# Patient Record
Sex: FEMALE | Race: WHITE | Employment: OTHER | URBAN - METROPOLITAN AREA
[De-identification: names, ages, dates, MRNs, and addresses within clinical notes are randomized per-mention and may not be internally consistent; named-entity substitution may affect disease eponyms.]

---

## 2018-08-06 ENCOUNTER — HOSPITAL ENCOUNTER (OUTPATIENT)
Age: 63
Discharge: HOME | End: 2018-08-08
Payer: COMMERCIAL

## 2018-08-06 ENCOUNTER — HOSPITAL ENCOUNTER (OUTPATIENT)
Dept: INTERVENTIONAL RADIOLOGY/VASCULAR | Age: 63
Discharge: HOME | End: 2018-08-08
Payer: COMMERCIAL

## 2018-08-06 ENCOUNTER — HOSPITAL ENCOUNTER (OUTPATIENT)
Dept: GENERAL RADIOLOGY | Age: 63
Discharge: HOME | End: 2018-08-08
Payer: COMMERCIAL

## 2018-08-06 DIAGNOSIS — R79.89 D-DIMER, ELEVATED: ICD-10-CM

## 2018-08-06 DIAGNOSIS — R06.02 SOB (SHORTNESS OF BREATH): ICD-10-CM

## 2018-08-06 PROCEDURE — 93970 EXTREMITY STUDY: CPT

## 2018-08-06 PROCEDURE — 71046 X-RAY EXAM CHEST 2 VIEWS: CPT

## 2018-09-07 ENCOUNTER — HOSPITAL ENCOUNTER (OUTPATIENT)
Dept: GENERAL RADIOLOGY | Age: 63
Discharge: HOME | End: 2018-09-09
Payer: COMMERCIAL

## 2018-09-07 ENCOUNTER — HOSPITAL ENCOUNTER (OUTPATIENT)
Age: 63
Discharge: HOME | End: 2018-09-09
Payer: COMMERCIAL

## 2018-09-07 DIAGNOSIS — R52 PAIN: ICD-10-CM

## 2018-09-07 PROCEDURE — 73630 X-RAY EXAM OF FOOT: CPT

## 2019-08-01 ENCOUNTER — OFFICE VISIT (OUTPATIENT)
Dept: PHYSICAL MEDICINE AND REHAB | Age: 64
End: 2019-08-01
Payer: COMMERCIAL

## 2019-08-01 VITALS
DIASTOLIC BLOOD PRESSURE: 64 MMHG | BODY MASS INDEX: 25.83 KG/M2 | SYSTOLIC BLOOD PRESSURE: 118 MMHG | HEART RATE: 75 BPM | HEIGHT: 65 IN | WEIGHT: 155 LBS

## 2019-08-01 DIAGNOSIS — R53.1 WEAKNESS: ICD-10-CM

## 2019-08-01 DIAGNOSIS — G56.03 BILATERAL CARPAL TUNNEL SYNDROME: Primary | ICD-10-CM

## 2019-08-01 DIAGNOSIS — R20.2 PARESTHESIA: ICD-10-CM

## 2019-08-01 PROCEDURE — 1036F TOBACCO NON-USER: CPT | Performed by: PHYSICAL MEDICINE & REHABILITATION

## 2019-08-01 PROCEDURE — G8427 DOCREV CUR MEDS BY ELIG CLIN: HCPCS | Performed by: PHYSICAL MEDICINE & REHABILITATION

## 2019-08-01 PROCEDURE — 3017F COLORECTAL CA SCREEN DOC REV: CPT | Performed by: PHYSICAL MEDICINE & REHABILITATION

## 2019-08-01 PROCEDURE — 99203 OFFICE O/P NEW LOW 30 MIN: CPT | Performed by: PHYSICAL MEDICINE & REHABILITATION

## 2019-08-01 PROCEDURE — G8419 CALC BMI OUT NRM PARAM NOF/U: HCPCS | Performed by: PHYSICAL MEDICINE & REHABILITATION

## 2019-08-01 RX ORDER — FLUTICASONE PROPIONATE 50 MCG
SPRAY, SUSPENSION (ML) NASAL PRN
COMMUNITY
Start: 2019-07-10 | End: 2021-11-29 | Stop reason: SDUPTHER

## 2019-08-01 RX ORDER — LOSARTAN POTASSIUM 25 MG/1
25 TABLET ORAL DAILY
COMMUNITY
Start: 2019-06-19 | End: 2021-08-03 | Stop reason: SDUPTHER

## 2019-08-01 NOTE — PROGRESS NOTES
Jovany Borjas D.O., P.T. [facility]  [address] [facility], [address] [place] [address]  Phone: [phone]  Fax: [phone]    PCP: Syl Cardoso MD  Date of visit: 8/1/19    Chief Complaint   Patient presents with    Hand Pain     New Patient, bilateral hand pain and weakness    Numbness       Dear Dr. Mabel Gunn,    As you know,  Amber Pride is a 61 y.o.  right hand dominant woman with sudden onset of right thirddigit numbness and weakness of the left  after using wire cutters 5 weeks ago. Now, the pain is constant and occurs daily. The pain is rated Pain Score:   0 - No pain, is described as numb, and is located in the right third digit tip without radiation. The symptoms have been unchanged since onset. The pain is better with nothing. The pain is worse with gripping, spray deoderant. The prior workup has included: none. The prior treatment has included:      Past Medical History:   Diagnosis Date    Diabetes mellitus ([address]     type 2    Hyperlipidemia        Past Surgical History:   Procedure Laterality Date    APPENDECTOMY  07/07/2016    laparoscopic    BREAST SURGERY      COLONOSCOPY      HYSTERECTOMY       Social History     Tobacco Use    Smoking status: Never Smoker    Smokeless tobacco: Never Used   Substance Use Topics    Alcohol use: No    Drug use: Not on file     Unemployed.      Family History   Problem Relation Age of Onset    Asthma Mother     High Blood Pressure Mother     Other Mother     Depression Mother     Heart Disease Father     High Cholesterol Father     High Blood Pressure Father     High Cholesterol Brother        Allergies   Allergen Reactions    Penicillins Rash       Current Outpatient Medications   Medication Sig Dispense Refill    metFORMIN (GLUCOPHAGE) 1000 MG tablet Take 1,000 mg by mouth 2 times daily      losartan (COZAAR) 25 MG tablet Take 25 mg by mouth daily     Beth Carmona

## 2019-08-15 ENCOUNTER — OFFICE VISIT (OUTPATIENT)
Dept: PHYSICAL MEDICINE AND REHAB | Age: 64
End: 2019-08-15
Payer: COMMERCIAL

## 2019-08-15 VITALS — HEIGHT: 65 IN | WEIGHT: 155 LBS | BODY MASS INDEX: 25.83 KG/M2

## 2019-08-15 DIAGNOSIS — G56.03 BILATERAL CARPAL TUNNEL SYNDROME: Primary | ICD-10-CM

## 2019-08-15 PROCEDURE — 95910 NRV CNDJ TEST 7-8 STUDIES: CPT | Performed by: PHYSICAL MEDICINE & REHABILITATION

## 2019-08-15 PROCEDURE — 95886 MUSC TEST DONE W/N TEST COMP: CPT | Performed by: PHYSICAL MEDICINE & REHABILITATION

## 2019-08-15 NOTE — PATIENT INSTRUCTIONS
Electrodiagnotic Laboratory  Accredited by the [facility] with Exemplary status  MORENITA Russo D.O. [facility]  [address] [facility] [place]  Phone: [phone]  Fax: [phone]        Today you had an electrodiagnostic exam which included nerve conduction studies (NCS) and electromyography (EMG). This test evaluated the electrical activity of your nerves and muscles to help determine if you have a nerve or muscle disease. This test can help determine the location and type of a nerve or muscle problem. This will help your referring doctor diagnose your condition and determine the appropriate next step in your treatment plan. After your test:    1. There are no long lasting side effects of the test.     2. You may resume your normal activities without restrictions. 3.  Resume any medications that were stopped for the test.     4  If you have sore areas or bruising in your muscles where the needle was placed, apply a cold pack to the sore area for 15-20 minutes three to four times a day as needed for pain. The soreness should go away in about 1-2 days. 5. Your results were provided  Briefly at the end of your test and the final detailed report will be provided to your referring physician, and/or primary care physician and any other parties you requested within 1-2 days of the examination. You may wish to contact your referring provider after a few days to determine what they would like you to do next. 6.  Please call [phone] with any questions or concerns and if you develop increased body temperature/fever, swelling, tenderness, increased pain and/or drainage from the sites where the needle was placed. Thank you for choosing us for your health care needs.

## 2019-08-15 NOTE — PROGRESS NOTES
[address] [facility]  Electrodiagnostic Laboratory  *Accredited by the [facility] with exemplary status  [address] [facility]  Phone: [phone]  Fax: [phone]    Referring Provider: Huseyin Lemus DO  Primary Care Physician: Cris Franco MD  Patient Name: Claudia Cifuentes  Patient YOB: 1955  Gender: female  BMI: Body mass index is 25.79 kg/m². Height 5' 5\" (1.651 m), weight 155 lb (70.3 kg). 8/15/2019    Description of clinical problem:   Chief Complaint   Patient presents with    Hand Numbness     Right hand    Extremity Weakness     Left hand      Pain No  Pain Score:   0 - No pain; Numbness/tingling  Yes; Weakness  Yes       Sensory NCS      Nerve / Sites Rec. Site Peak Lat PP Amp Segments Distance Velocity Temp. ms µV  cm m/s °C   R Median - Digit II (Antidromic)      Palm Dig II 1.88 29.3 Palm - Dig II 7 56 33.4      Wrist Dig II 4.48* 23.4 Wrist - Dig II 14 37 33.4   L Median - Digit II (Antidromic)      Palm Dig II 1.98 24.9 Palm - Dig II 7 54 33.5      Wrist Dig II 4.53* 23.8 Wrist - Dig II 14 36 33.5   R Ulnar - Digit V (Antidromic)      Wrist Dig V 3.33 31.3 Wrist - Dig V 14 54 33.5   R Radial - Anatomical snuff box (Forearm)      Forearm Wrist 2.40 27.8 Forearm - Wrist 10 62 33.2       Motor NCS      Nerve / Sites Muscle Onset Amplitude Segments Distance Velocity Temp.     ms mV  cm m/s °C   R Median - APB      Palm APB 2.03 7.6 Palm - APB   33.5      Wrist APB 5.21* 6.9 Wrist - Palm 8 25* 33.5      Elbow APB 9.58 6.3 Elbow - Wrist 22 50 33.5   L Median - APB      Palm APB 1.67 6.0 Palm - APB   33.6      Wrist APB 5.00* 4.2 Wrist - Palm 8 24* 33.6      Elbow APB 8.96 4.0 Elbow - Wrist 20 51 33.6   R Ulnar - ADM      Wrist ADM 2.92 8.7 Wrist - ADM 8  34.5      B. Elbow ADM 6.41 8.0 B. Elbow - Wrist 19 54 34.5      A. Elbow ADM 8.18 7.6 A. Elbow - B. Elbow 10 56 34.5       F Wave      Nerve Fmin % F    ms %   R Median - APB 31.51 20 R Ulnar - ADM 26.04 80   L Median - APB 30.00 20       EMG      EMG Summary Table     Spontaneous MUAP Recruitment   Muscle Nerve Roots IA Fib PSW Fasc Amp Dur. PPP Pattern   L. Biceps brachii Musculocutaneous C5-C6 N None None None N N N N   L. Triceps brachii Radial C6-C8 N None None None N N N N   L. Pronator teres Median C6-C7 N None None None N N N N   L. First dorsal interosseous Ulnar C8-T1 N None None None N N N N   L. Abductor pollicis brevis Median Q6-L3 N 3+ 3+ None N N N Reduced   R. Biceps brachii Musculocutaneous C5-C6 N None None None N N N N   R. Triceps brachii Radial C6-C8 N None None None N N N N   R. Pronator teres Median C6-C7 N None None None N N N N   R. First dorsal interosseous Ulnar C8-T1 N None None None N N N N   R. Abductor pollicis brevis Median C8-F6 N None None None N N N N       Study Limitations:  nonbe    Summary of Findings:   Nerve conduction studies:   · The following nerve conduction studies were abnormal:   · The bilateral median sensory latency at the wrist is prolonged. · The bilateral median motor latency at the wrist is prolonged and there is focal slowing across the wrist in conduction velocity. · All other nerve conduction studies listed in the table above were normal in latency, amplitude and conduction velocity. Needle EMG:   · Needle EMG was performed using a concentric needle. · The following abnormalities were seen on needle EMG: positive sharp waves and fibrillation potentials with decreased motor unit recruitment in the left abductor pollicis brevis. All other muscles tested, as listed in the table above demonstrated normal amplitude, duration, phases and recruitment and no active denervation signs were seen. Diagnostic Interpretation: This study was abnormal.     Electrodiagnosis: There is electrodiagnostic evidence of a median mononeuropathy.    · Location: bilateral at the wrist.   · Nature: [  ] Axonal   [ X ] Demyelinating on the right  [ X

## 2021-02-15 PROBLEM — E11.9 TYPE 2 DIABETES MELLITUS WITHOUT COMPLICATION (HCC): Status: ACTIVE | Noted: 2021-02-15

## 2021-02-15 RX ORDER — FLUOXETINE 10 MG/1
CAPSULE ORAL
COMMUNITY
End: 2021-03-02 | Stop reason: ALTCHOICE

## 2021-02-15 RX ORDER — GLIMEPIRIDE 4 MG/1
TABLET ORAL
COMMUNITY
End: 2021-03-02

## 2021-02-15 RX ORDER — ALBUTEROL SULFATE 90 UG/1
AEROSOL, METERED RESPIRATORY (INHALATION)
COMMUNITY
End: 2021-09-01

## 2021-02-15 RX ORDER — ROSUVASTATIN CALCIUM 10 MG/1
TABLET, COATED ORAL
COMMUNITY
End: 2021-03-02 | Stop reason: SDUPTHER

## 2021-02-15 RX ORDER — CYCLOBENZAPRINE HCL 10 MG
TABLET ORAL
COMMUNITY
End: 2021-03-02 | Stop reason: ALTCHOICE

## 2021-02-15 RX ORDER — PIOGLITAZONEHYDROCHLORIDE 30 MG/1
TABLET ORAL
COMMUNITY
End: 2021-03-02 | Stop reason: ALTCHOICE

## 2021-02-15 RX ORDER — GABAPENTIN 600 MG/1
TABLET ORAL
COMMUNITY
End: 2021-06-02 | Stop reason: SDUPTHER

## 2021-02-15 RX ORDER — INFLUENZA A VIRUS A/SINGAPORE/GP1908/2015 IVR-180 (H1N1) ANTIGEN (MDCK CELL DERIVED, PROPIOLACTONE INACTIVATED), INFLUENZA A VIRUS A/NORTH CAROLINA/04/2016 (H3N2) HEMAGGLUTININ ANTIGEN (MDCK CELL DERIVED, PROPIOLACTONE INACTIVATED), INFLUENZA B VIRUS B/IOWA/06/2017 HEMAGGLUTININ ANTIGEN (MDCK CELL DERIVED, PROPIOLACTONE INACTIVATED), INFLUENZA B VIRUS B/SINGAPORE/INFTT-16-0610/2016 HEMAGGLUTININ ANTIGEN (MDCK CELL DERIVED, PROPIOLACTONE INACTIVATED) 15; 15; 15; 15 UG/.5ML; UG/.5ML; UG/.5ML; UG/.5ML
INJECTION, SUSPENSION INTRAMUSCULAR
COMMUNITY
End: 2021-03-02 | Stop reason: ALTCHOICE

## 2021-02-15 RX ORDER — PIOGLITAZONEHYDROCHLORIDE 45 MG/1
TABLET ORAL
COMMUNITY
End: 2021-03-02 | Stop reason: ALTCHOICE

## 2021-03-02 ENCOUNTER — OFFICE VISIT (OUTPATIENT)
Dept: FAMILY MEDICINE CLINIC | Age: 66
End: 2021-03-02
Payer: MEDICARE

## 2021-03-02 VITALS
HEART RATE: 91 BPM | WEIGHT: 163 LBS | HEIGHT: 65 IN | DIASTOLIC BLOOD PRESSURE: 78 MMHG | BODY MASS INDEX: 27.16 KG/M2 | SYSTOLIC BLOOD PRESSURE: 126 MMHG | OXYGEN SATURATION: 98 % | TEMPERATURE: 97 F

## 2021-03-02 DIAGNOSIS — I10 HYPERTENSION, UNSPECIFIED TYPE: ICD-10-CM

## 2021-03-02 DIAGNOSIS — Z13.6 SCREENING FOR CARDIOVASCULAR CONDITION: ICD-10-CM

## 2021-03-02 DIAGNOSIS — K21.9 GASTROESOPHAGEAL REFLUX DISEASE WITHOUT ESOPHAGITIS: ICD-10-CM

## 2021-03-02 DIAGNOSIS — E55.9 VITAMIN D DEFICIENCY: ICD-10-CM

## 2021-03-02 DIAGNOSIS — Z79.899 ON STATIN THERAPY: ICD-10-CM

## 2021-03-02 DIAGNOSIS — Z12.31 ENCOUNTER FOR SCREENING MAMMOGRAM FOR MALIGNANT NEOPLASM OF BREAST: ICD-10-CM

## 2021-03-02 DIAGNOSIS — Z09 ENCOUNTER FOR FOLLOW-UP EXAMINATION AFTER COMPLETED TREATMENT FOR CONDITIONS OTHER THAN MALIGNANT NEOPLASM: ICD-10-CM

## 2021-03-02 DIAGNOSIS — Z13.820 SCREENING FOR OSTEOPOROSIS: ICD-10-CM

## 2021-03-02 DIAGNOSIS — Z13.220 SCREENING FOR LIPID DISORDERS: ICD-10-CM

## 2021-03-02 DIAGNOSIS — E78.00 HYPERCHOLESTEROLEMIA: ICD-10-CM

## 2021-03-02 DIAGNOSIS — E11.9 TYPE 2 DIABETES MELLITUS WITHOUT COMPLICATION, WITHOUT LONG-TERM CURRENT USE OF INSULIN (HCC): ICD-10-CM

## 2021-03-02 DIAGNOSIS — Z76.89 ENCOUNTER TO ESTABLISH CARE WITH NEW DOCTOR: Primary | ICD-10-CM

## 2021-03-02 PROCEDURE — G8399 PT W/DXA RESULTS DOCUMENT: HCPCS | Performed by: INTERNAL MEDICINE

## 2021-03-02 PROCEDURE — 1036F TOBACCO NON-USER: CPT | Performed by: INTERNAL MEDICINE

## 2021-03-02 PROCEDURE — G8427 DOCREV CUR MEDS BY ELIG CLIN: HCPCS | Performed by: INTERNAL MEDICINE

## 2021-03-02 PROCEDURE — 3017F COLORECTAL CA SCREEN DOC REV: CPT | Performed by: INTERNAL MEDICINE

## 2021-03-02 PROCEDURE — 1123F ACP DISCUSS/DSCN MKR DOCD: CPT | Performed by: INTERNAL MEDICINE

## 2021-03-02 PROCEDURE — 4040F PNEUMOC VAC/ADMIN/RCVD: CPT | Performed by: INTERNAL MEDICINE

## 2021-03-02 PROCEDURE — 2022F DILAT RTA XM EVC RTNOPTHY: CPT | Performed by: INTERNAL MEDICINE

## 2021-03-02 PROCEDURE — 3046F HEMOGLOBIN A1C LEVEL >9.0%: CPT | Performed by: INTERNAL MEDICINE

## 2021-03-02 PROCEDURE — 1090F PRES/ABSN URINE INCON ASSESS: CPT | Performed by: INTERNAL MEDICINE

## 2021-03-02 PROCEDURE — G8417 CALC BMI ABV UP PARAM F/U: HCPCS | Performed by: INTERNAL MEDICINE

## 2021-03-02 PROCEDURE — 99204 OFFICE O/P NEW MOD 45 MIN: CPT | Performed by: INTERNAL MEDICINE

## 2021-03-02 PROCEDURE — G8484 FLU IMMUNIZE NO ADMIN: HCPCS | Performed by: INTERNAL MEDICINE

## 2021-03-02 RX ORDER — ACETAMINOPHEN 500 MG
500 TABLET ORAL EVERY 6 HOURS PRN
Status: ON HOLD | COMMUNITY
End: 2022-02-22 | Stop reason: HOSPADM

## 2021-03-02 RX ORDER — DULAGLUTIDE 0.75 MG/.5ML
INJECTION, SOLUTION SUBCUTANEOUS
COMMUNITY
Start: 2021-02-16 | End: 2021-03-22 | Stop reason: SDUPTHER

## 2021-03-02 RX ORDER — ACYCLOVIR 400 MG/1
400 TABLET ORAL
COMMUNITY
End: 2021-09-01

## 2021-03-02 RX ORDER — ROSUVASTATIN CALCIUM 10 MG/1
TABLET, COATED ORAL
Qty: 90 TABLET | Refills: 1 | Status: SHIPPED
Start: 2021-03-02 | End: 2021-05-04 | Stop reason: SDUPTHER

## 2021-03-02 RX ORDER — ASPIRIN 81 MG/1
81 TABLET, CHEWABLE ORAL DAILY
COMMUNITY

## 2021-03-02 ASSESSMENT — PATIENT HEALTH QUESTIONNAIRE - PHQ9
SUM OF ALL RESPONSES TO PHQ9 QUESTIONS 1 & 2: 0
SUM OF ALL RESPONSES TO PHQ QUESTIONS 1-9: 0
1. LITTLE INTEREST OR PLEASURE IN DOING THINGS: 0
2. FEELING DOWN, DEPRESSED OR HOPELESS: 0

## 2021-03-02 NOTE — PROGRESS NOTES
[address] [facility] [id number]     Date of Visit:  3/2/2021  Patient Name: Amber Rodriguez   Patient :  1955     CHIEF COMPLAINT:     Amber Rodriguez is a 72 y.o. female who presents today for an general visit to be evaluated for the following condition(s):  Chief Complaint   Patient presents with   Martinez Establish Care       REVIEW OF SYSTEM      Review of Systems   Constitutional: Negative. HENT: Negative. Eyes: Negative. Respiratory: Negative. Cardiovascular: Negative. Gastrointestinal: Negative. Endocrine: Negative. Genitourinary: Negative. Musculoskeletal: Negative. Skin: Negative. Allergic/Immunologic: Negative. Neurological: Negative. Hematological: Negative. Psychiatric/Behavioral: Negative. All other systems reviewed and are negative. HISTORY OF PRESENT ILLNESS     HPI   Patient is here to establish care with Dr. De Leon Son. Patient is here to discuss blood sugar and medication. Last A1C was 7.6 a few months ago. Fasting blood sugar this AM was 207. Overall she has been doing well. She and her family recently moved to hospitals from [employer] [place], [place]. No other complaints at this time.       Health care Maintenance:  Last diabetic labs 2021  Last eye exam - patient to check records  Last physical unsure  Mamm: unknown last one  Pap smear: total hysterectomy  Last Colonoscopy:  Dr Christian Moe  Flu Vaccine: 2020    REVIEWED INFORMATION      Allergies   Allergen Reactions    Seasonal     Penicillins Rash       Patient Active Problem List   Diagnosis    Generalized abdominal pain    Type 2 diabetes mellitus without complication ([address]       Past Medical History:   Diagnosis Date    Diabetes mellitus ([address]     type 2    Hyperlipidemia        Past Surgical History:   Procedure Laterality Date    APPENDECTOMY  2016    laparoscopic    BREAST SURGERY      COLONOSCOPY      HYSTERECTOMY          Social History     Socioeconomic History    Marital status:      Spouse name: None    Number of children: None    Years of education: None    Highest education level: None   Occupational History    None   Social Needs    Financial resource strain: None    Food insecurity     Worry: None     Inability: None    Transportation needs     Medical: None     Non-medical: None   Tobacco Use    Smoking status: Never Smoker    Smokeless tobacco: Never Used   Substance and Sexual Activity    Alcohol use: No    Drug use: None    Sexual activity: None   Lifestyle    Physical activity     Days per week: None     Minutes per session: None    Stress: None   Relationships    Social connections     Talks on phone: None     Gets together: None     Attends Scientologist service: None     Active member of club or organization: None     Attends meetings of clubs or organizations: None     Relationship status: None    Intimate partner violence     Fear of current or ex partner: None     Emotionally abused: None     Physically abused: None     Forced sexual activity: None   Other Topics Concern    None   Social History Narrative    None      Family History   Problem Relation Age of Onset    Asthma Mother     High Blood Pressure Mother     Other Mother     Depression Mother     Heart Disease Father     High Cholesterol Father     High Blood Pressure Father     High Cholesterol Brother        PHYSICAL EXAM     /78   Pulse 91   Temp 97 °F (36.1 °C)   Ht 5' 5\" (1.651 m)   Wt 163 lb (73.9 kg)   SpO2 98%   BMI 27.12 kg/m²    Physical Exam  Vitals signs and nursing note reviewed. Constitutional:       Appearance: Normal appearance. She is normal weight. HENT:      Head: Normocephalic and atraumatic.       Right Ear: Tympanic membrane, ear canal and external ear normal.      Left Ear: Tympanic membrane, ear canal and external ear normal.      Nose: Nose normal.      Mouth/Throat: after completed treatment for conditions other than malignant neoplasm   - DEXA BONE DENSITY AXIAL SKELETON; Future    11. Encounter to establish care with new doctor    Records reviewed -patient completed form to get additional records sent    Patient here to establish care as a new patient    Health care maintenance -  Immunizations are up to date  Last diabetic labs February 2021  Last eye exam - patient to check records  Last physical unsure  Mamm: unknown last one  Pap smear: total hysterectomy  Last Colonoscopy: 2/21 Dr Moraes Day  Flu Vaccine: 2/2020    Diabetes - patient to check records for last eye exam, cont current regimen, cont to monitor home blood sugars, last A1C 7.6. Will recheck labs in 6 to 8 weeks. Cont working to optimize diet and healthy activity    Cholesterol - cont med, cont working to optimize diet and healthy activity, recheck in 6 to 8 weeks    Vit d Def  - cont diet rich in Vit D, recheck with next set of labs    Reflux - cont med, avoid triggers    Anxiety/depression - cont med, sx well controlled currently    Allergies/asthma - has been stable, used meds for flare ups    Over 60 minutes spent with patient on direct patient care    Labs and f/u in 6 to 8 weeks    Return in about 2 months (around 5/2/2021) for F/u in May for follow up appt.     COMMUNICATION:       Electronically signed by Tian Mcdonald on 3/2/2021 at 9:33 AM

## 2021-03-25 ENCOUNTER — PATIENT MESSAGE (OUTPATIENT)
Dept: FAMILY MEDICINE CLINIC | Age: 66
End: 2021-03-25

## 2021-03-25 RX ORDER — FLUCONAZOLE 150 MG/1
150 TABLET ORAL DAILY
Qty: 2 TABLET | Refills: 0 | Status: SHIPPED | OUTPATIENT
Start: 2021-03-25 | End: 2021-05-04 | Stop reason: ALTCHOICE

## 2021-03-25 NOTE — TELEPHONE ENCOUNTER
This is a follow up to her earlier message on vaginal symptoms. This could be a yeast infection. I will send in diflucan 150 mg tablet. She can take one tablet once and if not resolved after a few days she can take a second diflucan tablet.     Diflucan sent to local [facility] listed in her pharmacies

## 2021-03-25 NOTE — TELEPHONE ENCOUNTER
Writer spoke to the patient and she is aware of her plan of care, and that medication has been sent to [facility].

## 2021-04-11 PROBLEM — Z12.31 ENCOUNTER FOR SCREENING MAMMOGRAM FOR MALIGNANT NEOPLASM OF BREAST: Status: ACTIVE | Noted: 2021-04-11

## 2021-04-11 PROBLEM — I10 HYPERTENSION: Status: ACTIVE | Noted: 2021-04-11

## 2021-04-11 PROBLEM — Z13.220 SCREENING FOR LIPID DISORDERS: Status: ACTIVE | Noted: 2021-04-11

## 2021-04-11 PROBLEM — E55.9 VITAMIN D DEFICIENCY: Status: ACTIVE | Noted: 2021-04-11

## 2021-04-11 PROBLEM — Z13.6 SCREENING FOR CARDIOVASCULAR CONDITION: Status: ACTIVE | Noted: 2021-04-11

## 2021-04-11 PROBLEM — Z13.820 SCREENING FOR OSTEOPOROSIS: Status: ACTIVE | Noted: 2021-04-11

## 2021-04-11 PROBLEM — K21.9 GASTROESOPHAGEAL REFLUX DISEASE WITHOUT ESOPHAGITIS: Status: ACTIVE | Noted: 2021-04-11

## 2021-04-11 ASSESSMENT — ENCOUNTER SYMPTOMS
GASTROINTESTINAL NEGATIVE: 1
EYES NEGATIVE: 1
RESPIRATORY NEGATIVE: 1
ALLERGIC/IMMUNOLOGIC NEGATIVE: 1

## 2021-04-19 ENCOUNTER — HOSPITAL ENCOUNTER (OUTPATIENT)
Dept: MAMMOGRAPHY | Age: 66
Discharge: HOME | End: 2021-04-21
Payer: MEDICARE

## 2021-04-19 DIAGNOSIS — Z12.31 ENCOUNTER FOR SCREENING MAMMOGRAM FOR MALIGNANT NEOPLASM OF BREAST: ICD-10-CM

## 2021-04-19 DIAGNOSIS — Z13.820 SCREENING FOR OSTEOPOROSIS: ICD-10-CM

## 2021-04-19 DIAGNOSIS — E55.9 VITAMIN D DEFICIENCY: ICD-10-CM

## 2021-04-19 DIAGNOSIS — Z09 ENCOUNTER FOR FOLLOW-UP EXAMINATION AFTER COMPLETED TREATMENT FOR CONDITIONS OTHER THAN MALIGNANT NEOPLASM: ICD-10-CM

## 2021-04-19 PROCEDURE — 77080 DXA BONE DENSITY AXIAL: CPT

## 2021-04-19 PROCEDURE — 77063 BREAST TOMOSYNTHESIS BI: CPT

## 2021-05-04 ENCOUNTER — HOSPITAL ENCOUNTER (OUTPATIENT)
Age: 66
Setting detail: SPECIMEN
Discharge: HOME | End: 2021-05-04
Payer: MEDICARE

## 2021-05-04 ENCOUNTER — OFFICE VISIT (OUTPATIENT)
Dept: FAMILY MEDICINE CLINIC | Age: 66
End: 2021-05-04
Payer: MEDICARE

## 2021-05-04 VITALS
HEART RATE: 88 BPM | DIASTOLIC BLOOD PRESSURE: 84 MMHG | WEIGHT: 149 LBS | SYSTOLIC BLOOD PRESSURE: 124 MMHG | BODY MASS INDEX: 24.83 KG/M2 | HEIGHT: 65 IN

## 2021-05-04 DIAGNOSIS — E11.9 TYPE 2 DIABETES MELLITUS WITHOUT COMPLICATION, WITHOUT LONG-TERM CURRENT USE OF INSULIN (HCC): ICD-10-CM

## 2021-05-04 DIAGNOSIS — M75.81 ROTATOR CUFF TENDONITIS, RIGHT: ICD-10-CM

## 2021-05-04 DIAGNOSIS — K21.9 GASTROESOPHAGEAL REFLUX DISEASE WITHOUT ESOPHAGITIS: ICD-10-CM

## 2021-05-04 DIAGNOSIS — Z13.6 SCREENING FOR CARDIOVASCULAR CONDITION: ICD-10-CM

## 2021-05-04 DIAGNOSIS — E55.9 VITAMIN D DEFICIENCY: ICD-10-CM

## 2021-05-04 DIAGNOSIS — R11.2 NAUSEA AND VOMITING, INTRACTABILITY OF VOMITING NOT SPECIFIED, UNSPECIFIED VOMITING TYPE: ICD-10-CM

## 2021-05-04 DIAGNOSIS — Z79.899 ON STATIN THERAPY: ICD-10-CM

## 2021-05-04 DIAGNOSIS — E11.9 TYPE 2 DIABETES MELLITUS WITHOUT COMPLICATION, WITHOUT LONG-TERM CURRENT USE OF INSULIN (HCC): Primary | ICD-10-CM

## 2021-05-04 DIAGNOSIS — E78.00 HYPERCHOLESTEROLEMIA: ICD-10-CM

## 2021-05-04 DIAGNOSIS — I10 HYPERTENSION, UNSPECIFIED TYPE: ICD-10-CM

## 2021-05-04 LAB
ABSOLUTE EOS #: 0.05 K/UL (ref 0–0.44)
ABSOLUTE IMMATURE GRANULOCYTE: <0.03 K/UL (ref 0–0.3)
ABSOLUTE LYMPH #: 1.5 K/UL (ref 1.1–3.7)
ABSOLUTE MONO #: 0.39 K/UL (ref 0.1–1.2)
ALBUMIN SERPL-MCNC: 4.7 G/DL (ref 3.5–5.2)
ALBUMIN/GLOBULIN RATIO: 1.7 (ref 1–2.5)
ALP BLD-CCNC: 85 U/L (ref 35–104)
ALT SERPL-CCNC: 38 U/L (ref 5–33)
ANION GAP SERPL CALCULATED.3IONS-SCNC: 16 MMOL/L (ref 9–17)
AST SERPL-CCNC: 35 U/L
BASOPHILS # BLD: 1 % (ref 0–2)
BASOPHILS ABSOLUTE: 0.05 K/UL (ref 0–0.2)
BILIRUB SERPL-MCNC: 0.52 MG/DL (ref 0.3–1.2)
BUN BLDV-MCNC: 12 MG/DL (ref 8–23)
BUN/CREAT BLD: ABNORMAL (ref 9–20)
CALCIUM SERPL-MCNC: 9.9 MG/DL (ref 8.6–10.4)
CHLORIDE BLD-SCNC: 102 MMOL/L (ref 98–107)
CHOLESTEROL, FASTING: 148 MG/DL
CHOLESTEROL/HDL RATIO: 3.2
CO2: 21 MMOL/L (ref 20–31)
CREAT SERPL-MCNC: 0.66 MG/DL (ref 0.5–0.9)
CREATININE URINE: 190.4 MG/DL (ref 28–217)
DIFFERENTIAL TYPE: NORMAL
EOSINOPHILS RELATIVE PERCENT: 1 % (ref 1–4)
ESTIMATED AVERAGE GLUCOSE: 235 MG/DL
GFR AFRICAN AMERICAN: >60 ML/MIN
GFR NON-AFRICAN AMERICAN: >60 ML/MIN
GFR SERPL CREATININE-BSD FRML MDRD: ABNORMAL ML/MIN/{1.73_M2}
GFR SERPL CREATININE-BSD FRML MDRD: ABNORMAL ML/MIN/{1.73_M2}
GLUCOSE FASTING: 193 MG/DL (ref 70–99)
HBA1C MFR BLD: 9.8 % (ref 4–6)
HCT VFR BLD CALC: 40.2 % (ref 36.3–47.1)
HDLC SERPL-MCNC: 46 MG/DL
HEMOGLOBIN: 13 G/DL (ref 11.9–15.1)
IMMATURE GRANULOCYTES: 0 %
LDL CHOLESTEROL: 47 MG/DL (ref 0–130)
LYMPHOCYTES # BLD: 26 % (ref 24–43)
MCH RBC QN AUTO: 29.5 PG (ref 25.2–33.5)
MCHC RBC AUTO-ENTMCNC: 32.3 G/DL (ref 28.4–34.8)
MCV RBC AUTO: 91.4 FL (ref 82.6–102.9)
MICROALBUMIN/CREAT 24H UR: 413 MG/L
MICROALBUMIN/CREAT UR-RTO: 217 MCG/MG CREAT
MONOCYTES # BLD: 7 % (ref 3–12)
NRBC AUTOMATED: 0 PER 100 WBC
PDW BLD-RTO: 12.8 % (ref 11.8–14.4)
PLATELET # BLD: 278 K/UL (ref 138–453)
PLATELET ESTIMATE: NORMAL
PMV BLD AUTO: 11.2 FL (ref 8.1–13.5)
POTASSIUM SERPL-SCNC: 4.6 MMOL/L (ref 3.7–5.3)
RBC # BLD: 4.4 M/UL (ref 3.95–5.11)
RBC # BLD: NORMAL 10*6/UL
SEG NEUTROPHILS: 65 % (ref 36–65)
SEGMENTED NEUTROPHILS ABSOLUTE COUNT: 3.85 K/UL (ref 1.5–8.1)
SODIUM BLD-SCNC: 139 MMOL/L (ref 135–144)
TOTAL PROTEIN: 7.5 G/DL (ref 6.4–8.3)
TRIGLYCERIDE, FASTING: 276 MG/DL
TSH SERPL DL<=0.05 MIU/L-ACNC: 0.67 MIU/L (ref 0.3–5)
VITAMIN D 25-HYDROXY: 43.6 NG/ML (ref 30–100)
VLDLC SERPL CALC-MCNC: ABNORMAL MG/DL (ref 1–30)
WBC # BLD: 5.9 K/UL (ref 3.5–11.3)
WBC # BLD: NORMAL 10*3/UL

## 2021-05-04 PROCEDURE — 1090F PRES/ABSN URINE INCON ASSESS: CPT | Performed by: INTERNAL MEDICINE

## 2021-05-04 PROCEDURE — 1036F TOBACCO NON-USER: CPT | Performed by: INTERNAL MEDICINE

## 2021-05-04 PROCEDURE — 4040F PNEUMOC VAC/ADMIN/RCVD: CPT | Performed by: INTERNAL MEDICINE

## 2021-05-04 PROCEDURE — 3017F COLORECTAL CA SCREEN DOC REV: CPT | Performed by: INTERNAL MEDICINE

## 2021-05-04 PROCEDURE — G8427 DOCREV CUR MEDS BY ELIG CLIN: HCPCS | Performed by: INTERNAL MEDICINE

## 2021-05-04 PROCEDURE — 1123F ACP DISCUSS/DSCN MKR DOCD: CPT | Performed by: INTERNAL MEDICINE

## 2021-05-04 PROCEDURE — 2022F DILAT RTA XM EVC RTNOPTHY: CPT | Performed by: INTERNAL MEDICINE

## 2021-05-04 PROCEDURE — 3046F HEMOGLOBIN A1C LEVEL >9.0%: CPT | Performed by: INTERNAL MEDICINE

## 2021-05-04 PROCEDURE — G8399 PT W/DXA RESULTS DOCUMENT: HCPCS | Performed by: INTERNAL MEDICINE

## 2021-05-04 PROCEDURE — G8420 CALC BMI NORM PARAMETERS: HCPCS | Performed by: INTERNAL MEDICINE

## 2021-05-04 PROCEDURE — 99214 OFFICE O/P EST MOD 30 MIN: CPT | Performed by: INTERNAL MEDICINE

## 2021-05-04 RX ORDER — ONDANSETRON 4 MG/1
4 TABLET, ORALLY DISINTEGRATING ORAL 2 TIMES DAILY PRN
Qty: 20 TABLET | Refills: 1 | Status: SHIPPED | OUTPATIENT
Start: 2021-05-04 | End: 2021-05-19 | Stop reason: SDUPTHER

## 2021-05-04 RX ORDER — MIRTAZAPINE 30 MG/1
30 TABLET, FILM COATED ORAL NIGHTLY
Qty: 90 TABLET | Refills: 1 | Status: SHIPPED
Start: 2021-05-04 | End: 2021-08-03 | Stop reason: SDUPTHER

## 2021-05-04 RX ORDER — ROSUVASTATIN CALCIUM 10 MG/1
TABLET, COATED ORAL
Qty: 90 TABLET | Refills: 1 | Status: SHIPPED | OUTPATIENT
Start: 2021-05-04 | End: 2021-05-19 | Stop reason: SDUPTHER

## 2021-05-04 RX ORDER — OMEPRAZOLE 40 MG/1
40 CAPSULE, DELAYED RELEASE ORAL
Qty: 90 CAPSULE | Refills: 1 | Status: SHIPPED
Start: 2021-05-04 | End: 2021-08-03 | Stop reason: SDUPTHER

## 2021-05-04 SDOH — ECONOMIC STABILITY: TRANSPORTATION INSECURITY
IN THE PAST 12 MONTHS, HAS THE LACK OF TRANSPORTATION KEPT YOU FROM MEDICAL APPOINTMENTS OR FROM GETTING MEDICATIONS?: NO

## 2021-05-04 ASSESSMENT — ENCOUNTER SYMPTOMS
GASTROINTESTINAL NEGATIVE: 1
RESPIRATORY NEGATIVE: 1
EYES NEGATIVE: 1
ALLERGIC/IMMUNOLOGIC NEGATIVE: 1

## 2021-05-04 NOTE — PROGRESS NOTES
[address] [place] [id number]     Date of Visit:  2021  Patient Name: Darryl Valencia   Patient :  1955     CHIEF COMPLAINT:     Darryl Valencia is a 72 y.o. female who presents today for an general visit to be evaluated for the following condition(s):  Chief Complaint   Patient presents with    Nausea     Patient states being extremely sick with her Trulicity. Nausea- a few boughts of vomiting. Also states having loose bowels a few days following the injection.  Diabetes     Patient states she is around 140-150 in the morning.  Joint Pain     Patient states her R shoulder pain is back. Notices pain when she puts pressure/weight on it. Also is having some pain in her right knee,    Medication Refill     Patient requesting refills on omeprazole, mirtazipine and rosuvastatin- 90 day scripts. REVIEW OF SYSTEM      Review of Systems   Constitutional: Negative. HENT: Negative. Eyes: Negative. Respiratory: Negative. Cardiovascular: Negative. Gastrointestinal: Negative. Endocrine: Negative. Genitourinary: Negative. Musculoskeletal: Positive for arthralgias. Right shoulder pain   Skin: Negative. Allergic/Immunologic: Negative. Neurological: Negative. Hematological: Negative. Psychiatric/Behavioral: Negative. All other systems reviewed and are negative. HISTORY OF PRESENT ILLNESS     HPI  Patient here for follow up appt and medication review. Patient has not been able to get labs drawn yet. Mammogram and dexascan complete. Patient also with continued nausea/vomiting - started many months ago, has been worse with the trulicity. Patient also with chronic right shoulder pain/arthrtiis - has had injections in the past that helped - her last right shoulder injection was on 2021. Patient has also had knee injections for OA in the past that has helped as well.     Health meetings of clubs or organizations: None     Relationship status: None    Intimate partner violence     Fear of current or ex partner: None     Emotionally abused: None     Physically abused: None     Forced sexual activity: None   Other Topics Concern    None   Social History Narrative    None      Family History   Problem Relation Age of Onset    Asthma Mother     High Blood Pressure Mother     Other Mother     Depression Mother     Heart Disease Father     High Cholesterol Father     High Blood Pressure Father     High Cholesterol Brother        PHYSICAL EXAM     /84   Pulse 88   Ht 5' 5\" (1.651 m)   Wt 149 lb (67.6 kg)   BMI 24.79 kg/m²    Physical Exam  Vitals signs and nursing note reviewed. Constitutional:       Appearance: Normal appearance. She is normal weight. HENT:      Head: Normocephalic and atraumatic. Right Ear: Tympanic membrane, ear canal and external ear normal.      Left Ear: Tympanic membrane, ear canal and external ear normal.      Nose: Nose normal.      Mouth/Throat:      Mouth: Mucous membranes are moist.   Eyes:      Extraocular Movements: Extraocular movements intact. Conjunctiva/sclera: Conjunctivae normal.      Pupils: Pupils are equal, round, and reactive to light. Neck:      Musculoskeletal: Normal range of motion and neck supple. Cardiovascular:      Rate and Rhythm: Normal rate and regular rhythm. Pulses: Normal pulses. Heart sounds: Normal heart sounds. Pulmonary:      Effort: Pulmonary effort is normal.      Breath sounds: Normal breath sounds. Abdominal:      General: Abdomen is flat. Bowel sounds are normal.      Palpations: Abdomen is soft. Musculoskeletal:      Comments: Decreased rom right shoulder  Positive impingement   Skin:     General: Skin is warm. Capillary Refill: Capillary refill takes less than 2 seconds. Neurological:      General: No focal deficit present.       Mental Status: She is alert and oriented to person, place, and time. Psychiatric:         Mood and Affect: Mood normal.         Behavior: Behavior normal.         Thought Content: Thought content normal.         Judgment: Judgment normal.         ASSESSMENT/PLAN     1. Type 2 diabetes mellitus without complication, without long-term current use of insulin (HCC)  - Microalbumin / Creatinine Urine Ratio; Future  - TSH With Reflex Ft4; Future  - Joe Martinez MD, Endocrinology, [place]    2. Nausea and vomiting, intractability of vomiting not specified, unspecified vomiting type    3. Gastroesophageal reflux disease without esophagitis    4. Vitamin D deficiency    5. Hypertension, unspecified type    6. On statin therapy    7. Hypercholesterolemia    Records reviewed     Patient here for follow up appt     Health care maintenance -  Immunizations are up to date  Last diabetic labs February 2021  Last eye exam - patient to check records  Last physical unsure  Mamm: unknown last one  Pap smear: total hysterectomy  Last Colonoscopy: 2/21 Dr Fredy Holden  Flu Vaccine: 2/2020  COVID Vaccine completed     Diabetes - patient to check records for last eye exam, cont current regimen, cont to monitor home blood sugars, last A1C 7.6. Recheck now and in 5 months.    Cont working to optimize diet and healthy activity    Nausea and vomiting - discussed this could be gastroparesis, also discussed seeing GI locally - patient would like to monitor and check back in with me in a few weeks    Right shoulder RTC tendonitis and knee OA - patient has benefited from injections in the past, patient to schedule an appt for injections with me in 2 to 3 weeks     Cholesterol - cont med, cont working to optimize diet and healthy activity, recheck now and in 5 months     Vit d Def  - cont diet rich in Vit D, recheck now and in 5 months     Reflux - cont med, avoid triggers     Anxiety/depression - cont med, sx well controlled currently     Allergies/asthma - has been stable, used meds for flare ups     Over 60 minutes spent with patient on direct patient care    Labs now    F/u in 2 to 3 weeks for shoulder and knee injections     Labs and annual physical in 4 to 5 months      Return for annual physical in 5 months - please give 30 minute annual physical slot.     COMMUNICATION:       Electronically signed by Joseph Diamond MD on 5/4/2021 at 8:39 AM

## 2021-05-06 ENCOUNTER — TELEPHONE (OUTPATIENT)
Dept: FAMILY MEDICINE CLINIC | Age: 66
End: 2021-05-06

## 2021-05-07 ENCOUNTER — TELEPHONE (OUTPATIENT)
Dept: FAMILY MEDICINE CLINIC | Age: 66
End: 2021-05-07

## 2021-05-07 NOTE — TELEPHONE ENCOUNTER
I returned her call for a second time yesterday but it went straight to voicemail. I will try to call her again today the first break I get.

## 2021-05-11 PROBLEM — Z12.31 ENCOUNTER FOR SCREENING MAMMOGRAM FOR MALIGNANT NEOPLASM OF BREAST: Status: RESOLVED | Noted: 2021-04-11 | Resolved: 2021-05-11

## 2021-05-11 PROBLEM — Z13.6 SCREENING FOR CARDIOVASCULAR CONDITION: Status: RESOLVED | Noted: 2021-04-11 | Resolved: 2021-05-11

## 2021-05-11 PROBLEM — Z13.820 SCREENING FOR OSTEOPOROSIS: Status: RESOLVED | Noted: 2021-04-11 | Resolved: 2021-05-11

## 2021-05-11 PROBLEM — Z13.220 SCREENING FOR LIPID DISORDERS: Status: RESOLVED | Noted: 2021-04-11 | Resolved: 2021-05-11

## 2021-05-19 ENCOUNTER — OFFICE VISIT (OUTPATIENT)
Dept: FAMILY MEDICINE CLINIC | Age: 66
End: 2021-05-19
Payer: MEDICARE

## 2021-05-19 VITALS
HEART RATE: 85 BPM | OXYGEN SATURATION: 98 % | SYSTOLIC BLOOD PRESSURE: 120 MMHG | HEIGHT: 65 IN | BODY MASS INDEX: 24.72 KG/M2 | TEMPERATURE: 97.2 F | WEIGHT: 148.4 LBS | DIASTOLIC BLOOD PRESSURE: 78 MMHG

## 2021-05-19 DIAGNOSIS — Z79.899 ON STATIN THERAPY: ICD-10-CM

## 2021-05-19 DIAGNOSIS — I10 HYPERTENSION, UNSPECIFIED TYPE: ICD-10-CM

## 2021-05-19 DIAGNOSIS — E78.00 HYPERCHOLESTEROLEMIA: ICD-10-CM

## 2021-05-19 DIAGNOSIS — M17.11 OSTEOARTHRITIS OF RIGHT KNEE, UNSPECIFIED OSTEOARTHRITIS TYPE: ICD-10-CM

## 2021-05-19 DIAGNOSIS — R11.2 NAUSEA AND VOMITING, INTRACTABILITY OF VOMITING NOT SPECIFIED, UNSPECIFIED VOMITING TYPE: ICD-10-CM

## 2021-05-19 DIAGNOSIS — M75.81 ROTATOR CUFF TENDONITIS, RIGHT: Primary | ICD-10-CM

## 2021-05-19 DIAGNOSIS — E11.9 TYPE 2 DIABETES MELLITUS WITHOUT COMPLICATION, WITHOUT LONG-TERM CURRENT USE OF INSULIN (HCC): ICD-10-CM

## 2021-05-19 DIAGNOSIS — E55.9 VITAMIN D DEFICIENCY: ICD-10-CM

## 2021-05-19 PROCEDURE — 3046F HEMOGLOBIN A1C LEVEL >9.0%: CPT | Performed by: INTERNAL MEDICINE

## 2021-05-19 PROCEDURE — 4040F PNEUMOC VAC/ADMIN/RCVD: CPT | Performed by: INTERNAL MEDICINE

## 2021-05-19 PROCEDURE — 1036F TOBACCO NON-USER: CPT | Performed by: INTERNAL MEDICINE

## 2021-05-19 PROCEDURE — G8399 PT W/DXA RESULTS DOCUMENT: HCPCS | Performed by: INTERNAL MEDICINE

## 2021-05-19 PROCEDURE — G8427 DOCREV CUR MEDS BY ELIG CLIN: HCPCS | Performed by: INTERNAL MEDICINE

## 2021-05-19 PROCEDURE — 99214 OFFICE O/P EST MOD 30 MIN: CPT | Performed by: INTERNAL MEDICINE

## 2021-05-19 PROCEDURE — 3017F COLORECTAL CA SCREEN DOC REV: CPT | Performed by: INTERNAL MEDICINE

## 2021-05-19 PROCEDURE — 20610 DRAIN/INJ JOINT/BURSA W/O US: CPT | Performed by: INTERNAL MEDICINE

## 2021-05-19 PROCEDURE — 2022F DILAT RTA XM EVC RTNOPTHY: CPT | Performed by: INTERNAL MEDICINE

## 2021-05-19 PROCEDURE — G8420 CALC BMI NORM PARAMETERS: HCPCS | Performed by: INTERNAL MEDICINE

## 2021-05-19 PROCEDURE — 1090F PRES/ABSN URINE INCON ASSESS: CPT | Performed by: INTERNAL MEDICINE

## 2021-05-19 PROCEDURE — 1123F ACP DISCUSS/DSCN MKR DOCD: CPT | Performed by: INTERNAL MEDICINE

## 2021-05-19 RX ORDER — ROSUVASTATIN CALCIUM 10 MG/1
TABLET, COATED ORAL
Qty: 90 TABLET | Refills: 1 | Status: SHIPPED
Start: 2021-05-19 | End: 2021-09-01 | Stop reason: SDUPTHER

## 2021-05-19 RX ORDER — ONDANSETRON 4 MG/1
4 TABLET, ORALLY DISINTEGRATING ORAL 2 TIMES DAILY PRN
Qty: 20 TABLET | Refills: 1 | Status: SHIPPED | OUTPATIENT
Start: 2021-05-19 | End: 2021-09-01

## 2021-05-19 RX ORDER — METHYLPREDNISOLONE ACETATE 80 MG/ML
80 INJECTION, SUSPENSION INTRA-ARTICULAR; INTRALESIONAL; INTRAMUSCULAR; SOFT TISSUE ONCE
Status: COMPLETED | OUTPATIENT
Start: 2021-05-19 | End: 2021-05-19

## 2021-05-19 RX ORDER — DULAGLUTIDE 0.75 MG/.5ML
INJECTION, SOLUTION SUBCUTANEOUS
COMMUNITY
Start: 2021-04-21 | End: 2021-09-01

## 2021-05-19 RX ADMIN — METHYLPREDNISOLONE ACETATE 80 MG: 80 INJECTION, SUSPENSION INTRA-ARTICULAR; INTRALESIONAL; INTRAMUSCULAR; SOFT TISSUE at 20:17

## 2021-05-19 RX ADMIN — METHYLPREDNISOLONE ACETATE 80 MG: 80 INJECTION, SUSPENSION INTRA-ARTICULAR; INTRALESIONAL; INTRAMUSCULAR; SOFT TISSUE at 20:15

## 2021-05-19 ASSESSMENT — ENCOUNTER SYMPTOMS
RESPIRATORY NEGATIVE: 1
GASTROINTESTINAL NEGATIVE: 1
ALLERGIC/IMMUNOLOGIC NEGATIVE: 1
EYES NEGATIVE: 1

## 2021-05-19 NOTE — PROGRESS NOTES
[address] [place] [id number]     Date of Visit:  2021  Patient Name: Kathie Davis   Patient :  1955     CHIEF COMPLAINT:     Kathie Davis is a 72 y.o. female who presents today for an general visit to be evaluated for the following condition(s):  Chief Complaint   Patient presents with    Injections     Pt requesting an injection in the right shoulder and right knee    Discuss Medications     Trulicity       REVIEW OF SYSTEM      Review of Systems   Constitutional: Negative. HENT: Negative. Eyes: Negative. Respiratory: Negative. Cardiovascular: Negative. Gastrointestinal: Negative. Endocrine: Negative. Genitourinary: Negative. Musculoskeletal:        Shoulder pain    Knee pain   Skin: Negative. Allergic/Immunologic: Negative. Neurological: Negative. Hematological: Negative. Psychiatric/Behavioral: Negative. All other systems reviewed and are negative. HISTORY OF PRESENT ILLNESS     HPI   Patient here for follow up appt and to review labs. She also is here for right shoulder injection due to rotator cuff tendonitis and right knee injection due to knee OA. She has had injections in the past with good results. Her blood sugars on average have been elevated - her last physician changed her medications for her diabetes and started her on trulicity. She has been dealing with the side effect of nausea - Zofran has helped. She is waiting to see when she can get in with endocrine.     REVIEWED INFORMATION      Allergies   Allergen Reactions    Seasonal     Penicillins Rash       Patient Active Problem List   Diagnosis    Type 2 diabetes mellitus without complication ([address]    On statin therapy    Hypercholesterolemia    Vitamin D deficiency    Gastroesophageal reflux disease without esophagitis    Hypertension    Nausea and vomiting    Rotator cuff tendonitis, right Past Medical History:   Diagnosis Date    Diabetes mellitus ([facility] [address]     type 2    Hyperlipidemia        Past Surgical History:   Procedure Laterality Date    APPENDECTOMY  07/07/2016    laparoscopic    BREAST SURGERY      COLONOSCOPY      HYSTERECTOMY          Social History     Socioeconomic History    Marital status:      Spouse name: None    Number of children: None    Years of education: None    Highest education level: None   Occupational History    None   Tobacco Use    Smoking status: Never Smoker    Smokeless tobacco: Never Used   Vaping Use    Vaping Use: Never used   Substance and Sexual Activity    Alcohol use: No    Drug use: None    Sexual activity: None   Other Topics Concern    None   Social History Narrative    None     Social Determinants of Health     Financial Resource Strain: Low Risk     Difficulty of Paying Living Expenses: Not hard at all   Food Insecurity: No Food Insecurity    Worried About Running Out of Food in the Last Year: Never true    Len of Food in the Last Year: Never true   Transportation Needs: No Transportation Needs    Lack of Transportation (Medical): No    Lack of Transportation (Non-Medical):  No   Physical Activity:     Days of Exercise per Week:     Minutes of Exercise per Session:    Stress:     Feeling of Stress :    Social Connections:     Frequency of Communication with Friends and Family:     Frequency of Social Gatherings with Friends and Family:     Attends Mandaeism Services:     Active Member of Clubs or Organizations:     Attends Club or Organization Meetings:     Marital Status:    Intimate Partner Violence:     Fear of Current or Ex-Partner:     Emotionally Abused:     Physically Abused:     Sexually Abused:       Family History   Problem Relation Age of Onset    Asthma Mother     High Blood Pressure Mother     Other Mother     Depression Mother     Heart Disease Father     High Cholesterol Father     Osteoarthritis of right knee, unspecified osteoarthritis type  - methylPREDNISolone acetate (DEPO-MEDROL) injection 80 mg    6. Rotator cuff tendonitis, right  - methylPREDNISolone acetate (DEPO-MEDROL) injection 80 mg      Labs reviewed     Patient here for follow up appt     Diabetes - patient to check records for last eye exam, cont current regimen, cont to monitor home blood sugars, last A1C 9.8 just checked, this has increased since her previous physician made med changes and started trulicity. . She is having a lot of nausea with the trulicity - we discussed medication changes but will wait to see if she can see endo soon. Referral, notes and lab results refaxed and confirmed with endo   Cont working to optimize diet and healthy activity     Nausea - discussed this could be gastroparesis vs trulicity side effects. , also discussed seeing GI locally - patient would like to monitor and check back in with me in a few weeks     Right shoulder RTC tendonitis and knee OA - patient has benefited from injections in the past.  .Right shoulder prepped an injected with 2 cc 1% lidocaine and 80 mg depomedrol, patient tolerated procedure well. Right knee prepped and injected with 1 cc 1% lidocaine and 80 mg depomedrol. Patient tolerated both injections well     Cholesterol - cont med, cont working to optimize diet and healthy activity, recheck in 5 months     Vit d Def  - cont diet rich in Vit D, recheck in 5 months     Reflux - cont med, avoid triggers     Anxiety/depression - cont med, sx well controlled currently     Allergies/asthma - has been stable, used meds for flare ups     Patient to call and let me know when her endo appt is. I will likely see her back in a month or two for follow up.   To call if symptoms worsen or if blood sugars increase more.     Labs and annual physical in 4  months        COMMUNICATION:       Electronically signed by Lashon Verdugo MD on 5/19/2021 at 10:15 AM

## 2021-05-24 ENCOUNTER — TELEPHONE (OUTPATIENT)
Dept: FAMILY MEDICINE CLINIC | Age: 66
End: 2021-05-24

## 2021-06-02 RX ORDER — GABAPENTIN 600 MG/1
1200 TABLET ORAL NIGHTLY
Qty: 180 TABLET | Refills: 0 | Status: SHIPPED
Start: 2021-06-02 | End: 2021-09-01 | Stop reason: SDUPTHER

## 2021-08-02 ENCOUNTER — HOSPITAL ENCOUNTER (OUTPATIENT)
Age: 66
Setting detail: SPECIMEN
Discharge: HOME | End: 2021-08-02
Payer: MEDICARE

## 2021-08-02 LAB
ESTIMATED AVERAGE GLUCOSE: 180 MG/DL
HBA1C MFR BLD: 7.9 % (ref 4–6)

## 2021-08-03 RX ORDER — MIRTAZAPINE 30 MG/1
30 TABLET, FILM COATED ORAL NIGHTLY
Qty: 90 TABLET | Refills: 1 | Status: SHIPPED | OUTPATIENT
Start: 2021-08-03 | End: 2021-11-29 | Stop reason: SDUPTHER

## 2021-08-03 RX ORDER — LOSARTAN POTASSIUM 25 MG/1
25 TABLET ORAL DAILY
Qty: 90 TABLET | Refills: 1 | Status: SHIPPED
Start: 2021-08-03 | End: 2021-09-01 | Stop reason: SDUPTHER

## 2021-08-03 RX ORDER — OMEPRAZOLE 40 MG/1
40 CAPSULE, DELAYED RELEASE ORAL
Qty: 90 CAPSULE | Refills: 1 | Status: SHIPPED | OUTPATIENT
Start: 2021-08-03 | End: 2021-11-29 | Stop reason: SDUPTHER

## 2021-08-31 RX ORDER — INSULIN ASPART 100 [IU]/ML
INJECTION, SOLUTION INTRAVENOUS; SUBCUTANEOUS
COMMUNITY
Start: 2021-07-12

## 2021-08-31 RX ORDER — PEN NEEDLE, DIABETIC 32GX 5/32"
NEEDLE, DISPOSABLE MISCELLANEOUS
COMMUNITY
Start: 2021-06-03

## 2021-08-31 RX ORDER — INSULIN GLARGINE 100 [IU]/ML
22 INJECTION, SOLUTION SUBCUTANEOUS NIGHTLY
COMMUNITY
Start: 2021-07-12

## 2021-09-01 ENCOUNTER — OFFICE VISIT (OUTPATIENT)
Dept: FAMILY MEDICINE CLINIC | Age: 66
End: 2021-09-01
Payer: MEDICARE

## 2021-09-01 VITALS
WEIGHT: 154 LBS | OXYGEN SATURATION: 96 % | BODY MASS INDEX: 25.66 KG/M2 | HEIGHT: 65 IN | DIASTOLIC BLOOD PRESSURE: 72 MMHG | SYSTOLIC BLOOD PRESSURE: 122 MMHG | HEART RATE: 82 BPM

## 2021-09-01 DIAGNOSIS — M25.561 RIGHT KNEE PAIN, UNSPECIFIED CHRONICITY: ICD-10-CM

## 2021-09-01 DIAGNOSIS — E78.00 HYPERCHOLESTEROLEMIA: ICD-10-CM

## 2021-09-01 DIAGNOSIS — M25.511 RIGHT SHOULDER PAIN, UNSPECIFIED CHRONICITY: ICD-10-CM

## 2021-09-01 DIAGNOSIS — E11.9 TYPE 2 DIABETES MELLITUS WITHOUT COMPLICATION, WITHOUT LONG-TERM CURRENT USE OF INSULIN (HCC): Primary | ICD-10-CM

## 2021-09-01 DIAGNOSIS — E55.9 VITAMIN D DEFICIENCY: ICD-10-CM

## 2021-09-01 DIAGNOSIS — M75.81 ROTATOR CUFF TENDONITIS, RIGHT: ICD-10-CM

## 2021-09-01 DIAGNOSIS — I10 HYPERTENSION, UNSPECIFIED TYPE: ICD-10-CM

## 2021-09-01 DIAGNOSIS — M17.11 OSTEOARTHRITIS OF RIGHT KNEE, UNSPECIFIED OSTEOARTHRITIS TYPE: ICD-10-CM

## 2021-09-01 DIAGNOSIS — K21.9 GASTROESOPHAGEAL REFLUX DISEASE WITHOUT ESOPHAGITIS: ICD-10-CM

## 2021-09-01 DIAGNOSIS — Z79.899 ON STATIN THERAPY: ICD-10-CM

## 2021-09-01 PROBLEM — R11.2 NAUSEA AND VOMITING: Status: RESOLVED | Noted: 2021-05-04 | Resolved: 2021-09-01

## 2021-09-01 PROCEDURE — 1036F TOBACCO NON-USER: CPT | Performed by: INTERNAL MEDICINE

## 2021-09-01 PROCEDURE — 4040F PNEUMOC VAC/ADMIN/RCVD: CPT | Performed by: INTERNAL MEDICINE

## 2021-09-01 PROCEDURE — G8399 PT W/DXA RESULTS DOCUMENT: HCPCS | Performed by: INTERNAL MEDICINE

## 2021-09-01 PROCEDURE — 1123F ACP DISCUSS/DSCN MKR DOCD: CPT | Performed by: INTERNAL MEDICINE

## 2021-09-01 PROCEDURE — 3051F HG A1C>EQUAL 7.0%<8.0%: CPT | Performed by: INTERNAL MEDICINE

## 2021-09-01 PROCEDURE — 99214 OFFICE O/P EST MOD 30 MIN: CPT | Performed by: INTERNAL MEDICINE

## 2021-09-01 PROCEDURE — 1090F PRES/ABSN URINE INCON ASSESS: CPT | Performed by: INTERNAL MEDICINE

## 2021-09-01 PROCEDURE — 2022F DILAT RTA XM EVC RTNOPTHY: CPT | Performed by: INTERNAL MEDICINE

## 2021-09-01 PROCEDURE — G8417 CALC BMI ABV UP PARAM F/U: HCPCS | Performed by: INTERNAL MEDICINE

## 2021-09-01 PROCEDURE — 20610 DRAIN/INJ JOINT/BURSA W/O US: CPT | Performed by: INTERNAL MEDICINE

## 2021-09-01 PROCEDURE — G8427 DOCREV CUR MEDS BY ELIG CLIN: HCPCS | Performed by: INTERNAL MEDICINE

## 2021-09-01 PROCEDURE — 3017F COLORECTAL CA SCREEN DOC REV: CPT | Performed by: INTERNAL MEDICINE

## 2021-09-01 RX ORDER — METHYLPREDNISOLONE ACETATE 80 MG/ML
80 INJECTION, SUSPENSION INTRA-ARTICULAR; INTRALESIONAL; INTRAMUSCULAR; SOFT TISSUE ONCE
Status: COMPLETED | OUTPATIENT
Start: 2021-09-01 | End: 2021-09-01

## 2021-09-01 RX ORDER — GABAPENTIN 600 MG/1
1200 TABLET ORAL NIGHTLY
Qty: 180 TABLET | Refills: 1 | Status: SHIPPED | OUTPATIENT
Start: 2021-09-01 | End: 2021-11-29 | Stop reason: SDUPTHER

## 2021-09-01 RX ORDER — FLASH GLUCOSE SENSOR
KIT MISCELLANEOUS
COMMUNITY
Start: 2021-06-14 | End: 2022-02-09

## 2021-09-01 RX ORDER — METHYLPREDNISOLONE ACETATE 80 MG/ML
80 INJECTION, SUSPENSION INTRA-ARTICULAR; INTRALESIONAL; INTRAMUSCULAR; SOFT TISSUE ONCE
Status: DISCONTINUED | OUTPATIENT
Start: 2021-09-01 | End: 2021-09-01

## 2021-09-01 RX ORDER — MONTELUKAST SODIUM 10 MG/1
10 TABLET ORAL NIGHTLY
Qty: 90 TABLET | Refills: 1 | Status: SHIPPED | OUTPATIENT
Start: 2021-09-01 | End: 2021-11-29 | Stop reason: SDUPTHER

## 2021-09-01 RX ORDER — ROSUVASTATIN CALCIUM 10 MG/1
TABLET, COATED ORAL
Qty: 90 TABLET | Refills: 1 | Status: SHIPPED | OUTPATIENT
Start: 2021-09-01 | End: 2021-11-29 | Stop reason: SDUPTHER

## 2021-09-01 RX ORDER — ACYCLOVIR 400 MG/1
400 TABLET ORAL 2 TIMES DAILY
Qty: 180 TABLET | Refills: 0 | Status: SHIPPED | OUTPATIENT
Start: 2021-09-01 | End: 2022-08-05 | Stop reason: ALTCHOICE

## 2021-09-01 RX ORDER — LOSARTAN POTASSIUM 25 MG/1
25 TABLET ORAL DAILY
Qty: 90 TABLET | Refills: 1 | Status: SHIPPED | OUTPATIENT
Start: 2021-09-01 | End: 2021-10-29 | Stop reason: SDUPTHER

## 2021-09-01 RX ADMIN — METHYLPREDNISOLONE ACETATE 80 MG: 80 INJECTION, SUSPENSION INTRA-ARTICULAR; INTRALESIONAL; INTRAMUSCULAR; SOFT TISSUE at 13:27

## 2021-09-01 RX ADMIN — METHYLPREDNISOLONE ACETATE 80 MG: 80 INJECTION, SUSPENSION INTRA-ARTICULAR; INTRALESIONAL; INTRAMUSCULAR; SOFT TISSUE at 16:13

## 2021-09-01 RX ADMIN — METHYLPREDNISOLONE ACETATE 80 MG: 80 INJECTION, SUSPENSION INTRA-ARTICULAR; INTRALESIONAL; INTRAMUSCULAR; SOFT TISSUE at 16:14

## 2021-09-01 ASSESSMENT — ENCOUNTER SYMPTOMS
GASTROINTESTINAL NEGATIVE: 1
EYES NEGATIVE: 1
ALLERGIC/IMMUNOLOGIC NEGATIVE: 1
RESPIRATORY NEGATIVE: 1

## 2021-09-01 NOTE — PROGRESS NOTES
[address]   [facility]  [address]  [place], [facility] [place] [facility]      Date of Visit:  2021  Patient Name: Jazmín Ceron   Patient :  1955     CHIEF COMPLAINT:     Jazmín Ceron is a 72 y.o. female who presents today for an general visit to be evaluated for the following condition(s):  Chief Complaint   Patient presents with    Diabetes    Knee Pain     Right knee    Shoulder Pain     Right shoulder        REVIEW OF SYSTEM      Review of Systems   Constitutional: Negative. HENT: Negative. Eyes: Negative. Respiratory: Negative. Cardiovascular: Negative. Gastrointestinal: Negative. Endocrine: Negative. Genitourinary: Negative. Musculoskeletal:        Right Knee pain, right shoulder pain   Skin: Negative. Allergic/Immunologic: Negative. Neurological: Negative. Hematological: Negative. Psychiatric/Behavioral: Negative. All other systems reviewed and are negative. HISTORY OF PRESENT ILLNESS     HPI    Patient is here to follow up and medication review. Patient has Right knee pain due to OA and shoulder pain for years and requesting steroid injection. Last injection per patient was May 19, 2021. Patient feels the injections were very helpful. Overall her sugars are much better - she is working with [employer] and her last A1C was much improved.       REVIEWED INFORMATION      Allergies   Allergen Reactions    Seasonal     Penicillins Rash       Patient Active Problem List   Diagnosis    Type 2 diabetes mellitus without complication ([address]    On statin therapy    Hypercholesterolemia    Vitamin D deficiency    Gastroesophageal reflux disease without esophagitis    Hypertension    Nausea and vomiting    Rotator cuff tendonitis, right    Osteoarthritis of right knee       Past Medical History:   Diagnosis Date    Diabetes mellitus ([address]     type 2    Hyperlipidemia        Past Surgical History:   Procedure Laterality Date    APPENDECTOMY  07/07/2016    laparoscopic    BREAST SURGERY      COLONOSCOPY      HYSTERECTOMY          Social History     Socioeconomic History    Marital status:      Spouse name: Not on file    Number of children: Not on file    Years of education: Not on file    Highest education level: Not on file   Occupational History    Not on file   Tobacco Use    Smoking status: Never Smoker    Smokeless tobacco: Never Used   Vaping Use    Vaping Use: Never used   Substance and Sexual Activity    Alcohol use: No    Drug use: Not on file    Sexual activity: Not on file   Other Topics Concern    Not on file   Social History Narrative    Not on file     Social Determinants of Health     Financial Resource Strain: Low Risk     Difficulty of Paying Living Expenses: Not hard at all   Food Insecurity: No Food Insecurity    Worried About [address] in the Last Year: Never true    [address] [employer] in the Last Year: Never true   Transportation Needs: No Transportation Needs    Lack of Transportation (Medical): No    Lack of Transportation (Non-Medical):  No   Physical Activity:     Days of Exercise per Week:     Minutes of Exercise per Session:    Stress:     Feeling of Stress :    Social Connections:     Frequency of Communication with Friends and Family:     Frequency of Social Gatherings with Friends and Family:     Attends Jew Services:     Active Member of Clubs or Organizations:     Attends Club or Organization Meetings:     Marital Status:    Intimate Partner Violence:     Fear of Current or Ex-Partner:     Emotionally Abused:     Physically Abused:     Sexually Abused:       Family History   Problem Relation Age of Onset    Asthma Mother     High Blood Pressure Mother     Other Mother     Depression Mother     Heart Disease Father     High Cholesterol Father     High Blood Pressure Father     High Cholesterol Brother        PHYSICAL EXAM     /72   Pulse 82   Ht 5' 5\" (1.651 m)   Wt 154 lb (69.9 kg)   SpO2 96%   BMI 25.63 kg/m²    Physical Exam  Vitals and nursing note reviewed. Constitutional:       Appearance: Normal appearance. She is normal weight. HENT:      Head: Normocephalic and atraumatic. Right Ear: Tympanic membrane, ear canal and external ear normal.      Left Ear: Tympanic membrane, ear canal and external ear normal.      Nose: Nose normal.      Mouth/Throat:      Mouth: Mucous membranes are moist.   Eyes:      Extraocular Movements: Extraocular movements intact. Conjunctiva/sclera: Conjunctivae normal.      Pupils: Pupils are equal, round, and reactive to light. Cardiovascular:      Rate and Rhythm: Normal rate and regular rhythm. Pulses: Normal pulses. Heart sounds: Normal heart sounds. Pulmonary:      Effort: Pulmonary effort is normal.      Breath sounds: Normal breath sounds. Abdominal:      General: Abdomen is flat. Bowel sounds are normal.      Palpations: Abdomen is soft. Musculoskeletal:      Cervical back: Normal range of motion and neck supple. Comments: Right shoulder with impingement and pain with supraspinatus testing    Right knee with crepitus and grind    Skin:     General: Skin is warm. Capillary Refill: Capillary refill takes less than 2 seconds. Neurological:      General: No focal deficit present. Mental Status: She is alert and oriented to person, place, and time. Psychiatric:         Mood and Affect: Mood normal.         Behavior: Behavior normal.         Thought Content: Thought content normal.         Judgment: Judgment normal.         ASSESSMENT/PLAN     1. Type 2 diabetes mellitus without complication, without long-term current use of insulin ([facility] [address]    2. Vitamin D deficiency    3. On statin therapy    4. Hypercholesterolemia    5. Hypertension, unspecified type    6.  Gastroesophageal reflux disease without esophagitis    7. Right knee pain, unspecified chronicity  - methylPREDNISolone acetate (DEPO-MEDROL) injection 80 mg    8. Right shoulder pain, unspecified chronicity  - methylPREDNISolone acetate (DEPO-MEDROL) injection 80 mg    9. Osteoarthritis of right knee, unspecified osteoarthritis type  - methylPREDNISolone acetate (DEPO-MEDROL) injection 80 mg    10. Rotator cuff tendonitis, right  - methylPREDNISolone acetate (DEPO-MEDROL) injection 80 mg      Records reviewed    DM - cont current regimen, working with endo, a1C much improved, recheck scheduled    Right shoulder RTC tendonitis and knee OA - patient has benefited from injections in the past.  .Right shoulder prepped an injected with 2 cc 1% lidocaine and 80 mg depomedrol, patient tolerated procedure well. Right knee prepped and injected with 1 cc 1% lidocaine and 80 mg depomedrol. Patient tolerated both injections well     Cholesterol - cont med, cont working to optimize diet and healthy activity, recheck scheduled     Vit d Def  - cont diet rich in Vit D, recheck scheduled     Reflux - cont med, avoid triggers     Anxiety/depression - cont med, sx well controlled currently     Allergies/asthma - has been stable, used meds for flare ups    Return for Medicare annual wellness in November 2021.     COMMUNICATION:       Electronically signed by Bubba Bosch MD on 9/1/2021 at 9:37 AM

## 2021-10-20 RX ORDER — IBUPROFEN 800 MG/1
800 TABLET ORAL 2 TIMES DAILY PRN
Qty: 30 TABLET | Refills: 0 | Status: SHIPPED | OUTPATIENT
Start: 2021-10-20 | End: 2021-11-29 | Stop reason: SDUPTHER

## 2021-10-29 RX ORDER — LOSARTAN POTASSIUM 25 MG/1
25 TABLET ORAL DAILY
Qty: 90 TABLET | Refills: 1 | Status: SHIPPED | OUTPATIENT
Start: 2021-10-29 | End: 2021-11-29 | Stop reason: SDUPTHER

## 2021-11-26 ENCOUNTER — HOSPITAL ENCOUNTER (OUTPATIENT)
Age: 66
Discharge: HOME | End: 2021-11-26
Payer: MEDICARE

## 2021-11-26 DIAGNOSIS — K21.9 GASTROESOPHAGEAL REFLUX DISEASE WITHOUT ESOPHAGITIS: ICD-10-CM

## 2021-11-26 DIAGNOSIS — E11.9 TYPE 2 DIABETES MELLITUS WITHOUT COMPLICATION, WITHOUT LONG-TERM CURRENT USE OF INSULIN (HCC): ICD-10-CM

## 2021-11-26 DIAGNOSIS — E55.9 VITAMIN D DEFICIENCY: ICD-10-CM

## 2021-11-26 DIAGNOSIS — R11.2 NAUSEA AND VOMITING, INTRACTABILITY OF VOMITING NOT SPECIFIED, UNSPECIFIED VOMITING TYPE: ICD-10-CM

## 2021-11-26 DIAGNOSIS — I10 HYPERTENSION, UNSPECIFIED TYPE: ICD-10-CM

## 2021-11-26 DIAGNOSIS — Z79.899 ON STATIN THERAPY: ICD-10-CM

## 2021-11-26 DIAGNOSIS — Z13.6 SCREENING FOR CARDIOVASCULAR CONDITION: ICD-10-CM

## 2021-11-26 LAB
ABSOLUTE EOS #: 0.37 K/UL (ref 0–0.44)
ABSOLUTE IMMATURE GRANULOCYTE: <0.03 K/UL (ref 0–0.3)
ABSOLUTE LYMPH #: 1.33 K/UL (ref 1.1–3.7)
ABSOLUTE MONO #: 0.47 K/UL (ref 0.1–1.2)
ALBUMIN SERPL-MCNC: 4.3 G/DL (ref 3.5–5.2)
ALBUMIN/GLOBULIN RATIO: 1.8 (ref 1–2.5)
ALP BLD-CCNC: 95 U/L (ref 35–104)
ALT SERPL-CCNC: 19 U/L (ref 5–33)
ANION GAP SERPL CALCULATED.3IONS-SCNC: 13 MMOL/L (ref 9–17)
AST SERPL-CCNC: 23 U/L
BASOPHILS # BLD: 1 % (ref 0–2)
BASOPHILS ABSOLUTE: 0.06 K/UL (ref 0–0.2)
BILIRUB SERPL-MCNC: 0.4 MG/DL (ref 0.3–1.2)
BUN BLDV-MCNC: 15 MG/DL (ref 8–23)
BUN/CREAT BLD: ABNORMAL (ref 9–20)
C-REACTIVE PROTEIN: 7.9 MG/L (ref 0–5)
CALCIUM SERPL-MCNC: 9.8 MG/DL (ref 8.6–10.4)
CHLORIDE BLD-SCNC: 104 MMOL/L (ref 98–107)
CHOLESTEROL, FASTING: 149 MG/DL
CHOLESTEROL/HDL RATIO: 2.6
CO2: 23 MMOL/L (ref 20–31)
CREAT SERPL-MCNC: 0.71 MG/DL (ref 0.5–0.9)
CREATININE URINE: 73.4 MG/DL (ref 28–217)
DIFFERENTIAL TYPE: ABNORMAL
EOSINOPHILS RELATIVE PERCENT: 8 % (ref 1–4)
GFR AFRICAN AMERICAN: >60 ML/MIN
GFR NON-AFRICAN AMERICAN: >60 ML/MIN
GFR SERPL CREATININE-BSD FRML MDRD: ABNORMAL ML/MIN/{1.73_M2}
GFR SERPL CREATININE-BSD FRML MDRD: ABNORMAL ML/MIN/{1.73_M2}
GLUCOSE FASTING: 104 MG/DL (ref 70–99)
HCT VFR BLD CALC: 41.6 % (ref 36.3–47.1)
HDLC SERPL-MCNC: 58 MG/DL
HEMOGLOBIN: 13.2 G/DL (ref 11.9–15.1)
IMMATURE GRANULOCYTES: 0 %
LDL CHOLESTEROL: 72 MG/DL (ref 0–130)
LYMPHOCYTES # BLD: 28 % (ref 24–43)
MCH RBC QN AUTO: 28.6 PG (ref 25.2–33.5)
MCHC RBC AUTO-ENTMCNC: 31.7 G/DL (ref 28.4–34.8)
MCV RBC AUTO: 90 FL (ref 82.6–102.9)
MICROALBUMIN/CREAT 24H UR: 27 MG/L
MICROALBUMIN/CREAT UR-RTO: 37 MCG/MG CREAT
MONOCYTES # BLD: 10 % (ref 3–12)
NRBC AUTOMATED: 0 PER 100 WBC
PDW BLD-RTO: 13.4 % (ref 11.8–14.4)
PLATELET # BLD: 288 K/UL (ref 138–453)
PLATELET ESTIMATE: ABNORMAL
PMV BLD AUTO: 10.2 FL (ref 8.1–13.5)
POTASSIUM SERPL-SCNC: 4.3 MMOL/L (ref 3.7–5.3)
RBC # BLD: 4.62 M/UL (ref 3.95–5.11)
RBC # BLD: ABNORMAL 10*6/UL
SEG NEUTROPHILS: 53 % (ref 36–65)
SEGMENTED NEUTROPHILS ABSOLUTE COUNT: 2.58 K/UL (ref 1.5–8.1)
SODIUM BLD-SCNC: 140 MMOL/L (ref 135–144)
TOTAL PROTEIN: 6.7 G/DL (ref 6.4–8.3)
TRIGLYCERIDE, FASTING: 95 MG/DL
TSH SERPL DL<=0.05 MIU/L-ACNC: 0.58 MIU/L (ref 0.3–5)
VITAMIN D 25-HYDROXY: 35.9 NG/ML (ref 30–100)
VLDLC SERPL CALC-MCNC: NORMAL MG/DL (ref 1–30)
WBC # BLD: 4.8 K/UL (ref 3.5–11.3)
WBC # BLD: ABNORMAL 10*3/UL

## 2021-11-26 PROCEDURE — 36415 COLL VENOUS BLD VENIPUNCTURE: CPT

## 2021-11-26 PROCEDURE — 84443 ASSAY THYROID STIM HORMONE: CPT

## 2021-11-26 PROCEDURE — 80061 LIPID PANEL: CPT

## 2021-11-26 PROCEDURE — 82306 VITAMIN D 25 HYDROXY: CPT

## 2021-11-26 PROCEDURE — 82043 UR ALBUMIN QUANTITATIVE: CPT

## 2021-11-26 PROCEDURE — 80053 COMPREHEN METABOLIC PANEL: CPT

## 2021-11-26 PROCEDURE — 82570 ASSAY OF URINE CREATININE: CPT

## 2021-11-26 PROCEDURE — 83036 HEMOGLOBIN GLYCOSYLATED A1C: CPT

## 2021-11-26 PROCEDURE — 85025 COMPLETE CBC W/AUTO DIFF WBC: CPT

## 2021-11-26 PROCEDURE — 86140 C-REACTIVE PROTEIN: CPT

## 2021-11-29 ENCOUNTER — OFFICE VISIT (OUTPATIENT)
Dept: FAMILY MEDICINE CLINIC | Age: 66
End: 2021-11-29
Payer: MEDICARE

## 2021-11-29 VITALS
BODY MASS INDEX: 27.49 KG/M2 | OXYGEN SATURATION: 97 % | HEIGHT: 65 IN | HEART RATE: 76 BPM | SYSTOLIC BLOOD PRESSURE: 132 MMHG | DIASTOLIC BLOOD PRESSURE: 78 MMHG | WEIGHT: 165 LBS

## 2021-11-29 DIAGNOSIS — K21.9 GASTROESOPHAGEAL REFLUX DISEASE WITHOUT ESOPHAGITIS: ICD-10-CM

## 2021-11-29 DIAGNOSIS — M25.561 RIGHT KNEE PAIN, UNSPECIFIED CHRONICITY: ICD-10-CM

## 2021-11-29 DIAGNOSIS — E11.9 TYPE 2 DIABETES MELLITUS WITHOUT COMPLICATION, WITHOUT LONG-TERM CURRENT USE OF INSULIN (HCC): ICD-10-CM

## 2021-11-29 DIAGNOSIS — M75.81 ROTATOR CUFF TENDONITIS, RIGHT: ICD-10-CM

## 2021-11-29 DIAGNOSIS — L20.9 ATOPIC DERMATITIS, UNSPECIFIED TYPE: ICD-10-CM

## 2021-11-29 DIAGNOSIS — Z79.899 ON STATIN THERAPY: ICD-10-CM

## 2021-11-29 DIAGNOSIS — Z13.6 SCREENING FOR HEART DISEASE: ICD-10-CM

## 2021-11-29 DIAGNOSIS — Z00.00 MEDICARE ANNUAL WELLNESS VISIT, SUBSEQUENT: Primary | ICD-10-CM

## 2021-11-29 DIAGNOSIS — M17.11 OSTEOARTHRITIS OF RIGHT KNEE, UNSPECIFIED OSTEOARTHRITIS TYPE: ICD-10-CM

## 2021-11-29 DIAGNOSIS — I10 HYPERTENSION, UNSPECIFIED TYPE: ICD-10-CM

## 2021-11-29 DIAGNOSIS — M25.511 RIGHT SHOULDER PAIN, UNSPECIFIED CHRONICITY: ICD-10-CM

## 2021-11-29 DIAGNOSIS — E78.00 HYPERCHOLESTEROLEMIA: ICD-10-CM

## 2021-11-29 DIAGNOSIS — E55.9 VITAMIN D DEFICIENCY: ICD-10-CM

## 2021-11-29 DIAGNOSIS — Z12.31 ENCOUNTER FOR SCREENING MAMMOGRAM FOR MALIGNANT NEOPLASM OF BREAST: ICD-10-CM

## 2021-11-29 LAB
ESTIMATED AVERAGE GLUCOSE: 151 MG/DL
HBA1C MFR BLD: 6.9 %
HBA1C MFR BLD: 6.9 % (ref 4–6)

## 2021-11-29 PROCEDURE — 3044F HG A1C LEVEL LT 7.0%: CPT | Performed by: INTERNAL MEDICINE

## 2021-11-29 PROCEDURE — G8427 DOCREV CUR MEDS BY ELIG CLIN: HCPCS | Performed by: INTERNAL MEDICINE

## 2021-11-29 PROCEDURE — 93000 ELECTROCARDIOGRAM COMPLETE: CPT | Performed by: INTERNAL MEDICINE

## 2021-11-29 PROCEDURE — G8484 FLU IMMUNIZE NO ADMIN: HCPCS | Performed by: INTERNAL MEDICINE

## 2021-11-29 PROCEDURE — 1123F ACP DISCUSS/DSCN MKR DOCD: CPT | Performed by: INTERNAL MEDICINE

## 2021-11-29 PROCEDURE — 4040F PNEUMOC VAC/ADMIN/RCVD: CPT | Performed by: INTERNAL MEDICINE

## 2021-11-29 PROCEDURE — G0402 INITIAL PREVENTIVE EXAM: HCPCS | Performed by: INTERNAL MEDICINE

## 2021-11-29 PROCEDURE — 1090F PRES/ABSN URINE INCON ASSESS: CPT | Performed by: INTERNAL MEDICINE

## 2021-11-29 PROCEDURE — 3017F COLORECTAL CA SCREEN DOC REV: CPT | Performed by: INTERNAL MEDICINE

## 2021-11-29 PROCEDURE — G8399 PT W/DXA RESULTS DOCUMENT: HCPCS | Performed by: INTERNAL MEDICINE

## 2021-11-29 PROCEDURE — 99214 OFFICE O/P EST MOD 30 MIN: CPT | Performed by: INTERNAL MEDICINE

## 2021-11-29 PROCEDURE — 1036F TOBACCO NON-USER: CPT | Performed by: INTERNAL MEDICINE

## 2021-11-29 PROCEDURE — G8417 CALC BMI ABV UP PARAM F/U: HCPCS | Performed by: INTERNAL MEDICINE

## 2021-11-29 PROCEDURE — 2022F DILAT RTA XM EVC RTNOPTHY: CPT | Performed by: INTERNAL MEDICINE

## 2021-11-29 PROCEDURE — 83036 HEMOGLOBIN GLYCOSYLATED A1C: CPT | Performed by: INTERNAL MEDICINE

## 2021-11-29 RX ORDER — GABAPENTIN 600 MG/1
1200 TABLET ORAL NIGHTLY
Qty: 180 TABLET | Refills: 1 | Status: SHIPPED | OUTPATIENT
Start: 2021-11-29 | End: 2022-03-08 | Stop reason: SDUPTHER

## 2021-11-29 RX ORDER — FLUTICASONE PROPIONATE 50 MCG
2 SPRAY, SUSPENSION (ML) NASAL DAILY
Qty: 3 EACH | Refills: 1 | Status: SHIPPED | OUTPATIENT
Start: 2021-11-29

## 2021-11-29 RX ORDER — OMEPRAZOLE 40 MG/1
40 CAPSULE, DELAYED RELEASE ORAL
Qty: 90 CAPSULE | Refills: 1 | Status: SHIPPED | OUTPATIENT
Start: 2021-11-29 | End: 2022-06-06 | Stop reason: SDUPTHER

## 2021-11-29 RX ORDER — LOSARTAN POTASSIUM 25 MG/1
25 TABLET ORAL DAILY
Qty: 90 TABLET | Refills: 1 | Status: SHIPPED | OUTPATIENT
Start: 2021-11-29 | End: 2022-01-24 | Stop reason: SDUPTHER

## 2021-11-29 RX ORDER — MIRTAZAPINE 30 MG/1
30 TABLET, FILM COATED ORAL NIGHTLY
Qty: 90 TABLET | Refills: 1 | Status: SHIPPED | OUTPATIENT
Start: 2021-11-29 | End: 2022-01-24 | Stop reason: SDUPTHER

## 2021-11-29 RX ORDER — MONTELUKAST SODIUM 10 MG/1
10 TABLET ORAL NIGHTLY
Qty: 90 TABLET | Refills: 1 | Status: SHIPPED | OUTPATIENT
Start: 2021-11-29 | End: 2022-06-06 | Stop reason: SDUPTHER

## 2021-11-29 RX ORDER — ESCITALOPRAM OXALATE 5 MG/1
5 TABLET ORAL NIGHTLY
Qty: 90 TABLET | Refills: 1 | Status: SHIPPED | OUTPATIENT
Start: 2021-11-29 | End: 2022-06-06 | Stop reason: SDUPTHER

## 2021-11-29 RX ORDER — IBUPROFEN 800 MG/1
800 TABLET ORAL 2 TIMES DAILY PRN
Qty: 60 TABLET | Refills: 1 | Status: SHIPPED | OUTPATIENT
Start: 2021-11-29

## 2021-11-29 RX ORDER — ROSUVASTATIN CALCIUM 10 MG/1
TABLET, COATED ORAL
Qty: 90 TABLET | Refills: 1 | Status: SHIPPED | OUTPATIENT
Start: 2021-11-29 | End: 2022-06-06 | Stop reason: SDUPTHER

## 2021-11-29 ASSESSMENT — PATIENT HEALTH QUESTIONNAIRE - PHQ9
2. FEELING DOWN, DEPRESSED OR HOPELESS: 0
SUM OF ALL RESPONSES TO PHQ9 QUESTIONS 1 & 2: 0
SUM OF ALL RESPONSES TO PHQ QUESTIONS 1-9: 0
1. LITTLE INTEREST OR PLEASURE IN DOING THINGS: 0
SUM OF ALL RESPONSES TO PHQ QUESTIONS 1-9: 0
SUM OF ALL RESPONSES TO PHQ QUESTIONS 1-9: 0

## 2021-11-29 ASSESSMENT — LIFESTYLE VARIABLES
HOW MANY STANDARD DRINKS CONTAINING ALCOHOL DO YOU HAVE ON A TYPICAL DAY: 0
HOW OFTEN DURING THE LAST YEAR HAVE YOU NEEDED AN ALCOHOLIC DRINK FIRST THING IN THE MORNING TO GET YOURSELF GOING AFTER A NIGHT OF HEAVY DRINKING: 0
HOW OFTEN DURING THE LAST YEAR HAVE YOU HAD A FEELING OF GUILT OR REMORSE AFTER DRINKING: 0
HOW OFTEN DURING THE LAST YEAR HAVE YOU FAILED TO DO WHAT WAS NORMALLY EXPECTED FROM YOU BECAUSE OF DRINKING: 0
HOW OFTEN DO YOU HAVE SIX OR MORE DRINKS ON ONE OCCASION: 0
HOW OFTEN DURING THE LAST YEAR HAVE YOU FOUND THAT YOU WERE NOT ABLE TO STOP DRINKING ONCE YOU HAD STARTED: 0
AUDIT-C TOTAL SCORE: 1
HAS A RELATIVE, FRIEND, DOCTOR, OR ANOTHER HEALTH PROFESSIONAL EXPRESSED CONCERN ABOUT YOUR DRINKING OR SUGGESTED YOU CUT DOWN: 0
HOW OFTEN DURING THE LAST YEAR HAVE YOU BEEN UNABLE TO REMEMBER WHAT HAPPENED THE NIGHT BEFORE BECAUSE YOU HAD BEEN DRINKING: 0
HAVE YOU OR SOMEONE ELSE BEEN INJURED AS A RESULT OF YOUR DRINKING: 0
HOW OFTEN DO YOU HAVE A DRINK CONTAINING ALCOHOL: 1
AUDIT TOTAL SCORE: 1

## 2021-11-29 ASSESSMENT — ENCOUNTER SYMPTOMS
EYES NEGATIVE: 1
RESPIRATORY NEGATIVE: 1
GASTROINTESTINAL NEGATIVE: 1
ALLERGIC/IMMUNOLOGIC NEGATIVE: 1

## 2021-11-29 NOTE — PROGRESS NOTES
[address]   [facility]  [address], [address]  [facility], [address]      Date of Visit:  2021  Patient Name: Ponce Salmeron   Patient :  1955     CHIEF COMPLAINT:     Ponce Salmeron is a 72 y.o. female who presents today for an general visit to be evaluated for the following condition(s):  Chief Complaint   Patient presents with    Medicare AWV    Knee Pain    Shoulder Pain    Referral - General       REVIEW OF SYSTEM      Review of Systems   Constitutional: Negative. HENT: Negative. Eyes: Negative. Respiratory: Negative. Cardiovascular: Negative. Gastrointestinal: Negative. Endocrine: Negative. Genitourinary: Negative. Musculoskeletal:        Shoulder pain right - chronic OA    Right knee pain - chronic OA   Skin: Negative. Intermittent issues with atopic dermatitis    Allergic/Immunologic: Negative. Neurological: Negative. Hematological: Negative. Psychiatric/Behavioral: Negative. All other systems reviewed and are negative. HISTORY OF PRESENT ILLNESS     HPI    Patient is here for Medicare Annual Wellness, medication review and lab review. Please see attached Medicare Annual Wellness questionnaire for additional information for today's visit. Patient has R knee pain for year, with last injection not helping. Patient is having R shoulder pain for some years, injection didn't help as much last time. Patient is asking for referrals to Derm, ortho, and eye doctor.       Healthcare Maintenance  Last mammogram 2021  colonoscopy up to date  immunizations are up to date - patient to send in date for covid booster and influenza vaccine  screening labs are up to date  Medicare Annual wellness completed today 2021    REVIEWED INFORMATION      Allergies   Allergen Reactions    Seasonal     Penicillins Rash       Patient Active Problem List   Diagnosis    Type 2 diabetes mellitus without complication ([facility]    On statin therapy    Hypercholesterolemia    Vitamin D deficiency    Gastroesophageal reflux disease without esophagitis    Hypertension    Rotator cuff tendonitis, right    Osteoarthritis of right knee       Past Medical History:   Diagnosis Date    Diabetes mellitus ([facility]     type 2    Hyperlipidemia        Past Surgical History:   Procedure Laterality Date    APPENDECTOMY  07/07/2016    laparoscopic    BREAST SURGERY      COLONOSCOPY      HYSTERECTOMY          Social History     Socioeconomic History    Marital status:      Spouse name: Not on file    Number of children: Not on file    Years of education: Not on file    Highest education level: Not on file   Occupational History    Not on file   Tobacco Use    Smoking status: Never Smoker    Smokeless tobacco: Never Used   Vaping Use    Vaping Use: Never used   Substance and Sexual Activity    Alcohol use: No    Drug use: Not on file    Sexual activity: Not on file   Other Topics Concern    Not on file   Social History Narrative    Not on file     Social Determinants of Health     Financial Resource Strain: Low Risk     Difficulty of Paying Living Expenses: Not hard at all   Food Insecurity: No Food Insecurity    Worried About Running Out of Food in the Last Year: Never true    Len of Food in the Last Year: Never true   Transportation Needs: No Transportation Needs    Lack of Transportation (Medical): No    Lack of Transportation (Non-Medical):  No   Physical Activity:     Days of Exercise per Week: Not on file    Minutes of Exercise per Session: Not on file   Stress:     Feeling of Stress : Not on file   Social Connections:     Frequency of Communication with Friends and Family: Not on file    Frequency of Social Gatherings with Friends and Family: Not on file    Attends Adventist Services: Not on file    Active Member of Clubs or Organizations: Not on file   [place]olia [place] Attends Club or Organization Meetings: Not on file    Marital Status: Not on file   Intimate Partner Violence:     Fear of Current or Ex-Partner: Not on file    Emotionally Abused: Not on file    Physically Abused: Not on file    Sexually Abused: Not on file   Housing Stability:     Unable to Pay for Housing in the Last Year: Not on file    Number of [place] in the Last Year: Not on file    Unstable Housing in the Last Year: Not on file        Family History   Problem Relation Age of Onset    Asthma Mother     High Blood Pressure Mother     Other Mother     Depression Mother     Heart Disease Father     High Cholesterol Father     High Blood Pressure Father     High Cholesterol Brother        PHYSICAL EXAM     /78   Pulse 76   Ht 5' 5\" (1.651 m)   Wt 165 lb (74.8 kg)   SpO2 97%   BMI 27.46 kg/m²    Physical Exam  Vitals and nursing note reviewed. Constitutional:       Appearance: Normal appearance. She is normal weight. HENT:      Head: Normocephalic and atraumatic. Right Ear: Tympanic membrane, ear canal and external ear normal.      Left Ear: Tympanic membrane, ear canal and external ear normal.      Nose: Nose normal.      Mouth/Throat:      Mouth: Mucous membranes are moist.   Eyes:      Extraocular Movements: Extraocular movements intact. Conjunctiva/sclera: Conjunctivae normal.      Pupils: Pupils are equal, round, and reactive to light. Cardiovascular:      Rate and Rhythm: Normal rate and regular rhythm. Pulses: Normal pulses. Heart sounds: Normal heart sounds. Pulmonary:      Effort: Pulmonary effort is normal.      Breath sounds: Normal breath sounds. Abdominal:      General: Abdomen is flat. Bowel sounds are normal.      Palpations: Abdomen is soft. Musculoskeletal:      Cervical back: Normal range of motion and neck supple.       Comments: Right shoulder with reduced ROM, decreased abduction above 90 degrees, reduced internal rotation, reduced supraspinatus strength    Right knee with hypertrophy, medial TTP, no specific tenderness along the joint line, negative McMurrays   Skin:     General: Skin is warm. Capillary Refill: Capillary refill takes less than 2 seconds. Neurological:      General: No focal deficit present. Mental Status: She is alert and oriented to person, place, and time. Comments: Sensation intact BLE, no deficits   Psychiatric:         Mood and Affect: Mood normal.         Behavior: Behavior normal.         Thought Content: Thought content normal.         Judgment: Judgment normal.         ASSESSMENT/PLAN     1. Screening for heart disease  - EKG 12 lead; Future    2. A1C check    3. Immunizations are up to date      4. Hypertension, unspecified type    5. Type 2 diabetes mellitus without complication, without long-term current use of insulin (HCC)  - Martin Bustamante MD, Ophthalmology, [place]  - Comprehensive Metabolic Panel, Fasting; Future  - Lipid, Fasting; Future  - Hemoglobin A1C; Future  - Microalbumin / Creatinine Urine Ratio; Future  - TSH With Reflex Ft4; Future  - metFORMIN (GLUCOPHAGE) 1000 MG tablet; Take 1 tablet by mouth 2 times daily  Dispense: 180 tablet; Refill: 1  - POCT glycosylated hemoglobin (Hb A1C)    6. Gastroesophageal reflux disease without esophagitis  - CBC With Auto Differential; Future    7. Hypercholesterolemia    8. On statin therapy    9. Vitamin D deficiency  - Vitamin D 25 Hydroxy; Future    10. Osteoarthritis of right knee, unspecified osteoarthritis type  - Salvatore Cotter MD, Orthopedic Surgery, [place]  - XR KNEE RIGHT (3 VIEWS); Future    11. Rotator cuff tendonitis, right  - [facility] Tanisha Beltran MD, Orthopedic Surgery, [place]  - XR SHOULDER RIGHT (MIN 2 VIEWS); Future    12. Right knee pain, unspecified chronicity  - XR KNEE RIGHT (3 VIEWS); Future    13. Right shoulder pain, unspecified chronicity  - XR SHOULDER RIGHT (MIN 2 VIEWS); Future    14.  Atopic

## 2021-12-03 ENCOUNTER — HOSPITAL ENCOUNTER (OUTPATIENT)
Age: 66
Discharge: HOME | End: 2021-12-05
Payer: MEDICARE

## 2021-12-03 ENCOUNTER — HOSPITAL ENCOUNTER (OUTPATIENT)
Dept: GENERAL RADIOLOGY | Age: 66
Discharge: HOME | End: 2021-12-05
Payer: MEDICARE

## 2021-12-03 DIAGNOSIS — M25.511 RIGHT SHOULDER PAIN, UNSPECIFIED CHRONICITY: ICD-10-CM

## 2021-12-03 DIAGNOSIS — M75.81 ROTATOR CUFF TENDONITIS, RIGHT: ICD-10-CM

## 2021-12-03 DIAGNOSIS — M25.561 RIGHT KNEE PAIN, UNSPECIFIED CHRONICITY: ICD-10-CM

## 2021-12-03 DIAGNOSIS — M17.11 OSTEOARTHRITIS OF RIGHT KNEE, UNSPECIFIED OSTEOARTHRITIS TYPE: ICD-10-CM

## 2021-12-03 PROCEDURE — 73562 X-RAY EXAM OF KNEE 3: CPT

## 2021-12-03 PROCEDURE — 73030 X-RAY EXAM OF SHOULDER: CPT

## 2021-12-06 RX ORDER — TRIAMCINOLONE ACETONIDE 1 MG/G
CREAM TOPICAL
Qty: 453 G | Refills: 0 | Status: SHIPPED | OUTPATIENT
Start: 2021-12-06

## 2021-12-09 ENCOUNTER — OFFICE VISIT (OUTPATIENT)
Dept: ORTHOPEDIC SURGERY | Age: 66
End: 2021-12-09
Payer: MEDICARE

## 2021-12-09 DIAGNOSIS — M17.11 OSTEOARTHRITIS OF RIGHT KNEE, UNSPECIFIED OSTEOARTHRITIS TYPE: Primary | ICD-10-CM

## 2021-12-09 PROCEDURE — 1036F TOBACCO NON-USER: CPT | Performed by: ORTHOPAEDIC SURGERY

## 2021-12-09 PROCEDURE — 4040F PNEUMOC VAC/ADMIN/RCVD: CPT | Performed by: ORTHOPAEDIC SURGERY

## 2021-12-09 PROCEDURE — 1123F ACP DISCUSS/DSCN MKR DOCD: CPT | Performed by: ORTHOPAEDIC SURGERY

## 2021-12-09 PROCEDURE — 99203 OFFICE O/P NEW LOW 30 MIN: CPT | Performed by: ORTHOPAEDIC SURGERY

## 2021-12-09 PROCEDURE — 1090F PRES/ABSN URINE INCON ASSESS: CPT | Performed by: ORTHOPAEDIC SURGERY

## 2021-12-09 PROCEDURE — G8417 CALC BMI ABV UP PARAM F/U: HCPCS | Performed by: ORTHOPAEDIC SURGERY

## 2021-12-09 PROCEDURE — 20610 DRAIN/INJ JOINT/BURSA W/O US: CPT | Performed by: ORTHOPAEDIC SURGERY

## 2021-12-09 PROCEDURE — G8428 CUR MEDS NOT DOCUMENT: HCPCS | Performed by: ORTHOPAEDIC SURGERY

## 2021-12-09 PROCEDURE — 3017F COLORECTAL CA SCREEN DOC REV: CPT | Performed by: ORTHOPAEDIC SURGERY

## 2021-12-09 PROCEDURE — G8484 FLU IMMUNIZE NO ADMIN: HCPCS | Performed by: ORTHOPAEDIC SURGERY

## 2021-12-09 PROCEDURE — G8399 PT W/DXA RESULTS DOCUMENT: HCPCS | Performed by: ORTHOPAEDIC SURGERY

## 2021-12-09 RX ORDER — BETAMETHASONE SODIUM PHOSPHATE AND BETAMETHASONE ACETATE 3; 3 MG/ML; MG/ML
12 INJECTION, SUSPENSION INTRA-ARTICULAR; INTRALESIONAL; INTRAMUSCULAR; SOFT TISSUE ONCE
Status: COMPLETED | OUTPATIENT
Start: 2021-12-09 | End: 2021-12-09

## 2021-12-09 RX ORDER — BUPIVACAINE HYDROCHLORIDE 2.5 MG/ML
2 INJECTION, SOLUTION INFILTRATION; PERINEURAL ONCE
Status: COMPLETED | OUTPATIENT
Start: 2021-12-09 | End: 2021-12-09

## 2021-12-09 RX ORDER — LIDOCAINE HYDROCHLORIDE 10 MG/ML
2 INJECTION, SOLUTION INFILTRATION; PERINEURAL ONCE
Status: COMPLETED | OUTPATIENT
Start: 2021-12-09 | End: 2021-12-09

## 2021-12-09 RX ADMIN — LIDOCAINE HYDROCHLORIDE 2 ML: 10 INJECTION, SOLUTION INFILTRATION; PERINEURAL at 16:06

## 2021-12-09 RX ADMIN — BETAMETHASONE SODIUM PHOSPHATE AND BETAMETHASONE ACETATE 12 MG: 3; 3 INJECTION, SUSPENSION INTRA-ARTICULAR; INTRALESIONAL; INTRAMUSCULAR; SOFT TISSUE at 16:08

## 2021-12-09 RX ADMIN — BUPIVACAINE HYDROCHLORIDE 5 MG: 2.5 INJECTION, SOLUTION INFILTRATION; PERINEURAL at 16:08

## 2021-12-09 NOTE — PROGRESS NOTES
Eliza Ellis M.D.            [address], [address] [facility]           Dept Phone: [phone]           Dept Fax:  [address]           Jorge [place]          Dept Phone: [phone]           Dept Fax:  [phone]      Chief Compliant:  Chief Complaint   Patient presents with    Pain     Rt knee        History of Present Illness: This is a 72 y.o. female who presents to the clinic today for evaluation / follow up of right knee pain. Sergey Luciano is a 66-year-old female who is been having knee pain on and off for several years. She has had injections before in the past and she thinks she had it drained once many years ago she is presently not taking any anti-inflammatories other than occasional Motrin. She denies any specific injury or trauma. She says her knee sometimes feels swollen and achy for her. She does have a harder time walking and taking care of her grandkids. .       Review of Systems   Constitutional: Negative for fever, chills, sweats. Eyes: Negative for changes in vision, or pain. HENT: Negative for ear ache, epistaxis, or sore throat. Respiratory/Cardio: Negative for Chest pain, palpitations, SOB, or cough. Gastrointestinal: Negative for abdominal pain, N/V/D. Genitourinary: Negative for dysuria, frequency, urgency, or hematuria. Neurological: Negative for headache, numbness, or weakness. Integumentary: Negative for rash, itching, laceration, or abrasion. Musculoskeletal: Positive for Pain (Rt knee)       Physical Exam:  Constitutional: Patient is oriented to person, place, and time. Patient appears well-developed and well nourished. HENT: Negative otherwise noted  Head: Normocephalic and Atraumatic  Nose: Normal  Eyes: Conjunctivae and EOM are normal  Neck: Normal range of motion Neck supple. Respiratory/Cardio: Effort normal. No respiratory distress. Musculoskeletal: Examination patient's right knee notes that she has a modest effusion. Motion is 0 to 135 degrees Courtney's negative Lachman's negative collaterals are good at 0 69 degrees some mild/moderate patellofemoral pain with compression. No apprehension. No pain on hip rotation. No trochanteric findings. Neurological: Patient is alert and oriented to person, place, and time. Normal strenght. No sensory deficit. Skin: Skin is warm and dry  Psychiatric: Behavior is normal. Thought content normal.  Nursing note and vitals reviewed. Labs and Imaging:     XR taken earlier this month per Dr. Lee Ann Leroy show AP lateral views the patient's right knee as well as her left knee. She has moderate medial joint space narrowing of the medial compartment with some early spur formation. She has a mild varus disposition. A small effusion can be seen. Lateral view show some patellofemoral narrowing as well. Left knee is not nearly as involved. Orders Placed This Encounter   Procedures    CT ARTHROCENTESIS ASPIR&/INJ MAJOR JT/BURSA W/O US       Assessment and Plan:  1. Osteoarthritis of right knee, unspecified osteoarthritis type            This is a 72 y.o. female who presents to the clinic today for evaluation / follow up of moderate osteoarthritis right knee. Past History:    Current Outpatient Medications:     triamcinolone (KENALOG) 0.1 % cream, Apply topically 2 times daily. , Disp: 453 g, Rfl: 0    gabapentin (NEURONTIN) 600 MG tablet, Take 2 tablets by mouth nightly for 90 days. , Disp: 180 tablet, Rfl: 1    ibuprofen (ADVIL;MOTRIN) 800 MG tablet, Take 1 tablet by mouth 2 times daily as needed for Pain, Disp: 60 tablet, Rfl: 1    losartan (COZAAR) 25 MG tablet, Take 1 tablet by mouth daily, Disp: 90 tablet, Rfl: 1    metFORMIN (GLUCOPHAGE) 1000 MG tablet, Take 1 tablet by mouth 2 times daily, Disp: 180 tablet, Rfl: 1   rosuvastatin (CRESTOR) 10 MG tablet, rosuvastatin 10 mg tablet, Disp: 90 tablet, Rfl: 1    mirtazapine (REMERON) 30 MG tablet, Take 1 tablet by mouth nightly, Disp: 90 tablet, Rfl: 1    montelukast (SINGULAIR) 10 MG tablet, Take 1 tablet by mouth nightly, Disp: 90 tablet, Rfl: 1    omeprazole (PRILOSEC) 40 MG delayed release capsule, Take 1 capsule by mouth every morning (before breakfast) 1hr. Before eat breakfast, Disp: 90 capsule, Rfl: 1    fluticasone-salmeterol (ADVAIR DISKUS) 100-50 MCG/DOSE diskus inhaler, Inhale 1 puff into the lungs 2 times daily, Disp: 180 each, Rfl: 1    escitalopram (LEXAPRO) 5 MG tablet, Take 1 tablet by mouth nightly, Disp: 90 tablet, Rfl: 1    fluticasone (FLONASE) 50 MCG/ACT nasal spray, 2 sprays by Nasal route daily, Disp: 3 each, Rfl: 1    Continuous Blood Gluc Sensor (FREESTYLE JUANI 2 SENSOR) [facility], As directed, change every 14 days, Disp: , Rfl:     acyclovir (ZOVIRAX) 400 MG tablet, Take 1 tablet by mouth 2 times daily, Disp: 180 tablet, Rfl: 0    BD PEN NEEDLE WIN 2ND GEN 32G X 4 MM MISC, , Disp: , Rfl:     BASAGLAR KWIKPEN 100 UNIT/ML injection pen, , Disp: , Rfl:     NOVOLOG FLEXPEN 100 UNIT/ML injection pen, , Disp: , Rfl:     aspirin 81 MG chewable tablet, Take 81 mg by mouth daily, Disp: , Rfl:     vitamin D 25 MCG (1000 UT) CAPS, Take by mouth, Disp: , Rfl:     acetaminophen (TYLENOL) 500 MG tablet, Take 500 mg by mouth every 6 hours as needed for Pain, Disp: , Rfl:     GLUCOSAMINE HCL PO, Take by mouth, Disp: , Rfl:   Allergies   Allergen Reactions    Seasonal     Penicillins Rash     Social History     Socioeconomic History    Marital status:      Spouse name: Not on file    Number of children: Not on file    Years of education: Not on file    Highest education level: Not on file   Occupational History    Not on file   Tobacco Use    Smoking status: Never Smoker    Smokeless tobacco: Never Used   Vaping Use    Vaping Use: Never used Substance and Sexual Activity    Alcohol use: No    Drug use: Not on file    Sexual activity: Not on file   Other Topics Concern    Not on file   Social History Narrative    Not on file     Social Determinants of Health     Financial Resource Strain: Low Risk     Difficulty of Paying Living Expenses: Not hard at all   Food Insecurity: No Food Insecurity    Worried About Running Out of Food in the Last Year: Never true    Len of Food in the Last Year: Never true   Transportation Needs: No Transportation Needs    Lack of Transportation (Medical): No    Lack of Transportation (Non-Medical):  No   Physical Activity:     Days of Exercise per Week: Not on file    Minutes of Exercise per Session: Not on file   Stress:     Feeling of Stress : Not on file   Social Connections:     Frequency of Communication with Friends and Family: Not on file    Frequency of Social Gatherings with Friends and Family: Not on file    Attends Gnosticism Services: Not on file    Active Member of [address] [place] [address] or Organizations: Not on file    Attends Club or Organization Meetings: Not on file    Marital Status: Not on file   Intimate Partner Violence:     Fear of Current or Ex-Partner: Not on file    Emotionally Abused: Not on file    Physically Abused: Not on file    Sexually Abused: Not on file   Housing Stability:     Unable to Pay for Housing in the Last Year: Not on file    Number of [place] in the Last Year: Not on file    Unstable Housing in the Last Year: Not on file     Past Medical History:   Diagnosis Date    Diabetes mellitus ([facility] [address]     type 2    Hyperlipidemia      Past Surgical History:   Procedure Laterality Date    APPENDECTOMY  07/07/2016    laparoscopic    BREAST SURGERY      COLONOSCOPY      HYSTERECTOMY       Family History   Problem Relation Age of Onset    Asthma Mother     High Blood Pressure Mother     Other Mother     Depression Mother     Heart Disease Father     High Cholesterol

## 2021-12-22 ENCOUNTER — OFFICE VISIT (OUTPATIENT)
Dept: ORTHOPEDIC SURGERY | Age: 66
End: 2021-12-22
Payer: MEDICARE

## 2021-12-22 VITALS — BODY MASS INDEX: 27.49 KG/M2 | HEIGHT: 65 IN | WEIGHT: 165 LBS

## 2021-12-22 DIAGNOSIS — M25.511 RIGHT SHOULDER PAIN, UNSPECIFIED CHRONICITY: Primary | ICD-10-CM

## 2021-12-22 PROCEDURE — 99213 OFFICE O/P EST LOW 20 MIN: CPT | Performed by: ORTHOPAEDIC SURGERY

## 2021-12-22 PROCEDURE — 1090F PRES/ABSN URINE INCON ASSESS: CPT | Performed by: ORTHOPAEDIC SURGERY

## 2021-12-22 PROCEDURE — 1036F TOBACCO NON-USER: CPT | Performed by: ORTHOPAEDIC SURGERY

## 2021-12-22 PROCEDURE — G8427 DOCREV CUR MEDS BY ELIG CLIN: HCPCS | Performed by: ORTHOPAEDIC SURGERY

## 2021-12-22 PROCEDURE — 3017F COLORECTAL CA SCREEN DOC REV: CPT | Performed by: ORTHOPAEDIC SURGERY

## 2021-12-22 PROCEDURE — 1123F ACP DISCUSS/DSCN MKR DOCD: CPT | Performed by: ORTHOPAEDIC SURGERY

## 2021-12-22 PROCEDURE — 4040F PNEUMOC VAC/ADMIN/RCVD: CPT | Performed by: ORTHOPAEDIC SURGERY

## 2021-12-22 PROCEDURE — G8417 CALC BMI ABV UP PARAM F/U: HCPCS | Performed by: ORTHOPAEDIC SURGERY

## 2021-12-22 PROCEDURE — G8484 FLU IMMUNIZE NO ADMIN: HCPCS | Performed by: ORTHOPAEDIC SURGERY

## 2021-12-22 PROCEDURE — G8399 PT W/DXA RESULTS DOCUMENT: HCPCS | Performed by: ORTHOPAEDIC SURGERY

## 2021-12-22 NOTE — PROGRESS NOTES
Orthopedic Shoulder Encounter Note     Chief complaint: right shoulder pain    HPI: Lelo Aburto is a 72 y.o.  right-hand dominant female who presents for evaluation of her right shoulder. She indicates that she has been having pain for 5 to 10 years. She believes she hurt the shoulder years ago on a tractor doing leaves. She states that she reached backwards to drag along garbage bag and felt a sharp pain in her shoulder and is been hurting ever since. Her pain is primarily localized to the lateral aspect of her shoulder and she cannot sleep. Its progressively getting worse. It hurts to carry anything and at times feels a catching sensation in the shoulder with elevation. She denies any weakness or stiffness at this time. She has been treated with use of prescription strength anti-inflammatories as well as 2 cortisone injections the last of which was ineffective. Previous treatment:    NSAIDs: Ibuprofen, Aleve    Physical Therapy: No    Injections: Cortisone injection in May and September 2021 administered by Dr. Winkler Plants    Surgeries: None    Review of Systems:   Constitutional: Negative for fever, chills, sweats. Pain Score:   8  Neurological: Negative for headache, numbness, or weakness. Musculoskeletal: As noted in HPI     Past Medical History  Radha  has a past medical history of Diabetes mellitus ([facility] [address] and Hyperlipidemia. Past Surgical History  Radha  has a past surgical history that includes Hysterectomy; Breast surgery; Colonoscopy; and Appendectomy (07/07/2016). Current Medications  Current Outpatient Medications   Medication Sig Dispense Refill    triamcinolone (KENALOG) 0.1 % cream Apply topically 2 times daily. 453 g 0    gabapentin (NEURONTIN) 600 MG tablet Take 2 tablets by mouth nightly for 90 days.  180 tablet 1    ibuprofen (ADVIL;MOTRIN) 800 MG tablet Take 1 tablet by mouth 2 times daily as needed for Pain 60 tablet 1    losartan (COZAAR) 25 MG tablet Take 1 tablet by mouth daily 90 tablet 1    metFORMIN (GLUCOPHAGE) 1000 MG tablet Take 1 tablet by mouth 2 times daily 180 tablet 1    rosuvastatin (CRESTOR) 10 MG tablet rosuvastatin 10 mg tablet 90 tablet 1    mirtazapine (REMERON) 30 MG tablet Take 1 tablet by mouth nightly 90 tablet 1    montelukast (SINGULAIR) 10 MG tablet Take 1 tablet by mouth nightly 90 tablet 1    omeprazole (PRILOSEC) 40 MG delayed release capsule Take 1 capsule by mouth every morning (before breakfast) 1hr. Before eat breakfast 90 capsule 1    fluticasone-salmeterol (ADVAIR DISKUS) 100-50 MCG/DOSE diskus inhaler Inhale 1 puff into the lungs 2 times daily 180 each 1    escitalopram (LEXAPRO) 5 MG tablet Take 1 tablet by mouth nightly 90 tablet 1    fluticasone (FLONASE) 50 MCG/ACT nasal spray 2 sprays by Nasal route daily 3 each 1    Continuous Blood Gluc Sensor (FREESTYLE JUANI 2 SENSOR) [facility] As directed, change every 14 days      acyclovir (ZOVIRAX) 400 MG tablet Take 1 tablet by mouth 2 times daily 180 tablet 0    BD PEN NEEDLE WIN 2ND GEN 32G X 4 MM MISC       BASAGLAR KWIKPEN 100 UNIT/ML injection pen       NOVOLOG FLEXPEN 100 UNIT/ML injection pen       aspirin 81 MG chewable tablet Take 81 mg by mouth daily      vitamin D 25 MCG (1000 UT) CAPS Take by mouth      acetaminophen (TYLENOL) 500 MG tablet Take 500 mg by mouth every 6 hours as needed for Pain      GLUCOSAMINE HCL PO Take by mouth       No current facility-administered medications for this visit. Allergies  Allergies have been reviewed. [facility] is allergic to seasonal and penicillins. Social History  [facility]  reports that she has never smoked. She has never used smokeless tobacco. She reports that she does not drink alcohol. Family History  Marianna's family history includes Asthma in her mother; Depression in her mother; Heart Disease in her father; High Blood Pressure in her father and mother; High Cholesterol in her brother and father; Other in her mother. Physical Exam:     Ht 5' 5\" (1.651 m)   Wt 165 lb (74.8 kg)   BMI 27.46 kg/m²    Constitutional: Patient is oriented to person, place, and time. Patient appears well-developed and well nourished. Mental Status/psychiatric: Behavior is normal. Thought content normal.  HENT: Negative otherwise noted  Head: Normocephalic and Atraumatic  Nose: Normal  Respiratory/Cardio: Effort normal. No respiratory distress. Gait: normal    Shoulder:    Skin: Skin is warm and dry; no swelling or obvious muscular atrophy.    Vasculature: 2+ radial pulses bilaterally  Neuro: Sensation grossly intact to light touch diffusely  Tenderness: None    ROM: (Degrees)    Right   A P   Left   A P    Elevation  155    Elevation  155   Abduction  160    Abduction  160    ER   55    ER   70   IR   T10    IR   T10   90 abd/ER      90 abd/ER     90 abd/IR      90 abd/IR     Crepitation  No    Crepitation No  Dyskenesia  No    Dyskenesia No      Muscle strength:    Right       Left    Deltoid   5    Deltoid   5  Supraspinatus  4    Supraspinatus  5  ER   3    ER   5  IR   5    IR   5    Special tests    Right   Rotator Cuff    Left    y   Painful arc    n   n   Pain with ER    n    n   Neer's     n    n   Hawkin's    n    n   Drop Arm    n  n   Lift off/Belly Press   n  n   ER Lag    n          AC Joint  n   AC tenderness   n  n   Cross-chest adduction  n       Labrum/biceps    y   [place]'s    n   n   Biceps sheer    n      n   Speed's/Yergason's   n    n   Tenderness Biceps Groove  n    n   Davion's    n         Instability  n   Ant Apprehension   n    n   Post Apprehension   n    n   Ant Load shift    n    n   Post Load shift   n   n   Sulcus     n  n   Generalized Laxity   n  n   Relocation test   n  n   Crank test     n  n   Ronaldo-superior escape  n       Imaging:  Xrays: 3 views of the right shoulder obtained on 12/22/2021 were independently reviewed  Indications: Right shoulder pain  Findings: Normal glenohumeral and acromioclavicular joint spaces. No obvious fracture, dislocation, or subluxation. Impression: Normal-appearing right shoulder radiographs      Impression/Plan:     Cammy Pang is a 72 y.o. old female with right shoulder pain and clinical weakness that is concerning for the presence of a full-thickness rotator cuff tear. She is undergone treatment thus far with prescription NSAIDs as well as cortisone injections without relief. Consequently I did recommend further evaluation at this time with an MRI study of her shoulder. We will facilitate her getting the study completed and follow-up afterwards to review and discuss results and treatment recommendations at that time as indicated. This note is created with the assistance of a speech recognition program.  While intending to generate adocument that actually reflects the content of the visit, the document can still have some errors including those of syntax and sound a like substitutions which may escape proof reading. It such instances, actual meaningcan be extrapolated by contextual diversion.     NA = Not assessed  RTC = Rotator cuff  RCT = Rotator cuff tear  ER = External rotation  IR = Internal rotation  AC = Acromioclavicular  GH = Glenohumeral  n = No  y = Yes

## 2021-12-29 PROBLEM — Z00.00 MEDICARE ANNUAL WELLNESS VISIT, SUBSEQUENT: Status: RESOLVED | Noted: 2021-11-29 | Resolved: 2021-12-29

## 2021-12-29 PROBLEM — Z12.31 ENCOUNTER FOR SCREENING MAMMOGRAM FOR MALIGNANT NEOPLASM OF BREAST: Status: RESOLVED | Noted: 2021-04-11 | Resolved: 2021-12-29

## 2021-12-31 ENCOUNTER — HOSPITAL ENCOUNTER (OUTPATIENT)
Dept: MRI IMAGING | Age: 66
Discharge: HOME | End: 2022-01-02
Payer: MEDICARE

## 2021-12-31 DIAGNOSIS — M25.511 RIGHT SHOULDER PAIN, UNSPECIFIED CHRONICITY: ICD-10-CM

## 2021-12-31 PROCEDURE — 73221 MRI JOINT UPR EXTREM W/O DYE: CPT

## 2022-01-07 ENCOUNTER — OFFICE VISIT (OUTPATIENT)
Dept: ORTHOPEDIC SURGERY | Age: 67
End: 2022-01-07
Payer: MEDICARE

## 2022-01-07 VITALS — WEIGHT: 165 LBS | HEIGHT: 65 IN | BODY MASS INDEX: 27.49 KG/M2

## 2022-01-07 DIAGNOSIS — M25.511 RIGHT SHOULDER PAIN, UNSPECIFIED CHRONICITY: ICD-10-CM

## 2022-01-07 DIAGNOSIS — Z01.818 PREOPERATIVE TESTING: ICD-10-CM

## 2022-01-07 DIAGNOSIS — M75.121 COMPLETE TEAR OF RIGHT ROTATOR CUFF, UNSPECIFIED WHETHER TRAUMATIC: Primary | ICD-10-CM

## 2022-01-07 DIAGNOSIS — S43.003A SUBLUXATION OF TENDON OF LONG HEAD OF BICEPS: ICD-10-CM

## 2022-01-07 PROCEDURE — 1036F TOBACCO NON-USER: CPT | Performed by: ORTHOPAEDIC SURGERY

## 2022-01-07 PROCEDURE — 1090F PRES/ABSN URINE INCON ASSESS: CPT | Performed by: ORTHOPAEDIC SURGERY

## 2022-01-07 PROCEDURE — 3017F COLORECTAL CA SCREEN DOC REV: CPT | Performed by: ORTHOPAEDIC SURGERY

## 2022-01-07 PROCEDURE — G8399 PT W/DXA RESULTS DOCUMENT: HCPCS | Performed by: ORTHOPAEDIC SURGERY

## 2022-01-07 PROCEDURE — G8427 DOCREV CUR MEDS BY ELIG CLIN: HCPCS | Performed by: ORTHOPAEDIC SURGERY

## 2022-01-07 PROCEDURE — G8417 CALC BMI ABV UP PARAM F/U: HCPCS | Performed by: ORTHOPAEDIC SURGERY

## 2022-01-07 PROCEDURE — 99213 OFFICE O/P EST LOW 20 MIN: CPT | Performed by: ORTHOPAEDIC SURGERY

## 2022-01-07 PROCEDURE — G8484 FLU IMMUNIZE NO ADMIN: HCPCS | Performed by: ORTHOPAEDIC SURGERY

## 2022-01-07 PROCEDURE — 4040F PNEUMOC VAC/ADMIN/RCVD: CPT | Performed by: ORTHOPAEDIC SURGERY

## 2022-01-07 PROCEDURE — 1123F ACP DISCUSS/DSCN MKR DOCD: CPT | Performed by: ORTHOPAEDIC SURGERY

## 2022-01-07 NOTE — PROGRESS NOTES
HPI: Ms. Carline Lesch is here today to review the results of her right shoulder MRI study which was completed on 12/31/2021. I did review the images with the patient and her  today and it demonstrates what appears to be a full-thickness tear involving the infraspinatus and portions of the supraspinatus with retraction to the middle of the humeral head. The biceps tendon also appears to be medially subluxed but the subscapularis does appear to be intact. I had a discussion with the patient and her  today educating her about the condition of her shoulder and treatment options available to her including continued conservative management as well as surgical intervention. She is undergone treatment thus far with use of anti-inflammatories and at least 2 cortisone injections to her shoulder. We discussed the possibility of physical therapy being an option. At this time she is electing to forego continued conservative treatment and proceed with surgical intervention by way of an arthroscopic rotator cuff repair and possible biceps tenodesis. We discussed in detail what surgery would entail in terms of the actual procedure, risks and benefits of surgery, expected outcome, and postoperative recovery course. Risks as discussed included but were not limited to risk of infection, wound healing problems, stiffness, progressive arthritis, persistent pain, re-tear of the rotator cuff, failure of the rotator cuff repair to heal, suture and/or anchor related problems, vascular injury, excessive bleeding, temporary and/or permanent nerve injury, medical risks including DVT, PE, and stroke, and risk of anesthesia. She demonstrated a good understanding of our discussion and at this time would like to proceed with surgical intervention as outlined above. We will facilitate her getting cleared for surgery preoperatively and we'll schedule her for surgery at her convenience.   All questions were appropriately answered however she was encouraged to return or call prior to surgery or any other upcoming appointments with additional questions and/or concerns. I have spent 25 minutes face-to-face with the patient more than 50% of this time was spent counseling and coordinating care as outlined above.

## 2022-01-24 DIAGNOSIS — E11.9 TYPE 2 DIABETES MELLITUS WITHOUT COMPLICATION, WITHOUT LONG-TERM CURRENT USE OF INSULIN (HCC): ICD-10-CM

## 2022-01-24 RX ORDER — LOSARTAN POTASSIUM 25 MG/1
25 TABLET ORAL DAILY
Qty: 90 TABLET | Refills: 1 | Status: SHIPPED | OUTPATIENT
Start: 2022-01-24 | End: 2022-06-06 | Stop reason: SDUPTHER

## 2022-01-24 RX ORDER — MIRTAZAPINE 30 MG/1
30 TABLET, FILM COATED ORAL NIGHTLY
Qty: 90 TABLET | Refills: 1 | Status: SHIPPED | OUTPATIENT
Start: 2022-01-24 | End: 2022-06-06 | Stop reason: SDUPTHER

## 2022-02-08 ENCOUNTER — HOSPITAL ENCOUNTER (OUTPATIENT)
Age: 67
Setting detail: SPECIMEN
Discharge: HOME | End: 2022-02-08

## 2022-02-08 ENCOUNTER — HOSPITAL ENCOUNTER (OUTPATIENT)
Dept: PREADMISSION TESTING | Age: 67
Discharge: HOME | End: 2022-02-12

## 2022-02-08 VITALS
HEART RATE: 76 BPM | TEMPERATURE: 97 F | DIASTOLIC BLOOD PRESSURE: 83 MMHG | RESPIRATION RATE: 16 BRPM | SYSTOLIC BLOOD PRESSURE: 138 MMHG | WEIGHT: 169 LBS | OXYGEN SATURATION: 99 % | BODY MASS INDEX: 28.12 KG/M2

## 2022-02-08 LAB
ANION GAP SERPL CALCULATED.3IONS-SCNC: 12 MMOL/L (ref 9–17)
BUN BLDV-MCNC: 19 MG/DL (ref 8–23)
BUN/CREAT BLD: ABNORMAL (ref 9–20)
CALCIUM SERPL-MCNC: 9.8 MG/DL (ref 8.6–10.4)
CHLORIDE BLD-SCNC: 104 MMOL/L (ref 98–107)
CO2: 23 MMOL/L (ref 20–31)
CREAT SERPL-MCNC: 0.73 MG/DL (ref 0.5–0.9)
GFR AFRICAN AMERICAN: >60 ML/MIN
GFR NON-AFRICAN AMERICAN: >60 ML/MIN
GFR SERPL CREATININE-BSD FRML MDRD: ABNORMAL ML/MIN/{1.73_M2}
GFR SERPL CREATININE-BSD FRML MDRD: ABNORMAL ML/MIN/{1.73_M2}
GLUCOSE BLD-MCNC: 206 MG/DL (ref 70–99)
HCT VFR BLD CALC: 41.7 % (ref 36.3–47.1)
HEMOGLOBIN: 13.3 G/DL (ref 11.9–15.1)
MCH RBC QN AUTO: 28.7 PG (ref 25.2–33.5)
MCHC RBC AUTO-ENTMCNC: 31.9 G/DL (ref 28.4–34.8)
MCV RBC AUTO: 89.9 FL (ref 82.6–102.9)
NRBC AUTOMATED: 0 PER 100 WBC
PDW BLD-RTO: 13.7 % (ref 11.8–14.4)
PLATELET # BLD: 239 K/UL (ref 138–453)
PMV BLD AUTO: 10.8 FL (ref 8.1–13.5)
POTASSIUM SERPL-SCNC: 4.5 MMOL/L (ref 3.7–5.3)
RBC # BLD: 4.64 M/UL (ref 3.95–5.11)
SODIUM BLD-SCNC: 139 MMOL/L (ref 135–144)
WBC # BLD: 5.7 K/UL (ref 3.5–11.3)

## 2022-02-08 ASSESSMENT — PAIN DESCRIPTION - ONSET: ONSET: AWAKENED FROM SLEEP

## 2022-02-08 ASSESSMENT — PAIN DESCRIPTION - FREQUENCY: FREQUENCY: CONTINUOUS

## 2022-02-08 ASSESSMENT — PAIN DESCRIPTION - PAIN TYPE: TYPE: CHRONIC PAIN

## 2022-02-08 ASSESSMENT — PAIN DESCRIPTION - LOCATION: LOCATION: SHOULDER

## 2022-02-08 ASSESSMENT — PAIN DESCRIPTION - ORIENTATION: ORIENTATION: RIGHT

## 2022-02-08 ASSESSMENT — PAIN DESCRIPTION - DESCRIPTORS: DESCRIPTORS: ACHING

## 2022-02-08 ASSESSMENT — PAIN DESCRIPTION - PROGRESSION: CLINICAL_PROGRESSION: GRADUALLY WORSENING

## 2022-02-08 ASSESSMENT — PAIN - FUNCTIONAL ASSESSMENT: PAIN_FUNCTIONAL_ASSESSMENT: PREVENTS OR INTERFERES WITH MANY ACTIVE NOT PASSIVE ACTIVITIES

## 2022-02-08 ASSESSMENT — PAIN SCALES - GENERAL: PAINLEVEL_OUTOF10: 8

## 2022-02-09 ENCOUNTER — OFFICE VISIT (OUTPATIENT)
Dept: DERMATOLOGY | Age: 67
End: 2022-02-09
Payer: MEDICARE

## 2022-02-09 VITALS
HEIGHT: 65 IN | SYSTOLIC BLOOD PRESSURE: 132 MMHG | BODY MASS INDEX: 28.19 KG/M2 | DIASTOLIC BLOOD PRESSURE: 81 MMHG | TEMPERATURE: 97.5 F | WEIGHT: 169.2 LBS | HEART RATE: 87 BPM | OXYGEN SATURATION: 98 %

## 2022-02-09 DIAGNOSIS — D22.9 MULTIPLE NEVI: ICD-10-CM

## 2022-02-09 DIAGNOSIS — L57.0 ACTINIC KERATOSES: ICD-10-CM

## 2022-02-09 DIAGNOSIS — Z80.8 FAMILY HISTORY OF MELANOMA: ICD-10-CM

## 2022-02-09 DIAGNOSIS — L82.1 SEBORRHEIC KERATOSES: Primary | ICD-10-CM

## 2022-02-09 DIAGNOSIS — L20.9 ATOPIC DERMATITIS, UNSPECIFIED TYPE: ICD-10-CM

## 2022-02-09 PROCEDURE — 4040F PNEUMOC VAC/ADMIN/RCVD: CPT | Performed by: DERMATOLOGY

## 2022-02-09 PROCEDURE — G8399 PT W/DXA RESULTS DOCUMENT: HCPCS | Performed by: DERMATOLOGY

## 2022-02-09 PROCEDURE — 1036F TOBACCO NON-USER: CPT | Performed by: DERMATOLOGY

## 2022-02-09 PROCEDURE — 1123F ACP DISCUSS/DSCN MKR DOCD: CPT | Performed by: DERMATOLOGY

## 2022-02-09 PROCEDURE — G8484 FLU IMMUNIZE NO ADMIN: HCPCS | Performed by: DERMATOLOGY

## 2022-02-09 PROCEDURE — G8427 DOCREV CUR MEDS BY ELIG CLIN: HCPCS | Performed by: DERMATOLOGY

## 2022-02-09 PROCEDURE — 1090F PRES/ABSN URINE INCON ASSESS: CPT | Performed by: DERMATOLOGY

## 2022-02-09 PROCEDURE — 99203 OFFICE O/P NEW LOW 30 MIN: CPT | Performed by: DERMATOLOGY

## 2022-02-09 PROCEDURE — 17000 DESTRUCT PREMALG LESION: CPT | Performed by: DERMATOLOGY

## 2022-02-09 PROCEDURE — G8417 CALC BMI ABV UP PARAM F/U: HCPCS | Performed by: DERMATOLOGY

## 2022-02-09 PROCEDURE — 3017F COLORECTAL CA SCREEN DOC REV: CPT | Performed by: DERMATOLOGY

## 2022-02-09 RX ORDER — FLASH GLUCOSE SENSOR
KIT MISCELLANEOUS
COMMUNITY
Start: 2021-06-14

## 2022-02-09 RX ORDER — INSULIN GLARGINE 100 [IU]/ML
INJECTION, SOLUTION SUBCUTANEOUS
COMMUNITY
End: 2022-02-09

## 2022-02-09 NOTE — PATIENT INSTRUCTIONS
For Atopic Dermatitis  - continue triamcinolone    Cryotherapy    Liquid Nitrogen - \"freeze\" (Cryotherapy)  Your doctor has treated your skin lesions with a very cold substance. The liquid nitrogen is so cold that it may feel like the skin is burning during application. A clear blister or blood blister may form after treatment and may later form a scab. Leave the area alone. Usually this scab will fall of within 1-2 weeks. The area should be kept clean and can be covered with Vaseline and a Band-Aid if needed. If a large blister develops it is ok to use a clean needle to gently pop the blister. Please call our office with any concerns at [phone]. Seborrheic Keratosis  Seborrheic keratoses are common benign growths of unknown cause seen in adults due to a thickening of an area of the top skin layer. Who's At Risk  Although they can occur anytime after puberty, almost everyone over 48 has one or more of these and they increase in number with age. Some families have an inherited tendency to grow multiple lesions. Men and women are equally as likely to develop seborrheic keratoses. Dark-skinned people are less affected than those with light skin; a variant seen in blacks is called dermatosis papulosa nigra. Signs & Symptoms  One or more spots can occur anywhere on the body, except for palms, soles, and mucous membranes (eg, in the mouth or rectum). They do not go away. They do not turn into cancers, but some cancers resemble seborrheic keratosis. They start as light brown to skin-colored, flat areas, which are round to oval and of varying size (usually less than a half inch, but sometimes much larger). As they grow thicker and rise above the skin surface, seborrheic keratoses may become dark brown to almost black with a \"stuck on\" appearance. The surface may feel smooth or rough. Self-Care Guidelines  No treatment is needed unless there is irritation from clothing with itching or bleeding.   There is no way to prevent new spots from forming. Some lotions with alpha hydroxyl acids may make the areas feel smoother with regular use but will not eliminate them. OTC freezing techniques are available but usually not effective. When to [facility]  If a spot on the skin is growing, bleeding, painful, or itchy, or any other concerning changes, then see your doctor. Actinic Keratosis (AKs)   Actinic Keratosis are skin lesions caused by long-term exposure to the sun. They are scaly, rough, to the touch, irregularly shaped, and skin-colored, reddish brown, or yellowish in color. Recent Studies suggest that some AK lesions actually may be a very early form of a type of skin cancer, squamous cell carcinoma (SCC), that has not spread beyond a small, confined area. It is not yet possible to tell which AK lesions will go on to become skin cancer. Experts from the [facility], the [address] [place] [address], and the [employer] of Dermatology have recommended that all patients with AK lesions be evaluated and undergo some form of treatment. Your dermatologist can determine which type of treatment-either alone or in combination-is right for you. SUN PROTECTION AND OBSERVATION  Your dermatologist may recommend that you use a sun block, wear a hat and clothing to prevent sun exposure, and have regular skin examinations. Some AKs go away without further treatment, provided that the skin is not subjected to more sun damage. However, regular examinations will help catch the lesions that need to be treated. LESION-TARGETED THERAPIES   Liquid nitrogen (cryotherapy) destroys AKs by freezing them. This results in blistering and shedding of the AKs. Cryotherapy is the most common treatment when a patient has a few, small AK lesions. Topical chemotherapy is a cream that targets sun-damaged and pre-cancerous cells and destroys them. Moles    Moles, or nevi, are very common.  Moles are areas of the skin where there are more cells called melanocytes. Melanocytes are the cells in the body that produce pigment, or color. Moles can be many colors including skin-tone, pink, tan, brown, and very dark brown to black. Moles can be raised or flat. Moles can have hair. Moles can grow on any skin surface, including the scalp, hands and feet. When someone is born with a mole, or develops one in the first months of life, the mole is called a congenital, or birthmark mole. About 1 in 100 people are born with one or more moles. Most people develop their moles later in childhood or adulthood. These are called acquired moles. They are most common on sun exposed areas of skin such as the face, neck, upper body, arms and legs. CHECKING MOLES  Most moles are harmless, but in rare cases moles may become cancerous. Checking moles and looking for changes is an important step in helping to catch worrisome changes early. Some changes to look for are asymmetry (moles that do not look the same on each half), irregular shapes or borders, uneven color or large size. Also look for any moles that bleed, itch, or become painful. Looking at your skin regularly can help you recognize moles that are more at risk for becoming cancerous. WHEN TO CALL THE DOCTOR  Call your doctor if you see any of the following changes in a mole:       Irregular borders (uneven shape or edges)       Changes in color to black, blue or red.      Changes in the surface texture       Scabs, scaling, irritation or bleeding in the mole    TREATMENT FOR MOLES  Often we can simply look at your moles and tell you if they look worrisome. If we are not concerned about the look of your moles at your appointment, we may measure some moles and take some photos that will allow us to watch for future changes in the moles.      TREATMENT FOR MOLES  If a mole is getting irritated frequently, bleeding, difficult to watch due to location or dark color, atypical in appearance, or worrisome, we may perform a skin biopsy. A skin biopsy is a procedure that involves removing the mole so that it can be looked at under a microscope. There are many methods used to remove moles. The method we choose depends on the location of the mole, the size of the mole, and the amount of concern for skin cancer. Generally, removing moles in the dermatologists office is a simple and safe procedure that can be done with local anesthesia. PREVENTION  You can do some things to prevent moles from becoming cancerous:       Try to avoid long periods of time in the sun and severe sunburns. The sun is        especially dangerous between 10:00 am and 4:00 pm.       Use a broad spectrum, water-resistant sun block lotion with an SPF of 30 or        greater. A broad spectrum lotion blocks both UVA and UVB rays from the sun. Re-apply sunscreen at least every 2 hours and after swimming or sweating.      Take advantage of shade whenever possible. Wear a broad-brimmed hat,        sunglasses, and protective clothing when outdoors.      Do not use tanning beds. Sun Protection     There are two types of sun rays that are harmful to the skin. UVA rays cause skin aging and skin cancer, such as melanoma. UVB rays cause sunburns, cataracts, and also contribute to skin cancer. The American[employer]y of Dermatology recommends that children and adults wear a broad spectrum, waterproof sunscreen with a Sun Protection Factor (SPF) of 30 or higher. It is important to check the ingredient label to be sure the sunscreen will protect the skin from both UVA and UVB sunrays. Your sunscreen should contain at least one of the following ingredients: titanium dioxide, zinc oxide, or avobenzone. Sunscreen will not be effective unless it is applied to all exposed skin. Sunscreens work best if they are applied 30 minutes before sun exposure.   They should be reapplied every 2 hours and after any water exposure. Sunscreen is not perfect. It is important to use other methods to protect the skin from sun exposure also. Wear hats, sunglasses and other sun protective clothing when outdoors.   Stay in the shade during the peak hours of sun exposure between 10 AM and 4 PM.

## 2022-02-09 NOTE — PROGRESS NOTES
Dermatology Patient Note  [address]  [address]  Dept: [phone]  Dept Fax: [phone]      VISITDATE: 2/9/2022   REFERRING PROVIDER: Anyi Peña MD      Ralf Torres is a 77 y.o. female  who presents today in the office for:    New Patient (Pt would like to have a full body skin exam, she states that she has dermatitis on her leg and is using triamcinolone. )      HISTORY OF PRESENT ILLNESS:  As above. 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, takes antiviral as needed for flares    MEDICAL PROBLEMS:  Patient Active Problem List    Diagnosis Date Noted    Subluxation of tendon of long head of biceps 01/07/2022    Complete tear of right rotator cuff 01/07/2022    Right shoulder pain 11/29/2021    Right knee pain 11/29/2021    Atopic dermatitis 11/29/2021    Osteoarthritis of right knee 05/19/2021    Rotator cuff tendonitis, right 05/04/2021    Vitamin D deficiency 04/11/2021    Gastroesophageal reflux disease without esophagitis 04/11/2021    Hypertension 04/11/2021    On statin therapy 03/02/2021    Hypercholesterolemia 03/02/2021    [facility] 72/25/5491       CURRENT MEDICATIONS:   Current Outpatient Medications   Medication Sig Dispense Refill    Naproxen Sodium (ALEVE PO) Take by mouth      metFORMIN (GLUCOPHAGE) 1000 MG tablet Take 1,000 mg by mouth 2 times daily (with meals)      losartan (COZAAR) 25 MG tablet Take 1 tablet by mouth daily 90 tablet 1    mirtazapine (REMERON) 30 MG tablet Take 1 tablet by mouth nightly 90 tablet 1    triamcinolone (KENALOG) 0.1 % cream Apply topically 2 times daily. 453 g 0    gabapentin (NEURONTIN) 600 MG tablet Take 2 tablets by mouth nightly for 90 days.  180 tablet 1    ibuprofen (ADVIL;MOTRIN) 800 MG tablet Take 1 tablet by mouth 2 times daily as needed for Pain 60 tablet 1    rosuvastatin (CRESTOR) 10 MG tablet rosuvastatin 10 mg tablet 90 tablet 1    montelukast (SINGULAIR) 10 MG tablet Take 1 tablet by mouth nightly 90 tablet 1    omeprazole (PRILOSEC) 40 MG delayed release capsule Take 1 capsule by mouth every morning (before breakfast) 1hr. Before eat breakfast 90 capsule 1    fluticasone-salmeterol (ADVAIR DISKUS) 100-50 MCG/DOSE diskus inhaler Inhale 1 puff into the lungs 2 times daily 180 each 1    escitalopram (LEXAPRO) 5 MG tablet Take 1 tablet by mouth nightly 90 tablet 1    fluticasone (FLONASE) 50 MCG/ACT nasal spray 2 sprays by Nasal route daily 3 each 1    acyclovir (ZOVIRAX) 400 MG tablet Take 1 tablet by mouth 2 times daily 180 tablet 0    BD PEN NEEDLE WIN 2ND GEN 32G X 4 MM MISC       BASAGLAR KWIKPEN 100 UNIT/ML injection pen 22 Units nightly       NOVOLOG FLEXPEN 100 UNIT/ML injection pen Inject into the skin 3 times daily (before meals) Sliding sccale      aspirin 81 MG chewable tablet Take 81 mg by mouth daily      vitamin D 25 MCG (1000 UT) CAPS Take by mouth      acetaminophen (TYLENOL) 500 MG tablet Take 500 mg by mouth every 6 hours as needed for Pain      GLUCOSAMINE HCL PO Take by mouth      Continuous Blood Gluc Sensor (FREESTYLE JUANI 2 SENSOR) [facility] As directed, change every 14 days       No current facility-administered medications for this visit. ALLERGIES:   Allergies   Allergen Reactions    Latex     Seasonal     Penicillins Rash       SOCIAL HISTORY:  Social History     Tobacco Use    Smoking status: Never Smoker    Smokeless tobacco: Never Used   Substance Use Topics    Alcohol use: No       Pertinent ROS:  Review of Systems  Skin: Denies any new changing, growing or bleeding lesions or rashes except as described in the HPI   Constitutional: Denies fevers, chills, and malaise.     PHYSICAL EXAM:   /81 (Site: Left Upper Arm, Position: Sitting, Cuff Size: Medium Adult)   Pulse 87   Temp 97.5 °F (36.4 °C) (Temporal)   Ht 5' 5\" (1.651 m)   Wt 169 lb 3.2 oz (76.7 kg)   SpO2 98%   BMI 28.16 kg/m²     The patient is generally well appearing, well nourished, alert and conversational. Affect is normal.    Cutaneous Exam:  Physical Exam  Total body skin exam excluding external genitalia: head/face, neck, both arms, chest, back, abdomen, both legs, buttocks, digits and/or nails, was examined. Genital exam was deferred as patient denied having any lesions in this area. Complete visualization of scalp may be limited by hair density, length, and/or style    Facial covering was removed during examination. Diagnoses/exam findings/medical history pertinent to this visit are listed below:    Assessment:   Diagnosis Orders   1. Seborrheic keratoses     2. Actinic keratoses     3. Multiple nevi     4. Atopic dermatitis, unspecified type     5. Family history of melanoma          Plan:  Seborrheic keratoses of right leg, scalp  - reassurance and education    Actinic keratoses  Cryotherapy: After verbal consent was obtained including discussion of the risks (lesion persistence, lesion recurrence and hypo/hyperpigmentation) and benefits (resolution of the lesion) 1 total Actinic Keratosis on the right cheek were treated with liquid nitrogen to achieve a 2-3 mm freeze border. Multiple nevi  - Clinically and dermatoscopically benign on exam today. - Common nevi have a low individual risk of developing into melanoma. Patients with >50 nevi have a greater risk of developing melanoma in their lifetime and should undergo skin checks at least annually. - I recommended the patient apply broad spectrum spf 30+ sunscreen daily, reapplying every 2 hours  - In additional to regular use of sunscreen, I recommended broad-rimmed hats, long sleeves, and seeking the shade.     Atopic Dermatitis of lower legs  - continue triamcinolone as needed (has refills)    Family History of Melanoma  - annual skin check    RTC 1 year    Future Appointments   Date Time Provider Department Center   2/10/2022 12:15 PM MD KAREN Cline  [facility]   2/18/2022  8:15 AM MD KAREN Moran [facility]   3/9/2022  1:15 PM MD KAREN Moran [facility]   4/6/2022  1:15 PM MD KAREN Tanner [facility]   5/18/2022  1:15 PM MD KAREN Tanner [facility]   6/6/2022  8:30 AM MD KAREN Cline [facility]   2/9/2023  8:30 AM Janet Gomez MD [facility]         Patient Instructions   For Atopic Dermatitis  - continue triamcinolone    Cryotherapy    Liquid Nitrogen - \"freeze\" (Cryotherapy)  Your doctor has treated your skin lesions with a very cold substance. The liquid nitrogen is so cold that it may feel like the skin is burning during application. A clear blister or blood blister may form after treatment and may later form a scab. Leave the area alone. Usually this scab will fall of within 1-2 weeks. The area should be kept clean and can be covered with Vaseline and a Band-Aid if needed. If a large blister develops it is ok to use a clean needle to gently pop the blister. Please call our office with any concerns at [phone]. Seborrheic Keratosis  Seborrheic keratoses are common benign growths of unknown cause seen in adults due to a thickening of an area of the top skin layer. Who's At Risk  Although they can occur anytime after puberty, almost everyone over 48 has one or more of these and they increase in number with age. Some families have an inherited tendency to grow multiple lesions. Men and women are equally as likely to develop seborrheic keratoses. Dark-skinned people are less affected than those with light skin; a variant seen in blacks is called dermatosis papulosa nigra. Signs & Symptoms  One or more spots can occur anywhere on the body, except for palms, soles, and mucous membranes (eg, in the mouth or rectum). They do not go away. They do not turn into cancers, but some cancers resemble seborrheic keratosis.   They start as light brown to skin-colored, flat areas, which are round to oval and of varying size (usually less than a half inch, but sometimes much larger). As they grow thicker and rise above the skin surface, seborrheic keratoses may become dark brown to almost black with a \"stuck on\" appearance. The surface may feel smooth or rough. Self-Care Guidelines  No treatment is needed unless there is irritation from clothing with itching or bleeding. There is no way to prevent new spots from forming. Some lotions with alpha hydroxyl acids may make the areas feel smoother with regular use but will not eliminate them. OTC freezing techniques are available but usually not effective. When to [facility]  If a spot on the skin is growing, bleeding, painful, or itchy, or any other concerning changes, then see your doctor. Actinic Keratosis (AKs)   Actinic Keratosis are skin lesions caused by long-term exposure to the sun. They are scaly, rough, to the touch, irregularly shaped, and skin-colored, reddish brown, or yellowish in color. Recent Studies suggest that some AK lesions actually may be a very early form of a type of skin cancer, squamous cell carcinoma (SCC), that has not spread beyond a small, confined area. It is not yet possible to tell which AK lesions will go on to become skin cancer. Experts from the [employer] the [address] [place] [address], and the [facility] of Dermatology have recommended that all patients with AK lesions be evaluated and undergo some form of treatment. Your dermatologist can determine which type of treatment-either alone or in combination-is right for you. SUN PROTECTION AND OBSERVATION  Your dermatologist may recommend that you use a sun block, wear a hat and clothing to prevent sun exposure, and have regular skin examinations. Some AKs go away without further treatment, provided that the skin is not subjected to more sun damage.  However, regular examinations will help catch the lesions that need to be treated. LESION-TARGETED THERAPIES   Liquid nitrogen (cryotherapy) destroys AKs by freezing them. This results in blistering and shedding of the AKs. Cryotherapy is the most common treatment when a patient has a few, small AK lesions. Topical chemotherapy is a cream that targets sun-damaged and pre-cancerous cells and destroys them. Moles    Moles, or nevi, are very common. Moles are areas of the skin where there are more cells called melanocytes. Melanocytes are the cells in the body that produce pigment, or color. Moles can be many colors including skin-tone, pink, tan, brown, and very dark brown to black. Moles can be raised or flat. Moles can have hair. Moles can grow on any skin surface, including the scalp, hands and feet. When someone is born with a mole, or develops one in the first months of life, the mole is called a congenital, or birthmark mole. About 1 in 100 people are born with one or more moles. Most people develop their moles later in childhood or adulthood. These are called acquired moles. They are most common on sun exposed areas of skin such as the face, neck, upper body, arms and legs. CHECKING MOLES  Most moles are harmless, but in rare cases moles may become cancerous. Checking moles and looking for changes is an important step in helping to catch worrisome changes early. Some changes to look for are asymmetry (moles that do not look the same on each half), irregular shapes or borders, uneven color or large size. Also look for any moles that bleed, itch, or become painful. Looking at your skin regularly can help you recognize moles that are more at risk for becoming cancerous. WHEN TO CALL THE DOCTOR  Call your doctor if you see any of the following changes in a mole:       Irregular borders (uneven shape or edges)       Changes in color to black, blue or red.        Changes in the surface texture       Scabs, scaling, irritation or bleeding in the mole    TREATMENT FOR MOLES  Often we can simply look at your moles and tell you if they look worrisome. If we are not concerned about the look of your moles at your appointment, we may measure some moles and take some photos that will allow us to watch for future changes in the moles. TREATMENT FOR MOLES  If a mole is getting irritated frequently, bleeding, difficult to watch due to location or dark color, atypical in appearance, or worrisome, we may perform a skin biopsy. A skin biopsy is a procedure that involves removing the mole so that it can be looked at under a microscope. There are many methods used to remove moles. The method we choose depends on the location of the mole, the size of the mole, and the amount of concern for skin cancer. Generally, removing moles in the dermatologists office is a simple and safe procedure that can be done with local anesthesia. PREVENTION  You can do some things to prevent moles from becoming cancerous:       Try to avoid long periods of time in the sun and severe sunburns. The sun is        especially dangerous between 10:00 am and 4:00 pm.       Use a broad spectrum, water-resistant sun block lotion with an SPF of 30 or        greater. A broad spectrum lotion blocks both UVA and UVB rays from the sun. Re-apply sunscreen at least every 2 hours and after swimming or sweating.      Take advantage of shade whenever possible. Wear a broad-brimmed hat,        sunglasses, and protective clothing when outdoors.      Do not use tanning beds. Sun Protection     There are two types of sun rays that are harmful to the skin. UVA rays cause skin aging and skin cancer, such as melanoma. UVB rays cause sunburns, cataracts, and also contribute to skin cancer. The American[employer]y of Dermat[employer] recommends that children and adults wear a broad spectrum, waterproof sunscreen with a Sun Protection Factor (SPF) of 30 or higher.   It is important to check the ingredient label to be sure the sunscreen will protect the skin from both UVA and UVB sunrays. Your sunscreen should contain at least one of the following ingredients: titanium dioxide, zinc oxide, or avobenzone. Sunscreen will not be effective unless it is applied to all exposed skin. Sunscreens work best if they are applied 30 minutes before sun exposure. They should be reapplied every 2 hours and after any water exposure. Sunscreen is not perfect. It is important to use other methods to protect the skin from sun exposure also. Wear hats, sunglasses and other sun protective clothing when outdoors. Stay in the shade during the peak hours of sun exposure between 10 AM and 4 PM.        This note was created with the assistance of a speech-recognition program.  Although the intention is to generate a document that actually reflects the content of the visit, no guarantees can be provided that every mistake has been identified and corrected by editing. I, Dr. Sandrita Monte, personally performed the services described in this documentation, as scribed by Roe Fiztgerald in my presence, and it is both accurate and complete.      Electronically signed by Sara Townsend MD on 2/9/22 at 12:03 PM EST

## 2022-02-10 ENCOUNTER — OFFICE VISIT (OUTPATIENT)
Dept: FAMILY MEDICINE CLINIC | Age: 67
End: 2022-02-10
Payer: MEDICARE

## 2022-02-10 VITALS
HEART RATE: 83 BPM | DIASTOLIC BLOOD PRESSURE: 74 MMHG | WEIGHT: 174 LBS | HEIGHT: 65 IN | BODY MASS INDEX: 28.99 KG/M2 | SYSTOLIC BLOOD PRESSURE: 136 MMHG | OXYGEN SATURATION: 99 %

## 2022-02-10 DIAGNOSIS — Z79.899 ON STATIN THERAPY: ICD-10-CM

## 2022-02-10 DIAGNOSIS — K21.9 GASTROESOPHAGEAL REFLUX DISEASE WITHOUT ESOPHAGITIS: ICD-10-CM

## 2022-02-10 DIAGNOSIS — E55.9 VITAMIN D DEFICIENCY: ICD-10-CM

## 2022-02-10 DIAGNOSIS — I10 HYPERTENSION, UNSPECIFIED TYPE: ICD-10-CM

## 2022-02-10 DIAGNOSIS — E78.00 HYPERCHOLESTEROLEMIA: ICD-10-CM

## 2022-02-10 DIAGNOSIS — E11.9 TYPE 2 DIABETES MELLITUS WITHOUT COMPLICATION, WITHOUT LONG-TERM CURRENT USE OF INSULIN (HCC): Primary | ICD-10-CM

## 2022-02-10 PROCEDURE — 1036F TOBACCO NON-USER: CPT | Performed by: INTERNAL MEDICINE

## 2022-02-10 PROCEDURE — 1090F PRES/ABSN URINE INCON ASSESS: CPT | Performed by: INTERNAL MEDICINE

## 2022-02-10 PROCEDURE — G8484 FLU IMMUNIZE NO ADMIN: HCPCS | Performed by: INTERNAL MEDICINE

## 2022-02-10 PROCEDURE — 1123F ACP DISCUSS/DSCN MKR DOCD: CPT | Performed by: INTERNAL MEDICINE

## 2022-02-10 PROCEDURE — G8399 PT W/DXA RESULTS DOCUMENT: HCPCS | Performed by: INTERNAL MEDICINE

## 2022-02-10 PROCEDURE — 99214 OFFICE O/P EST MOD 30 MIN: CPT | Performed by: INTERNAL MEDICINE

## 2022-02-10 PROCEDURE — 4040F PNEUMOC VAC/ADMIN/RCVD: CPT | Performed by: INTERNAL MEDICINE

## 2022-02-10 PROCEDURE — G8417 CALC BMI ABV UP PARAM F/U: HCPCS | Performed by: INTERNAL MEDICINE

## 2022-02-10 PROCEDURE — 3017F COLORECTAL CA SCREEN DOC REV: CPT | Performed by: INTERNAL MEDICINE

## 2022-02-10 PROCEDURE — G8427 DOCREV CUR MEDS BY ELIG CLIN: HCPCS | Performed by: INTERNAL MEDICINE

## 2022-02-10 PROCEDURE — 3046F HEMOGLOBIN A1C LEVEL >9.0%: CPT | Performed by: INTERNAL MEDICINE

## 2022-02-10 PROCEDURE — 2022F DILAT RTA XM EVC RTNOPTHY: CPT | Performed by: INTERNAL MEDICINE

## 2022-02-10 ASSESSMENT — ENCOUNTER SYMPTOMS
RESPIRATORY NEGATIVE: 1
ALLERGIC/IMMUNOLOGIC NEGATIVE: 1
GASTROINTESTINAL NEGATIVE: 1
EYES NEGATIVE: 1

## 2022-02-10 NOTE — PROGRESS NOTES
[address]   [facility]  [address]  [facility], [facility] [address]      Date of Visit:  2/10/2022  Patient Name: Joyce Peña   Patient :  1955     CHIEF COMPLAINT:     Joyce Peañ is a 77 y.o. female who presents today for an general visit to be evaluated for the following condition(s):  Chief Complaint   Patient presents with    Pre-op Exam     Clearance:        REVIEW OF SYSTEM      Review of Systems   Constitutional: Negative. HENT: Negative. Eyes: Negative. Respiratory: Negative. Cardiovascular: Negative. Gastrointestinal: Negative. Endocrine: Negative. Genitourinary: Negative. Musculoskeletal: Negative. Right shoulder pain   Skin: Negative. Allergic/Immunologic: Negative. Neurological: Negative. Hematological: Negative. Psychiatric/Behavioral: Negative. All other systems reviewed and are negative. HISTORY OF PRESENT ILLNESS     HPI    Patient here for surgery clearance for 22 R shoulder at [facility] with Dr. Echeverria Poster. Patient had preadmission testing completed on 2022. Patient states that pre admission testing nurse told her that she does not need an EKG since she had one completed in 2021.     REVIEWED INFORMATION      Allergies   Allergen Reactions    Latex     Seasonal     Penicillins Rash       Patient Active Problem List   Diagnosis    Type 2 diabetes mellitus without complication ([facility] [address]    On statin therapy    Hypercholesterolemia    Vitamin D deficiency    Gastroesophageal reflux disease without esophagitis    Hypertension    Rotator cuff tendonitis, right    Osteoarthritis of right knee    Right shoulder pain    Right knee pain    Atopic dermatitis    Subluxation of tendon of long head of biceps    Complete tear of right rotator cuff       Past Medical History:   Diagnosis Date    Asthma     seasonal    Diabetes mellitus (HCC)     type 2    Hyperlipidemia     PONV (postoperative nausea and vomiting)        Past Surgical History:   Procedure Laterality Date    APPENDECTOMY  07/07/2016    laparoscopic    BREAST BIOPSY Left     BREAST SURGERY      b/l reduction    COLONOSCOPY      HYSTERECTOMY          Social History     Socioeconomic History    Marital status:      Spouse name: Not on file    Number of children: Not on file    Years of education: Not on file    Highest education level: Not on file   Occupational History    Not on file   Tobacco Use    Smoking status: Never Smoker    Smokeless tobacco: Never Used   Vaping Use    Vaping Use: Never used   Substance and Sexual Activity    Alcohol use: No    Drug use: Never    Sexual activity: Not on file   Other Topics Concern    Not on file   Social History Narrative    Not on file     Social Determinants of Health     Financial Resource Strain: Low Risk     Difficulty of Paying Living Expenses: Not hard at all   Food Insecurity: No Food Insecurity    Worried About Running Out of Food in the Last Year: Never true    Len of Food in the Last Year: Never true   Transportation Needs: No Transportation Needs    Lack of Transportation (Medical): No    Lack of Transportation (Non-Medical):  No   Physical Activity:     Days of Exercise per Week: Not on file    Minutes of Exercise per Session: Not on file   Stress:     Feeling of Stress : Not on file   Social Connections:     Frequency of Communication with Friends and Family: Not on file    Frequency of Social Gatherings with Friends and Family: Not on file    Attends Taoist Services: Not on file    Active Member of Clubs or Organizations: Not on file    Attends Club or Organization Meetings: Not on file    Marital Status: Not on file   Intimate Partner Violence:     Fear of Current or Ex-Partner: Not on file    Emotionally Abused: Not on file    Physically Abused: Not on file   Raza Rob Sexually Abused: Not on file   Housing Stability:     Unable to Pay for Housing in the Last Year: Not on file    Number of Places Lived in the Last Year: Not on file    Unstable Housing in the Last Year: Not on file        Family History   Problem Relation Age of Onset    Asthma Mother     High Blood Pressure Mother     Other Mother     Depression Mother     Heart Disease Father     High Cholesterol Father     High Blood Pressure Father     High Cholesterol Brother        PHYSICAL EXAM     /74   Pulse 83   Ht 5' 5\" (1.651 m)   Wt 174 lb (78.9 kg)   SpO2 99%   BMI 28.96 kg/m²    Physical Exam  Vitals and nursing note reviewed. Constitutional:       Appearance: Normal appearance. HENT:      Head: Normocephalic and atraumatic. Right Ear: Tympanic membrane, ear canal and external ear normal.      Left Ear: Tympanic membrane, ear canal and external ear normal.      Nose: Nose normal.      Mouth/Throat:      Mouth: Mucous membranes are moist.   Eyes:      Extraocular Movements: Extraocular movements intact. Conjunctiva/sclera: Conjunctivae normal.      Pupils: Pupils are equal, round, and reactive to light. Cardiovascular:      Rate and Rhythm: Normal rate and regular rhythm. Pulses: Normal pulses. Heart sounds: Normal heart sounds. Pulmonary:      Effort: Pulmonary effort is normal.      Breath sounds: Normal breath sounds. Abdominal:      General: Abdomen is flat. Bowel sounds are normal.      Palpations: Abdomen is soft. Musculoskeletal:      Cervical back: Normal range of motion and neck supple. Comments: Decreased rom at tight shoulder, weakness on rtc testing   Skin:     General: Skin is warm. Capillary Refill: Capillary refill takes less than 2 seconds. Neurological:      General: No focal deficit present. Mental Status: She is alert and oriented to person, place, and time.    Psychiatric:         Mood and Affect: Mood normal.         Behavior: Behavior normal.         Thought Content: Thought content normal.         Judgment: Judgment normal.         ASSESSMENT/PLAN     1. Pre op clearance    2. Gastroesophageal reflux disease without esophagitis    3. Type 2 diabetes mellitus without complication, without long-term current use of insulin ([facility] [address]    4. Hypertension, unspecified type    5. Hypercholesterolemia    6. On statin therapy    7. Vitamin D deficiency    Records reviewed     Labs reviewed     Patient here for pre op clearance for right shoulder surgery scheduled 2/22/2022.     Right shoulder surgery due to OA and RTC - pre admission testing completed, preadmission okayed her prior EKG from 11/2021, labs reviewed, will send surgery clearance     Healthcare Maintenance - Medicare Annual wellness completed 11/29/2021, mammogram 4/2021, colonoscopy up to date, immunizations are up to date, screening labs are up to date, cont working to optimize diet and exercise.       DM - cont current regimen, working with endo, A1C in office today 6.9, recheck scheduled     Cholesterol - cont med, cont working to optimize diet and healthy activity, recheck scheduled     Vit d Def  - cont diet rich in Vit D, recheck scheduled     Reflux - cont med, avoid triggers     Anxiety/depression - cont med, sx well controlled currently     Allergies/asthma - has been stable, used meds for flare ups     History of atopic dermatitis - dermatology referral per patient's request at previous visit         COMMUNICATION:       Electronically signed by Darcy Mohs, MD on 2/10/2022 at 12:23 PM

## 2022-02-15 ENCOUNTER — ANESTHESIA EVENT (OUTPATIENT)
Dept: OPERATING ROOM | Age: 67
End: 2022-02-15
Payer: MEDICARE

## 2022-02-15 RX ORDER — GABAPENTIN 300 MG/1
300 CAPSULE ORAL DAILY
Qty: 7 CAPSULE | Refills: 0 | Status: CANCELLED | OUTPATIENT
Start: 2022-02-15 | End: 2022-02-22

## 2022-02-18 ENCOUNTER — OFFICE VISIT (OUTPATIENT)
Dept: ORTHOPEDIC SURGERY | Age: 67
End: 2022-02-18

## 2022-02-18 VITALS — HEIGHT: 65 IN | BODY MASS INDEX: 28.99 KG/M2 | WEIGHT: 174 LBS

## 2022-02-18 DIAGNOSIS — S43.003A SUBLUXATION OF TENDON OF LONG HEAD OF BICEPS: ICD-10-CM

## 2022-02-18 DIAGNOSIS — M25.511 RIGHT SHOULDER PAIN, UNSPECIFIED CHRONICITY: ICD-10-CM

## 2022-02-18 DIAGNOSIS — M75.121 COMPLETE TEAR OF RIGHT ROTATOR CUFF, UNSPECIFIED WHETHER TRAUMATIC: Primary | ICD-10-CM

## 2022-02-18 PROCEDURE — 99024 POSTOP FOLLOW-UP VISIT: CPT | Performed by: ORTHOPAEDIC SURGERY

## 2022-02-18 RX ORDER — ONDANSETRON 4 MG/1
4 TABLET, FILM COATED ORAL DAILY PRN
Qty: 20 TABLET | Refills: 0 | Status: SHIPPED | OUTPATIENT
Start: 2022-02-18 | End: 2022-05-18

## 2022-02-18 RX ORDER — HYDROCODONE BITARTRATE AND ACETAMINOPHEN 5; 325 MG/1; MG/1
1 TABLET ORAL EVERY 4 HOURS PRN
Qty: 42 TABLET | Refills: 0 | Status: SHIPPED | OUTPATIENT
Start: 2022-02-18 | End: 2022-02-25

## 2022-02-18 NOTE — PROGRESS NOTES
ORTHOPEDIC PATIENT EVALUATION      HPI / Chief Complaint  Ms. Mike Garcia is a 68-year-old here today for her preoperative visit regarding her right shoulder. She has a full-thickness rotator cuff tear managed conservatively with cortisone injections as well as anti-inflammatories. She is electing at this time to forego continued attempts at conservative treatment to proceed with surgical intervention by way of an arthroscopic rotator cuff repair and biceps tenodesis. Past Medical History  Cande Isabel  has a past medical history of Asthma, Diabetes mellitus ([address] Hyperlipidemia, and PONV (postoperative nausea and vomiting). Past Surgical History  Cande Isabel  has a past surgical history that includes Hysterectomy; Colonoscopy; Appendectomy (07/07/2016); Breast surgery; and Breast biopsy (Left). Current Medications  Current Outpatient Medications   Medication Sig Dispense Refill    HYDROcodone-acetaminophen (NORCO) 5-325 MG per tablet Take 1 tablet by mouth every 4 hours as needed for Pain for up to 7 days. 42 tablet 0    ondansetron (ZOFRAN) 4 MG tablet Take 1 tablet by mouth daily as needed for Nausea or Vomiting 20 tablet 0    Naproxen Sodium (ALEVE PO) Take by mouth      Continuous Blood Gluc Sensor (FREESTYLE JUANI 2 SENSOR) MISC As directed, change every 14 days      metFORMIN (GLUCOPHAGE) 1000 MG tablet Take 1,000 mg by mouth 2 times daily (with meals)      losartan (COZAAR) 25 MG tablet Take 1 tablet by mouth daily 90 tablet 1    mirtazapine (REMERON) 30 MG tablet Take 1 tablet by mouth nightly 90 tablet 1    triamcinolone (KENALOG) 0.1 % cream Apply topically 2 times daily. 453 g 0    gabapentin (NEURONTIN) 600 MG tablet Take 2 tablets by mouth nightly for 90 days.  180 tablet 1    ibuprofen (ADVIL;MOTRIN) 800 MG tablet Take 1 tablet by mouth 2 times daily as needed for Pain 60 tablet 1    rosuvastatin (CRESTOR) 10 MG tablet rosuvastatin 10 mg tablet 90 tablet 1    montelukast (SINGULAIR) 10 MG tablet Take 1 tablet by mouth nightly 90 tablet 1    omeprazole (PRILOSEC) 40 MG delayed release capsule Take 1 capsule by mouth every morning (before breakfast) 1hr. Before eat breakfast 90 capsule 1    fluticasone-salmeterol (ADVAIR DISKUS) 100-50 MCG/DOSE diskus inhaler Inhale 1 puff into the lungs 2 times daily 180 each 1    escitalopram (LEXAPRO) 5 MG tablet Take 1 tablet by mouth nightly 90 tablet 1    fluticasone (FLONASE) 50 MCG/ACT nasal spray 2 sprays by Nasal route daily 3 each 1    acyclovir (ZOVIRAX) 400 MG tablet Take 1 tablet by mouth 2 times daily 180 tablet 0    BD PEN NEEDLE WIN 2ND GEN 32G X 4 MM MISC       BASAGLAR KWIKPEN 100 UNIT/ML injection pen 22 Units nightly       NOVOLOG FLEXPEN 100 UNIT/ML injection pen Inject into the skin 3 times daily (before meals) Sliding sccale      aspirin 81 MG chewable tablet Take 81 mg by mouth daily      vitamin D 25 MCG (1000 UT) CAPS Take by mouth      acetaminophen (TYLENOL) 500 MG tablet Take 500 mg by mouth every 6 hours as needed for Pain      GLUCOSAMINE HCL PO Take by mouth       No current facility-administered medications for this visit. Allergies  Allergies have been reviewed. Zari Dudley is allergic to latex, seasonal, and penicillins. Social History  Zari Dudley  reports that she has never smoked. She has never used smokeless tobacco. She reports that she does not drink alcohol and does not use drugs. Family History  Marianna's family history includes Asthma in her mother; Depression in her mother; Heart Disease in her father; High Blood Pressure in her father and mother; High Cholesterol in her brother and father; Other in her mother. Review of Systems   History obtained from the patient.    REVIEW OF SYSTEMS:   Constitution: negative for fever, chills, weight loss or malaise   Musculoskeletal: As noted in the HPI   Neurologic: As noted in the HPI    Physical Exam  Ht 5' 5\" (1.651 m)   Wt 174 lb (78.9 kg)   BMI 28.96 kg/m² General Appearance: alert, well appearing, and in no distress  Mental Status: alert, oriented to person, place, and time  Evaluation of her shoulder demonstrates intact skin without warmth, erythema, or notable swelling. Heart: RRR  Lungs: CTA bilaterally    Assessment and Plan  Nikia Zimmerman is a 77 y.o. old female with a right shoulder full-thickness rotator cuff tear with what appears to be a medially subluxed biceps tendon. Again she is electing to forego continued attempts at conservative management to proceed with surgical intervention by way of an arthroscopic rotator cuff repair and biceps tenodesis. We discussed the details of surgery, postoperative recovery course and expected outcome. She was provided her prescriptions for postoperative analgesia and a sling to protect her shoulder leading up to and following surgery. She has obtained appropriate preoperative medical clearance. All questions were answered. We will proceed with surgery as currently scheduled. This note is created with the assistance of a speech recognition program.  While intending to generate adocument that actually reflects the content of the visit, the document can still have some errors including those of syntax and sound a like substitutions which may escape proof reading. It such instances, actual meaningcan be extrapolated by contextual diversion.

## 2022-02-21 RX ORDER — SODIUM CHLORIDE 0.9 % (FLUSH) 0.9 %
10 SYRINGE (ML) INJECTION EVERY 12 HOURS SCHEDULED
Status: CANCELLED | OUTPATIENT
Start: 2022-02-21

## 2022-02-21 RX ORDER — ACETAMINOPHEN 325 MG/1
1000 TABLET ORAL ONCE
Status: CANCELLED | OUTPATIENT
Start: 2022-02-21 | End: 2022-02-21

## 2022-02-21 RX ORDER — SODIUM CHLORIDE 9 MG/ML
25 INJECTION, SOLUTION INTRAVENOUS PRN
Status: CANCELLED | OUTPATIENT
Start: 2022-02-21

## 2022-02-21 RX ORDER — SODIUM CHLORIDE 0.9 % (FLUSH) 0.9 %
10 SYRINGE (ML) INJECTION PRN
Status: CANCELLED | OUTPATIENT
Start: 2022-02-21

## 2022-02-22 ENCOUNTER — ANESTHESIA (OUTPATIENT)
Dept: OPERATING ROOM | Age: 67
End: 2022-02-22
Payer: MEDICARE

## 2022-02-22 ENCOUNTER — HOSPITAL ENCOUNTER (OUTPATIENT)
Age: 67
Setting detail: OUTPATIENT SURGERY
Discharge: HOME | End: 2022-02-22
Attending: ORTHOPAEDIC SURGERY | Admitting: ORTHOPAEDIC SURGERY
Payer: MEDICARE

## 2022-02-22 VITALS
OXYGEN SATURATION: 96 % | DIASTOLIC BLOOD PRESSURE: 80 MMHG | BODY MASS INDEX: 27.99 KG/M2 | HEART RATE: 63 BPM | TEMPERATURE: 97.2 F | RESPIRATION RATE: 16 BRPM | HEIGHT: 65 IN | WEIGHT: 168 LBS | SYSTOLIC BLOOD PRESSURE: 124 MMHG

## 2022-02-22 VITALS — TEMPERATURE: 90 F | DIASTOLIC BLOOD PRESSURE: 67 MMHG | OXYGEN SATURATION: 93 % | SYSTOLIC BLOOD PRESSURE: 106 MMHG

## 2022-02-22 LAB
GLUCOSE BLD-MCNC: 128 MG/DL (ref 65–105)
GLUCOSE BLD-MCNC: 131 MG/DL (ref 65–105)

## 2022-02-22 PROCEDURE — 6370000000 HC RX 637 (ALT 250 FOR IP)

## 2022-02-22 PROCEDURE — 82947 ASSAY GLUCOSE BLOOD QUANT: CPT

## 2022-02-22 PROCEDURE — 6360000002 HC RX W HCPCS: Performed by: ANESTHESIOLOGY

## 2022-02-22 PROCEDURE — C9290 INJ, BUPIVACAINE LIPOSOME: HCPCS | Performed by: ANESTHESIOLOGY

## 2022-02-22 PROCEDURE — 3600000004 HC SURGERY LEVEL 4 BASE: Performed by: ORTHOPAEDIC SURGERY

## 2022-02-22 PROCEDURE — 7100000000 HC PACU RECOVERY - FIRST 15 MIN: Performed by: ORTHOPAEDIC SURGERY

## 2022-02-22 PROCEDURE — 2580000003 HC RX 258: Performed by: ANESTHESIOLOGY

## 2022-02-22 PROCEDURE — 2709999900 HC NON-CHARGEABLE SUPPLY: Performed by: ORTHOPAEDIC SURGERY

## 2022-02-22 PROCEDURE — 64415 NJX AA&/STRD BRCH PLXS IMG: CPT | Performed by: ANESTHESIOLOGY

## 2022-02-22 PROCEDURE — 7100000011 HC PHASE II RECOVERY - ADDTL 15 MIN: Performed by: ORTHOPAEDIC SURGERY

## 2022-02-22 PROCEDURE — 2500000003 HC RX 250 WO HCPCS: Performed by: NURSE ANESTHETIST, CERTIFIED REGISTERED

## 2022-02-22 PROCEDURE — 6360000002 HC RX W HCPCS: Performed by: ORTHOPAEDIC SURGERY

## 2022-02-22 PROCEDURE — 6360000002 HC RX W HCPCS

## 2022-02-22 PROCEDURE — 2580000003 HC RX 258: Performed by: NURSE ANESTHETIST, CERTIFIED REGISTERED

## 2022-02-22 PROCEDURE — 3700000001 HC ADD 15 MINUTES (ANESTHESIA): Performed by: ORTHOPAEDIC SURGERY

## 2022-02-22 PROCEDURE — 7100000010 HC PHASE II RECOVERY - FIRST 15 MIN: Performed by: ORTHOPAEDIC SURGERY

## 2022-02-22 PROCEDURE — C1713 ANCHOR/SCREW BN/BN,TIS/BN: HCPCS | Performed by: ORTHOPAEDIC SURGERY

## 2022-02-22 PROCEDURE — 3700000000 HC ANESTHESIA ATTENDED CARE: Performed by: ORTHOPAEDIC SURGERY

## 2022-02-22 PROCEDURE — 2720000010 HC SURG SUPPLY STERILE: Performed by: ORTHOPAEDIC SURGERY

## 2022-02-22 PROCEDURE — 6360000002 HC RX W HCPCS: Performed by: NURSE ANESTHETIST, CERTIFIED REGISTERED

## 2022-02-22 PROCEDURE — 29827 SHO ARTHRS SRG RT8TR CUF RPR: CPT | Performed by: ORTHOPAEDIC SURGERY

## 2022-02-22 PROCEDURE — 7100000001 HC PACU RECOVERY - ADDTL 15 MIN: Performed by: ORTHOPAEDIC SURGERY

## 2022-02-22 PROCEDURE — 2500000003 HC RX 250 WO HCPCS: Performed by: ANESTHESIOLOGY

## 2022-02-22 PROCEDURE — 3600000014 HC SURGERY LEVEL 4 ADDTL 15MIN: Performed by: ORTHOPAEDIC SURGERY

## 2022-02-22 DEVICE — ANCHOR SUT L24.5MM DIA4.75MM BIOCOMPOSITE SELF PUNCHING: Type: IMPLANTABLE DEVICE | Site: SHOULDER | Status: FUNCTIONAL

## 2022-02-22 DEVICE — ANCHOR SUT L16.3MM DIA5.5MM TI W/ TWO SZ 2 FIBERWIRE CRKSCR: Type: IMPLANTABLE DEVICE | Site: SHOULDER | Status: FUNCTIONAL

## 2022-02-22 RX ORDER — SODIUM CHLORIDE 0.9 % (FLUSH) 0.9 %
10 SYRINGE (ML) INJECTION EVERY 12 HOURS SCHEDULED
Status: DISCONTINUED | OUTPATIENT
Start: 2022-02-22 | End: 2022-02-22 | Stop reason: HOSPADM

## 2022-02-22 RX ORDER — SODIUM CHLORIDE, SODIUM LACTATE, POTASSIUM CHLORIDE, CALCIUM CHLORIDE 600; 310; 30; 20 MG/100ML; MG/100ML; MG/100ML; MG/100ML
INJECTION, SOLUTION INTRAVENOUS CONTINUOUS
Status: DISCONTINUED | OUTPATIENT
Start: 2022-02-22 | End: 2022-02-22 | Stop reason: HOSPADM

## 2022-02-22 RX ORDER — DEXAMETHASONE SODIUM PHOSPHATE 10 MG/ML
INJECTION, SOLUTION INTRAMUSCULAR; INTRAVENOUS
Status: COMPLETED
Start: 2022-02-22 | End: 2022-02-22

## 2022-02-22 RX ORDER — SODIUM CHLORIDE 9 MG/ML
25 INJECTION, SOLUTION INTRAVENOUS PRN
Status: DISCONTINUED | OUTPATIENT
Start: 2022-02-22 | End: 2022-02-22 | Stop reason: HOSPADM

## 2022-02-22 RX ORDER — BUPIVACAINE HYDROCHLORIDE 5 MG/ML
INJECTION, SOLUTION EPIDURAL; INTRACAUDAL
Status: COMPLETED
Start: 2022-02-22 | End: 2022-02-22

## 2022-02-22 RX ORDER — PROPOFOL 10 MG/ML
INJECTION, EMULSION INTRAVENOUS PRN
Status: DISCONTINUED | OUTPATIENT
Start: 2022-02-22 | End: 2022-02-22 | Stop reason: SDUPTHER

## 2022-02-22 RX ORDER — MIDAZOLAM HYDROCHLORIDE 1 MG/ML
INJECTION INTRAMUSCULAR; INTRAVENOUS
Status: COMPLETED
Start: 2022-02-22 | End: 2022-02-22

## 2022-02-22 RX ORDER — DEXAMETHASONE SODIUM PHOSPHATE 10 MG/ML
INJECTION, SOLUTION INTRAMUSCULAR; INTRAVENOUS PRN
Status: DISCONTINUED | OUTPATIENT
Start: 2022-02-22 | End: 2022-02-22 | Stop reason: SDUPTHER

## 2022-02-22 RX ORDER — SODIUM CHLORIDE 9 MG/ML
INJECTION, SOLUTION INTRAVENOUS CONTINUOUS
Status: DISCONTINUED | OUTPATIENT
Start: 2022-02-22 | End: 2022-02-22 | Stop reason: HOSPADM

## 2022-02-22 RX ORDER — MEPERIDINE HYDROCHLORIDE 50 MG/ML
12.5 INJECTION INTRAMUSCULAR; INTRAVENOUS; SUBCUTANEOUS EVERY 5 MIN PRN
Status: DISCONTINUED | OUTPATIENT
Start: 2022-02-22 | End: 2022-02-22 | Stop reason: HOSPADM

## 2022-02-22 RX ORDER — GLYCOPYRROLATE 1 MG/5 ML
SYRINGE (ML) INTRAVENOUS PRN
Status: DISCONTINUED | OUTPATIENT
Start: 2022-02-22 | End: 2022-02-22 | Stop reason: SDUPTHER

## 2022-02-22 RX ORDER — FENTANYL CITRATE 50 UG/ML
INJECTION, SOLUTION INTRAMUSCULAR; INTRAVENOUS
Status: COMPLETED
Start: 2022-02-22 | End: 2022-02-22

## 2022-02-22 RX ORDER — SODIUM CHLORIDE 0.9 % (FLUSH) 0.9 %
5-40 SYRINGE (ML) INJECTION EVERY 12 HOURS SCHEDULED
Status: DISCONTINUED | OUTPATIENT
Start: 2022-02-22 | End: 2022-02-22 | Stop reason: HOSPADM

## 2022-02-22 RX ORDER — MORPHINE SULFATE 1 MG/ML
1 INJECTION, SOLUTION EPIDURAL; INTRATHECAL; INTRAVENOUS EVERY 5 MIN PRN
Status: DISCONTINUED | OUTPATIENT
Start: 2022-02-22 | End: 2022-02-22 | Stop reason: HOSPADM

## 2022-02-22 RX ORDER — PHENYLEPHRINE HCL IN 0.9% NACL 1 MG/10 ML
SYRINGE (ML) INTRAVENOUS PRN
Status: DISCONTINUED | OUTPATIENT
Start: 2022-02-22 | End: 2022-02-22 | Stop reason: SDUPTHER

## 2022-02-22 RX ORDER — DEXAMETHASONE SODIUM PHOSPHATE 10 MG/ML
10 INJECTION, SOLUTION INTRAMUSCULAR; INTRAVENOUS ONCE
Status: DISCONTINUED | OUTPATIENT
Start: 2022-02-22 | End: 2022-02-22 | Stop reason: HOSPADM

## 2022-02-22 RX ORDER — DIPHENHYDRAMINE HYDROCHLORIDE 50 MG/ML
12.5 INJECTION INTRAMUSCULAR; INTRAVENOUS
Status: DISCONTINUED | OUTPATIENT
Start: 2022-02-22 | End: 2022-02-22 | Stop reason: HOSPADM

## 2022-02-22 RX ORDER — SCOLOPAMINE TRANSDERMAL SYSTEM 1 MG/1
1 PATCH, EXTENDED RELEASE TRANSDERMAL ONCE
Status: DISCONTINUED | OUTPATIENT
Start: 2022-02-22 | End: 2022-02-22 | Stop reason: HOSPADM

## 2022-02-22 RX ORDER — LIDOCAINE HYDROCHLORIDE 10 MG/ML
INJECTION, SOLUTION EPIDURAL; INFILTRATION; INTRACAUDAL; PERINEURAL PRN
Status: DISCONTINUED | OUTPATIENT
Start: 2022-02-22 | End: 2022-02-22 | Stop reason: SDUPTHER

## 2022-02-22 RX ORDER — FENTANYL CITRATE 50 UG/ML
INJECTION, SOLUTION INTRAMUSCULAR; INTRAVENOUS PRN
Status: DISCONTINUED | OUTPATIENT
Start: 2022-02-22 | End: 2022-02-22 | Stop reason: SDUPTHER

## 2022-02-22 RX ORDER — ONDANSETRON 2 MG/ML
INJECTION INTRAMUSCULAR; INTRAVENOUS
Status: COMPLETED
Start: 2022-02-22 | End: 2022-02-22

## 2022-02-22 RX ORDER — ACETAMINOPHEN 500 MG
1000 TABLET ORAL ONCE
Status: COMPLETED | OUTPATIENT
Start: 2022-02-22 | End: 2022-02-22

## 2022-02-22 RX ORDER — SCOLOPAMINE TRANSDERMAL SYSTEM 1 MG/1
PATCH, EXTENDED RELEASE TRANSDERMAL
Status: DISCONTINUED
Start: 2022-02-22 | End: 2022-02-22 | Stop reason: HOSPADM

## 2022-02-22 RX ORDER — SODIUM CHLORIDE 0.9 % (FLUSH) 0.9 %
5-40 SYRINGE (ML) INJECTION PRN
Status: DISCONTINUED | OUTPATIENT
Start: 2022-02-22 | End: 2022-02-22 | Stop reason: HOSPADM

## 2022-02-22 RX ORDER — NEOSTIGMINE METHYLSULFATE 5 MG/5 ML
SYRINGE (ML) INTRAVENOUS PRN
Status: DISCONTINUED | OUTPATIENT
Start: 2022-02-22 | End: 2022-02-22 | Stop reason: SDUPTHER

## 2022-02-22 RX ORDER — BUPIVACAINE HYDROCHLORIDE 5 MG/ML
INJECTION, SOLUTION EPIDURAL; INTRACAUDAL
Status: COMPLETED | OUTPATIENT
Start: 2022-02-22 | End: 2022-02-22

## 2022-02-22 RX ORDER — SODIUM CHLORIDE 0.9 % (FLUSH) 0.9 %
10 SYRINGE (ML) INJECTION PRN
Status: DISCONTINUED | OUTPATIENT
Start: 2022-02-22 | End: 2022-02-22 | Stop reason: HOSPADM

## 2022-02-22 RX ORDER — ONDANSETRON 2 MG/ML
4 INJECTION INTRAMUSCULAR; INTRAVENOUS
Status: DISCONTINUED | OUTPATIENT
Start: 2022-02-22 | End: 2022-02-22 | Stop reason: HOSPADM

## 2022-02-22 RX ORDER — ONDANSETRON 2 MG/ML
4 INJECTION INTRAMUSCULAR; INTRAVENOUS ONCE
Status: COMPLETED | OUTPATIENT
Start: 2022-02-22 | End: 2022-02-22

## 2022-02-22 RX ORDER — ACETAMINOPHEN 500 MG
TABLET ORAL
Status: COMPLETED
Start: 2022-02-22 | End: 2022-02-22

## 2022-02-22 RX ORDER — ROCURONIUM BROMIDE 10 MG/ML
INJECTION, SOLUTION INTRAVENOUS PRN
Status: DISCONTINUED | OUTPATIENT
Start: 2022-02-22 | End: 2022-02-22 | Stop reason: SDUPTHER

## 2022-02-22 RX ADMIN — Medication 100 MCG: at 10:38

## 2022-02-22 RX ADMIN — Medication 100 MCG: at 10:11

## 2022-02-22 RX ADMIN — Medication 100 MCG: at 09:56

## 2022-02-22 RX ADMIN — Medication 200 MCG: at 09:28

## 2022-02-22 RX ADMIN — FENTANYL CITRATE 100 MCG: 50 INJECTION INTRAMUSCULAR; INTRAVENOUS at 08:09

## 2022-02-22 RX ADMIN — ROCURONIUM BROMIDE 50 MG: 10 INJECTION INTRAVENOUS at 09:19

## 2022-02-22 RX ADMIN — ONDANSETRON 4 MG: 2 INJECTION INTRAMUSCULAR; INTRAVENOUS at 09:05

## 2022-02-22 RX ADMIN — Medication 1000 MG: at 07:57

## 2022-02-22 RX ADMIN — MIDAZOLAM HYDROCHLORIDE 2 MG: 1 INJECTION, SOLUTION INTRAMUSCULAR; INTRAVENOUS at 08:09

## 2022-02-22 RX ADMIN — CEFAZOLIN SODIUM 2000 MG: 10 INJECTION, POWDER, FOR SOLUTION INTRAVENOUS at 09:25

## 2022-02-22 RX ADMIN — Medication 100 MCG: at 10:00

## 2022-02-22 RX ADMIN — Medication 0.2 MG: at 09:37

## 2022-02-22 RX ADMIN — SODIUM CHLORIDE: 9 INJECTION, SOLUTION INTRAVENOUS at 07:57

## 2022-02-22 RX ADMIN — Medication 200 MCG: at 09:48

## 2022-02-22 RX ADMIN — Medication 100 MCG: at 09:37

## 2022-02-22 RX ADMIN — Medication 3 MG: at 11:13

## 2022-02-22 RX ADMIN — PROPOFOL 130 MG: 10 INJECTION, EMULSION INTRAVENOUS at 09:19

## 2022-02-22 RX ADMIN — FENTANYL CITRATE 100 MCG: 50 INJECTION, SOLUTION INTRAMUSCULAR; INTRAVENOUS at 09:19

## 2022-02-22 RX ADMIN — SODIUM CHLORIDE: 9 INJECTION, SOLUTION INTRAVENOUS at 10:11

## 2022-02-22 RX ADMIN — BUPIVACAINE 10 ML: 13.3 INJECTION, SUSPENSION, LIPOSOMAL INFILTRATION at 08:15

## 2022-02-22 RX ADMIN — Medication 0.2 MG: at 09:28

## 2022-02-22 RX ADMIN — Medication 100 MCG: at 09:26

## 2022-02-22 RX ADMIN — DEXAMETHASONE SODIUM PHOSPHATE 5 MG: 10 INJECTION INTRAMUSCULAR; INTRAVENOUS at 09:26

## 2022-02-22 RX ADMIN — LIDOCAINE HYDROCHLORIDE 20 MG: 10 INJECTION, SOLUTION EPIDURAL; INFILTRATION; INTRACAUDAL at 09:19

## 2022-02-22 RX ADMIN — BUPIVACAINE HYDROCHLORIDE 10 ML: 5 INJECTION, SOLUTION EPIDURAL; INTRACAUDAL; PERINEURAL at 08:15

## 2022-02-22 RX ADMIN — ACETAMINOPHEN 1000 MG: 500 TABLET ORAL at 07:57

## 2022-02-22 RX ADMIN — PHENYLEPHRINE HYDROCHLORIDE 100 MCG/MIN: 10 INJECTION INTRAVENOUS at 10:11

## 2022-02-22 ASSESSMENT — PULMONARY FUNCTION TESTS
PIF_VALUE: 2
PIF_VALUE: 21
PIF_VALUE: 21
PIF_VALUE: 20
PIF_VALUE: 3
PIF_VALUE: 21
PIF_VALUE: 21
PIF_VALUE: 18
PIF_VALUE: 20
PIF_VALUE: 20
PIF_VALUE: 21
PIF_VALUE: 18
PIF_VALUE: 18
PIF_VALUE: 21
PIF_VALUE: 21
PIF_VALUE: 19
PIF_VALUE: 22
PIF_VALUE: 20
PIF_VALUE: 20
PIF_VALUE: 21
PIF_VALUE: 28
PIF_VALUE: 20
PIF_VALUE: 18
PIF_VALUE: 21
PIF_VALUE: 21
PIF_VALUE: 20
PIF_VALUE: 21
PIF_VALUE: 18
PIF_VALUE: 20
PIF_VALUE: 21
PIF_VALUE: 20
PIF_VALUE: 21
PIF_VALUE: 3
PIF_VALUE: 21
PIF_VALUE: 25
PIF_VALUE: 20
PIF_VALUE: 21
PIF_VALUE: 20
PIF_VALUE: 1
PIF_VALUE: 21
PIF_VALUE: 20
PIF_VALUE: 21
PIF_VALUE: 20
PIF_VALUE: 22
PIF_VALUE: 20
PIF_VALUE: 1
PIF_VALUE: 2
PIF_VALUE: 21
PIF_VALUE: 20
PIF_VALUE: 1
PIF_VALUE: 21
PIF_VALUE: 19
PIF_VALUE: 20
PIF_VALUE: 21
PIF_VALUE: 20
PIF_VALUE: 20
PIF_VALUE: 21
PIF_VALUE: 21
PIF_VALUE: 19
PIF_VALUE: 21
PIF_VALUE: 18
PIF_VALUE: 20
PIF_VALUE: 20
PIF_VALUE: 21
PIF_VALUE: 20
PIF_VALUE: 20
PIF_VALUE: 21
PIF_VALUE: 19
PIF_VALUE: 20
PIF_VALUE: 21
PIF_VALUE: 26
PIF_VALUE: 19
PIF_VALUE: 20
PIF_VALUE: 21
PIF_VALUE: 20
PIF_VALUE: 18
PIF_VALUE: 21
PIF_VALUE: 4
PIF_VALUE: 1
PIF_VALUE: 18
PIF_VALUE: 20
PIF_VALUE: 1
PIF_VALUE: 20
PIF_VALUE: 20
PIF_VALUE: 21
PIF_VALUE: 19
PIF_VALUE: 21
PIF_VALUE: 21
PIF_VALUE: 20
PIF_VALUE: 21
PIF_VALUE: 1
PIF_VALUE: 2
PIF_VALUE: 21
PIF_VALUE: 20
PIF_VALUE: 21
PIF_VALUE: 20
PIF_VALUE: 18
PIF_VALUE: 21
PIF_VALUE: 2
PIF_VALUE: 20
PIF_VALUE: 18
PIF_VALUE: 23
PIF_VALUE: 20

## 2022-02-22 ASSESSMENT — PAIN SCALES - GENERAL
PAINLEVEL_OUTOF10: 0

## 2022-02-22 ASSESSMENT — PAIN DESCRIPTION - ORIENTATION: ORIENTATION: RIGHT

## 2022-02-22 ASSESSMENT — PAIN DESCRIPTION - LOCATION: LOCATION: SHOULDER

## 2022-02-22 ASSESSMENT — PAIN - FUNCTIONAL ASSESSMENT: PAIN_FUNCTIONAL_ASSESSMENT: 0-10

## 2022-02-22 NOTE — ANESTHESIA PROCEDURE NOTES
Peripheral Block    Patient location during procedure: pre-op  Start time: 2/22/2022 8:14 AM  End time: 2/22/2022 8:17 AM  Staffing  Performed: anesthesiologist   Anesthesiologist: Rigoberto Parmar MD  Preanesthetic Checklist  Completed: patient identified, IV checked, site marked, risks and benefits discussed, surgical consent, monitors and equipment checked, pre-op evaluation, timeout performed, anesthesia consent given, oxygen available and patient being monitored  Peripheral Block  Patient position: supine  Prep: ChloraPrep  Patient monitoring: cardiac monitor, continuous pulse ox, frequent blood pressure checks and IV access  Block type: Brachial plexus  Laterality: right  Injection technique: single-shot  Guidance: ultrasound guided  Local infiltration: bupivacaine  Infiltration strength: 0.5 %  Dose: 1 mL  Interscalene  Provider prep: sterile gloves and mask  Expiration date: 2/28/2026  Kit: [id number]  Local infiltration: bupivacaine  Needle  Needle type: combined needle/nerve stimulator   Needle gauge: 22 G  Needle length: 5 cm  Needle localization: ultrasound guidance  Needle insertion depth: 2 cm  Test dose: negative  Lot number: [id number]  Assessment  Injection assessment: negative aspiration for heme, no paresthesia on injection and local visualized surrounding nerve on ultrasound  Paresthesia pain: none  Slow fractionated injection: yes  Hemodynamics: stable  Medications Administered  Bupivacaine (MARCAINE) PF injection 0.5%, 10 mL  bupivacaine liposome (EXPAREL) injection 1.3%, 10 mL  Reason for block: post-op pain management and at surgeon's request

## 2022-02-22 NOTE — H&P
Update History & Physical    The patient's History and Physical of February 11, 2022 was reviewed with the patient and I examined the patient. There was no change. The surgical site was confirmed by the patient and me. Plan: The risks, benefits, expected outcome, and alternative to the recommended procedure have been discussed with the patient. Patient understands and wants to proceed with the procedure.      Electronically signed by Diannah Dakin, MD on 2/22/2022 at 7:25 AM

## 2022-02-22 NOTE — OP NOTE
Operative Report     Date of Procedure: 2/22/2022      Preop Diagnosis: Right Shoulder Rotator Cuff Tear (M75.121)     Postop Diagnosis: Right  Rotator Cuff Tear (M75.121)     Procedure:  Right Arthroscopic Rotator Cuff Repair (61251)     Surgeon: Edward Martinez MD     Assistant: Herlinda Rojas DO (PGY 4)    Anesthesia: General with right interscalene nerve block    Blood Loss: Minimal    Findings: Intact chondral surfaces, fraying of the superior labrum, intact biceps tendon, shallow upper border intrasubstance tear of the subscapularis, full-thickness tear of the supraspinatus and leading edge of the infraspinatus tendon. Implants: Arthrex 5.5 mm metal corkscrew anchors x2 and 4.75 mm bio composite self punching swivel lock anchors x2     Indications: Mark Anthony Adams is a 77 y.o. old female who presented with functionally limiting right shoulder pain despite non-operative measures. MRI confirmed the presence of a rotator cuff tear. Following a discussion with the patient regarding her treatment options, she elected to proceed with an arthroscopic right shoulder rotator cuff repair. she came to this decision after demonstrating a good understanding of our discussion. Risks, benefits, and alternatives were fully discussed. Postoperative limitations and limitations of the procedure were discussed in detail. The patient understood risks, alternatives, complications, & post-operative limitations and wishes to proceed. Operative Technique: Following appropriate identification of the patient and her operative extremity in the preoperative area, consent was reviewed with patient. Risks, benefits, and alternatives were discussed. All questions were answered. The anesthesia service administered a right interscalene nerve block. Patient was taken back to the OR and transferred onto the operative table. General anesthesia was administered and her airway was secured.  The patient was carefully positioned and secured mm bio composite swivelLock anchor. This resulted in a secure arthroscopic rotator cuff repair. The repair was then reevaluated and found to be stable through a full range of motion. The camera was placed back into the joint, confirming restoration of the rotator cuff insertion with an air tight seal.      Incisions were closed using a 3-0 prolene followed by a sterile dressing by way of 4x4 gauze and Tegaderm. Her arm was placed in a sling/abduction pillow. The patient was awoken, transferred to her bed and wheeled to recovery in stable condition.      Complications: None     Condition: Stable

## 2022-02-22 NOTE — ANESTHESIA POSTPROCEDURE EVALUATION
POST- ANESTHESIA EVALUATION       Pt Name: Amber Rodriguez  MRN: [id number]  [place]: 1955  Date of evaluation: 2/22/2022  Time:  12:56 PM      /80   Pulse 63   Temp 97.2 °F (36.2 °C) (Temporal)   Resp 16   Ht 5' 5\" (1.651 m)   Wt 168 lb (76.2 kg)   SpO2 96%   BMI 27.96 kg/m²      Consciousness Level  Awake  Cardiopulmonary Status  Stable  Pain Adequately Treated YES  Nausea / Vomiting  NO  Adequate Hydration  YES  Anesthesia Related Complications NONE      Electronically signed by Tejas Barbosa MD on 2/22/2022 at 12:56 PM       Department of Anesthesiology  Postprocedure Note    Patient: Amber Rodriguez  MRN: [id number]  YOB: 1955  Date of evaluation: 2/22/2022  Time:  12:55 PM     Procedure Summary     Date: 02/22/22 Room / Location: [facility] OR 01 / [address]    Anesthesia Start: 4391 Anesthesia Stop: 5423    Procedure: RIGHT SHOULDER ARTHROSCOPY ROTATOR CUFF REPAIR AND BICEPS TENODESIS WITH ARTHREX AND PRE OP INTERSCALENE BLOCK WITH EXPERAL (Right ) Diagnosis: (RIGHT SHOUULDER ROTATOR CUFF TEAR AND BICEPS  TENDONITIS)    Surgeons: Beulah Crews MD Responsible Provider: Tejas Barbosa MD    Anesthesia Type: general ASA Status: 3          Anesthesia Type: general    Mari Phase I: Mari Score: 10    Mari Phase II: Mari Score: 10    Last vitals: Reviewed and per EMR flowsheets.        Anesthesia Post Evaluation

## 2022-02-22 NOTE — ANESTHESIA PRE PROCEDURE
Department of Anesthesiology  Preprocedure Note       Name:  Hanh Marte   Age:  77 y.o.  :  1955                                          MRN:  [id number]         Date:  2022      Surgeon: Richi Maya):  Amada Carpenter MD    Procedure: Procedure(s):  RIGHT SHOULDER ARTHROSCOPY ROTATOR CUFF REPAIR AND BICEPS TENODESIS WITH ARTHREX AND PRE OP INTERSCALENE BLOCK WITH EXPERAL    Medications prior to admission:   Prior to Admission medications    Medication Sig Start Date End Date Taking? Authorizing Provider   Naproxen Sodium (ALEVE PO) Take by mouth   Yes Historical Provider, MD   metFORMIN (GLUCOPHAGE) 1000 MG tablet Take 1,000 mg by mouth 2 times daily (with meals)   Yes Historical Provider, MD   losartan (COZAAR) 25 MG tablet Take 1 tablet by mouth daily 22  Yes Kalen Preston MD   mirtazapine (REMERON) 30 MG tablet Take 1 tablet by mouth nightly 22  Yes Kalen Preston MD   triamcinolone (KENALOG) 0.1 % cream Apply topically 2 times daily. 21  Yes Kalen Preston MD   gabapentin (NEURONTIN) 600 MG tablet Take 2 tablets by mouth nightly for 90 days.  21 Yes Kalen Preston MD   rosuvastatin (CRESTOR) 10 MG tablet rosuvastatin 10 mg tablet 21  Yes Kalen Preston MD   montelukast (SINGULAIR) 10 MG tablet Take 1 tablet by mouth nightly 21  Yes Kalen Preston MD   omeprazole (PRILOSEC) 40 MG delayed release capsule Take 1 capsule by mouth every morning (before breakfast) 1hr. Before eat breakfast 21  Yes Kalen Preston MD   fluticasone-salmeterol (ADVAIR DISKUS) 100-50 MCG/DOSE diskus inhaler Inhale 1 puff into the lungs 2 times daily 21  Yes Kalen Preston MD   escitalopram (LEXAPRO) 5 MG tablet Take 1 tablet by mouth nightly 21  Yes Kalen Preston MD   fluticasone [facility]) 50 MCG/ACT nasal spray 2 sprays by Nasal route daily 21  Yes Kalen Preston MD   acyclovir (ZOVIRAX) 400 MG tablet Take 1 tablet by mouth 2 times daily 21 Yes Diana Palmer MD   BASAGLAR KWIKPEN 100 UNIT/ML injection pen 22 Units nightly  7/12/21  Yes Historical Provider, MD   NOVOLOG FLEXPEN 100 UNIT/ML injection pen Inject into the skin 3 times daily (before meals) Sliding sccale 7/12/21  Yes Historical Provider, MD   aspirin 81 MG chewable tablet Take 81 mg by mouth daily   Yes Historical Provider, MD   vitamin D 25 MCG (1000 UT) CAPS Take by mouth   Yes Historical Provider, MD   GLUCOSAMINE HCL PO Take by mouth   Yes Historical Provider, MD   HYDROcodone-acetaminophen (NORCO) 5-325 MG per tablet Take 1 tablet by mouth every 4 hours as needed for Pain for up to 7 days.  2/18/22 2/25/22  Ashlyn Vallecillo MD   ondansetron (ZOFRAN) 4 MG tablet Take 1 tablet by mouth daily as needed for Nausea or Vomiting 2/18/22   Ashlyn Vallecillo MD   Continuous Blood Gluc Sensor (FREESTYLE JUANI 2 SENSOR) MISC As directed, change every 14 days 6/14/21   Historical Provider, MD   ibuprofen (ADVIL;MOTRIN) 800 MG tablet Take 1 tablet by mouth 2 times daily as needed for Pain 11/29/21   Diana Palmer MD   BD PEN NEEDLE WIN 2ND GEN 32G X 4 MM MISC  6/3/21   Historical Provider, MD   acetaminophen (TYLENOL) 500 MG tablet Take 500 mg by mouth every 6 hours as needed for Pain    Historical Provider, MD       Current medications:    Current Facility-Administered Medications   Medication Dose Route Frequency Provider Last Rate Last Admin    dexamethasone (PF) (DECADRON) 10 MG/ML injection             0.9 % sodium chloride infusion   IntraVENous Continuous Yasmany Adams MD        lactated ringers infusion   IntraVENous Continuous Yasmany Adams MD        sodium chloride flush 0.9 % injection 10 mL  10 mL IntraVENous 2 times per day Yasmany Adams MD        sodium chloride flush 0.9 % injection 10 mL  10 mL IntraVENous PRN Yasmany Adams MD        0.9 % sodium chloride infusion  25 mL IntraVENous PRN Yasmany Adams MD        0.9 % sodium chloride infusion  25 mL IntraVENous PRN MD Juan Torres acetaminophen (TYLENOL) tablet 1,000 mg  1,000 mg Oral Once Nelly Grossman MD        ceFAZolin (ANCEF) 2000 mg in dextrose 5 % 50 mL IVPB  2,000 mg IntraVENous On Call to [address] [place], MD        dexamethasone (PF) (DECADRON) injection 10 mg  10 mg IntraVENous Once Nelly Grossman MD        sodium chloride flush 0.9 % injection 10 mL  10 mL IntraVENous 2 times per day Nelly Grossman MD        sodium chloride flush 0.9 % injection 10 mL  10 mL IntraVENous PRN Nelly Grossman MD        acetaminophen (TYLENOL) 500 MG tablet                Allergies: Allergies   Allergen Reactions    Latex     Seasonal     Penicillins Rash       Problem List:    Patient Active Problem List   Diagnosis Code    Type 2 diabetes mellitus without complication ([facility]) P26.5    On statin therapy Z79.899    Hypercholesterolemia E78.00    Vitamin D deficiency E55.9    Gastroesophageal reflux disease without esophagitis K21.9    Hypertension I10    Rotator cuff tendonitis, right M75.81    Osteoarthritis of right knee M17.11    Right shoulder pain M25.511    Right knee pain M25.561    Atopic dermatitis L20.9    Subluxation of tendon of long head of biceps S46.119A    Complete tear of right rotator cuff M75.121       Past Medical History:        Diagnosis Date    Asthma     seasonal    Diabetes mellitus ([address]     type 2    Hyperlipidemia     PONV (postoperative nausea and vomiting)        Past Surgical History:        Procedure Laterality Date    APPENDECTOMY  07/07/2016    laparoscopic    BREAST BIOPSY Left     BREAST SURGERY      b/l reduction    COLONOSCOPY      HYSTERECTOMY         Social History:    Social History     Tobacco Use    Smoking status: Never Smoker    Smokeless tobacco: Never Used   Substance Use Topics    Alcohol use:  No                                Counseling given: Not Answered      Vital Signs (Current):   Vitals:    02/22/22 0718   Weight: 168 lb (76.2 kg)   Height: 5' 5\" (1.651 m) BP Readings from Last 3 Encounters:   02/08/22 138/83   02/10/22 136/74   02/09/22 132/81       NPO Status: Time of last liquid consumption: 1730                        Time of last solid consumption: 2230                        Date of last liquid consumption: 02/21/22                        Date of last solid food consumption: 02/21/22    BMI:   Wt Readings from Last 3 Encounters:   02/22/22 168 lb (76.2 kg)   02/18/22 174 lb (78.9 kg)   02/08/22 169 lb (76.7 kg)     Body mass index is 27.96 kg/m². CBC:   Lab Results   Component Value Date    WBC 5.7 02/08/2022    RBC 4.64 02/08/2022    HGB 13.3 02/08/2022    HCT 41.7 02/08/2022    MCV 89.9 02/08/2022    RDW 13.7 02/08/2022     02/08/2022       CMP:   Lab Results   Component Value Date     02/08/2022    K 4.5 02/08/2022     02/08/2022    CO2 23 02/08/2022    BUN 19 02/08/2022    CREATININE 0.73 02/08/2022    GFRAA >60 02/08/2022    LABGLOM >60 02/08/2022    GLUCOSE 206 02/08/2022    PROT 6.7 11/26/2021    CALCIUM 9.8 02/08/2022    BILITOT 0.40 11/26/2021    ALKPHOS 95 11/26/2021    AST 23 11/26/2021    ALT 19 11/26/2021       POC Tests: No results for input(s): POCGLU, POCNA, POCK, POCCL, POCBUN, POCHEMO, POCHCT in the last 72 hours. Coags:   Lab Results   Component Value Date    PROTIME 11.7 07/06/2016    INR 1.1 07/06/2016    APTT 36.0 07/06/2016       HCG (If Applicable): No results found for: PREGTESTUR, PREGSERUM, HCG, HCGQUANT     ABGs: No results found for: PHART, PO2ART, FOO0WNL, NBU8JFS, BEART, L0MKYREK     Type & Screen (If Applicable):  No results found for: LABABO, LABRH    Drug/Infectious Status (If Applicable):  No results found for: HIV, HEPCAB    COVID-19 Screening (If Applicable): No results found for: COVID19        Anesthesia Evaluation  Patient summary reviewed and Nursing notes reviewed   history of anesthetic complications: PONV.   Airway: Mallampati: II  TM distance: >3 FB   Neck ROM: full  Mouth opening: > = 3 FB Dental: normal exam         Pulmonary:normal exam  breath sounds clear to auscultation  (+) asthma:                            Cardiovascular:  Exercise tolerance: no interval change,   (+) hypertension: no interval change, hyperlipidemia        Rhythm: regular  Rate: normal                    Neuro/Psych:   (+) psychiatric history:depression/anxiety              ROS comment: Diabetic neuropathy GI/Hepatic/Renal:   (+) GERD:,           Endo/Other:    (+) DiabetesType II DM, no interval change, , : arthritis:., .                 Abdominal:       Abdomen: soft. Vascular: negative vascular ROS. Other Findings:           Anesthesia Plan      general     ASA 3     (GA and interscalene nerve block)  Induction: intravenous. MIPS: Postoperative opioids intended and Prophylactic antiemetics administered. Anesthetic plan and risks discussed with patient. Plan discussed with CRNA.                 Willie Landrum MD   2/22/2022

## 2022-02-23 ENCOUNTER — TELEPHONE (OUTPATIENT)
Dept: ORTHOPEDIC SURGERY | Age: 67
End: 2022-02-23

## 2022-02-23 NOTE — TELEPHONE ENCOUNTER
DEVINM with patient stating that she can try eating prior to taking the aspirin to see if that will alleviate the heartburn. Also recommended that she stop taking the aspirin if it continues to give her heartburn even with taking with meals.  Dr. Constantino Dominguez recommends taking aspirin for the 1st 2 weeks following sx to help prevent blood clots

## 2022-02-23 NOTE — TELEPHONE ENCOUNTER
Pt called in stating the aspirin 81 MG chewable tablet has been giving her heart burn and pt is wondering if dr Loraine Weber suggest anything else for pt.  Please contact pt at [phone]

## 2022-03-07 ENCOUNTER — PATIENT MESSAGE (OUTPATIENT)
Dept: FAMILY MEDICINE CLINIC | Age: 67
End: 2022-03-07

## 2022-03-08 RX ORDER — GABAPENTIN 600 MG/1
1200 TABLET ORAL NIGHTLY
Qty: 180 TABLET | Refills: 1 | Status: SHIPPED | OUTPATIENT
Start: 2022-03-08 | End: 2022-06-06 | Stop reason: SDUPTHER

## 2022-03-08 NOTE — TELEPHONE ENCOUNTER
From: Sandra Choi  To: Dr. eGe Blanchard: 3/7/2022 9:27 PM EST  Subject: Gabapentin 600mg 2 qhs    Please refill the above prescription. Pharmacy states no refills until 11/28/2022. Please refill for 90 days. Any questions, please call me at [phone]. Thank you!   Armando Patino

## 2022-03-09 ENCOUNTER — OFFICE VISIT (OUTPATIENT)
Dept: ORTHOPEDIC SURGERY | Age: 67
End: 2022-03-09

## 2022-03-09 VITALS — WEIGHT: 168 LBS | HEIGHT: 65 IN | BODY MASS INDEX: 27.99 KG/M2

## 2022-03-09 DIAGNOSIS — Z98.890 STATUS POST RIGHT ROTATOR CUFF REPAIR: Primary | ICD-10-CM

## 2022-03-09 PROCEDURE — 99024 POSTOP FOLLOW-UP VISIT: CPT | Performed by: ORTHOPAEDIC SURGERY

## 2022-03-09 NOTE — PROGRESS NOTES
Procedure: Right shoulder arthroscopic rotator cuff repair  Date of procedure: 2/22/22    HPI:  Nikia Zimmerman is a 77 y.o. female who is approximately 2 weeks status post aforementioned procedure. Indicates that she is doing relatively well. She pain is rated as a 3/10. She denies having any fevers, chills, sweats or any constitutional symptoms. She has been compliant with her pendulums and sling immobilization. Physical examination:  Evaluation of patient's right shoulder and upper extremity demonstrates her incisions to be clean, dry, intact. There is no warmth, erythema, wound dehiscence or drainage. Sensation is grossly intact light touch in all dermatomes and she has a 2+ radial pulse with brisk capillary refill in her fingers. Impression and plan:  Nikia Zimmerman is a 77 y.o. female who is 2 weeks status post an arthroscopic right shoulder rotator cuff repair. She is doing relatively well at this time. We reviewed her arthroscopic images. Her sutures were taken out and Steri-Strips and clean dressings applied. She may now get her incisions wet in the shower. She is to continue on with her immobilization. We'll get her started in formal physical therapy using rehab guidelines for the repair of a medium rotator cuff tear; a prescription was provided. I'll see her back for reevaluation in 4 weeks but she was encouraged to return or call earlier with questions and/or concerns.

## 2022-03-14 ENCOUNTER — HOSPITAL ENCOUNTER (OUTPATIENT)
Dept: PHYSICAL THERAPY | Facility: CLINIC | Age: 67
Setting detail: THERAPIES SERIES
Discharge: HOME | End: 2022-03-14
Payer: MEDICARE

## 2022-03-14 PROCEDURE — 97161 PT EVAL LOW COMPLEX 20 MIN: CPT

## 2022-03-14 PROCEDURE — 97016 VASOPNEUMATIC DEVICE THERAPY: CPT

## 2022-03-14 PROCEDURE — 97110 THERAPEUTIC EXERCISES: CPT

## 2022-03-14 NOTE — PRE-CERTIFICATION NOTE
Medicare Cap   [x] Physical Therapy  [] Speech Therapy  [] Occupational therapy  *PT and Speech caps combine      $2150 Limit for PT and Speech combined  $2150 Limit for OT individually  At the beginning of the month where you expect to go over $2150, please add the [address] modifier      Patient Name: Le Ramirez: 1955    Note:  This is an estimate of charges billed.      Date of [address] Name # units/ charge $$$ charge Daily Total Charge Ongoing Total $$$   3/14/22 sarah Reno 1/1 98.01+22.84+9.21 130.06 130.06

## 2022-03-14 NOTE — CONSULTS
[address]  [address] [facility]  Phone: [phone]  Fax: [phone]       Physical Therapy Upper Extremity Evaluation    Date:  3/14/2022  Patient: Jagdeep Beaulieu  : 1955  MRN: [id number]  Physician: Jinny Vann MD  Insurance: MEDICARE ([employer]  Medical Diagnosis: H56.530 - Status post right rotator cuff repair    Rehab Codes: U15.421, M25.611  Onset Date: DOS (22)    Next 's appt. : 22    Subjective:   CC/HPI: Pt reports to PT s/p right arthroscopic rotator cuff repair on 22. Since surgery, pt reports that things have been going pretty well. Per pt, she states that her pain has not been too bad, occurring mostly at night which can make sleeping difficult. Pt denies any numbness/tingling in her arm or hand. Pt notes that she has been doing her exercises given by her physician without any complaints.     PMHx:     [] Unremarkable             [x] Refer to full medical chart  In EPIC     Tests: [] X-Ray:   [] MRI:   [] Other:      Comorbidities:   [] Obesity [] Dialysis  [] N/A   [] Asthma/COPD [] Dementia [x] Other: Diabetes   [] Stroke [] Sleep apnea [] Other:   [] Vascular disease [] Rheumatic disease [] Other:       ADL/IADL [x] Previously independent with all [] Currently independent with all Who currently assists the patient with task     [] Previously independent with all except: [x] Currently independent with all except:     Bathing  [] Assist [] Assist     Dress/grooming [] Assist [] Assist     Transfer/mobility [] Assist [] Assist     Feeding [] Assist [] Assist     Toileting [] Assist [] Assist     Driving [] Assist [x] Assist     Housekeeping [] Assist [x] Assist     Grocery shop/meal prep [] Assist [x] Assist         Gait Prior level of function Current level of function    [x] Independent  [] Assist [x] Independent  [] Assist   Device: [x] Independent [x] Independent    [] Straight Cane [] Quad cane [] Straight Cane [] Quad cane    [] Standard walker [] Rolling walker   [] 4 wheeled walker [] Standard walker [] Rolling walker   [] 4 wheeled walker    [] Wheelchair [] Wheelchair       Medications: [x] Refer to full medical record [] None [] Other:  Allergies:      [x] Refer to full medical record [] None [] Other:    Function:  Hand Dominance  [x] Right  [] Left  Marital Status    Home type    Stairs from outside    Stairs inside    [address] status --   Work Activities/duties  --   Recreational Activities --       Pain present?  Yes   Location R shoulder   Pain Rating currently 2/10   Pain at worse 6/10   Pain at best 1/10   Description of pain Dull, ache   Altered Sensation Denies   What makes it worse Positions   What makes it better Medications   Symptom progression Gotten better   Sleep Sleep can be affected              Objective:      STRENGTH    Left Right   C5 Shld Abd 4/5 --   Shld Flexion 4/5 --   Shld IR 4/5 --   Shld ER 4/5 --   Shld HAB     Shld Ext     C6 Elb Flex 4+/5 --   C7 Elb Ext 4+/5 --   C8 EPL     T1 Fing Abd                    Prone:     Retraction     Depression     IR     Abd     Scaption     Flexion                    AROM PROM    Left Right Left Right   Shld Abd 168 -- -- 111   Shld Flex 166 -- -- 135   Shld IR T5 -- -- 30   Shld ER T5 -- -- 22   Shld HAB                     Elbow Flex 146  --    Elbow Ext 0  --    Supination       Pronation              Wrist flex        Wrist ext       Wrist UD       Wrist RD                          ROM   Cervical    Flexion WNL   Extension WNL   Rotation L- WNL R- WNL   Sidebend L- WNL R- WNL   Retraction            TESTS (+/-) LEFT RIGHT Not Tested   Vertebral A   [x]   CRLF    x   Speeds   [x]   Neers   [x]   Brice   [x]   Empty Can   [x]   Drop Arm   [x]   Post Apprehension   [x]   Ant Apprehension    [x]   Clunk   [x]   Sulcus   [x]   Elbow varus/valgus   [x]      []      []      []       OBSERVATION No Deficit Deficit Not Tested Comments   Posture       Forward Head [x] [] []    Rounded Shoulders [] [x] []    Kyphosis [x] [] []    Lordosis [x] [] []    Lateral Shift [] [] []    Scoliosis [] [] []    Iliac Crest [] [] []    PSIS [] [] []    ASIS [] [] []    Genu Valgus [] [] []    Genu Varus [] [] []    Genu Recurvatum [] [] []    Pronation [] [] []    Supination [] [] []    Leg Length Discrp [] [] []    Slumped Sitting [x] [] []    Palpation [] [x] [] Pt reports tenderness along R inferior middle clavicle   Sensation [x] [] [] No deficits with testing   Edema [] [] [x]    Neurological [] [] [x]        Flexibility Normal LUE Deficit RUE Deficit   UTrap [] [x] [x]   L.Scap [] [x] [x]   Scalenes  [] [] []   Pec Major [] [] [x]   Pec Minor [] [] [x]   Lats [] [] [x]   Supinators [] [] []   Pronators [] [] []   Other:                            FUNCTION Normal Difficult Unable   Sitting [x] [] []   Standing [x] [] []   Ambulation [x] [] []   Groom/Dress [x] [] []   Lift/Carry [] [] [x]   Stairs [x] [] []   Bending [x] [] []   OH reach [] [] [x]   Sit to Stand [x] [] []       Functional Test: UEFS Score: 82% functionally impaired                                            [place] Fall Risk Assessment    Patient Name:  Sandra Morse  : 1955      Risk Factor Scale  Score   History of Falls [] Yes  [x] No 25  0 0   Secondary Diagnosis [] Yes  [x] No 15  0 0   Ambulatory Aid [] Furniture  [] Crutches/cane/walker  [x] None/bedrest/wheelchair/nurse 30  15  0 0   IV/Heparin Lock [] Yes  [x] No 20  0 0   Gait/Transferring [] Impaired  [] Weak  [x] Normal/bedrest/immobile 20  10  0 0   Mental Status [] Forgets limitations  [x] Oriented to own ability 15  0 0      Total: 0     Based on the Assessment score: check the appropriate box.     [x]  No intervention needed   Low =   Score of 0-24    []  Use standard prevention interventions Moderate =  Score of 24-44   [] Give patient handout and discuss fall prevention strategies   [] Establish goal of education for patient/family RE: fall prevention strategies    []  Use high risk prevention interventions High = Score of 45 and higher   [] Give patient handout and discuss fall prevention strategies   [] Establish goal of education for patient/family Re: fall prevention strategies   [] Discuss lifeline / other resources    Electronically signed by: Belkis Benedict PT  Date: 3/14/2022          Assessment:    Patient would benefit from skilled physical therapy services in order to: Progress R shoulder strength, ROM, and functional use following R RTC repair    Problems:    [x] ? Pain:  [x] ? ROM:  [x] ? Strength:  [x] ? Function:  [] Other:         STG: (to be met in 10 treatments)  1. ? Pain: Pt to decrease pain levels to 4/10 to help improve tolerance to progression through ROM. 2. Pt will self-report better tolerance to sleeping to help improve quality of sleep. 3. Independent with Home Exercise Programs    LTG: (to be met in 20 treatments)  1. Improve score of UEFS from 82% impaired to <50% impaired, demonstrating improved functional use of R UE.  2. ? ROM: Increase R UE PROM to full in all planes, allowing for transition to full AROM. 3. Decrease pain to 0/10    LT treatments  1. Pt will demonstrate R AROM equal to L to reduce need for compensations with functional use  2. Pt will demonstrate R shoulder strength to 4+/5 grossly, reducing need for compensations with use of R UE.  3. Pt will demonstrate ability to complete all overhead motion with R UE without any compensations with UT, improve mechanics with completion. 4. Improve score of UEFS from 82% impaired to <30% impaired, demonstrating improved functional use of R UE.     Patient goals: \"Regain movement without pain\"    Rehab Potential:  [x] Good  [] Fair  [] Poor   Suggested Professional Referral:  [x] No  [] Yes:  Barriers to Goal Achievement[de-identified]  [x] No  [] Yes:  Domestic Concerns:  [x] No  [] Yes:    Pt. Education:  [x] Plans/Goals, Risks/Benefits discussed  [x] Home exercise program  Method of Education: [x] Verbal  [x] Demo  [x] Written  Access Code: [email or web address]  URL: [email or web address]  Date: 03/14/2022  [facility]    Exercises  Supine Shoulder External Rotation with Dowel - 3 x daily - 7 x weekly - 10 reps - 10 seconds hold  Standing Elbow Flexion Extension AROM - 3 x daily - 7 x weekly - 2 sets - 10 reps  Wrist Extension AROM - 3 x daily - 7 x weekly - 2 sets - 10 reps  Wrist Flexion AROM - 3 x daily - 7 x weekly - 2 sets - 10 reps  Hand AROM Composite Flexion - 3 x daily - 7 x weekly - 2 sets - 10 reps - 5 seconds hold  Circular Shoulder Pendulum with Table Support - 3 x daily - 7 x weekly - 1 sets - 25 reps  Horizontal Shoulder Pendulum with Table Support - 3 x daily - 7 x weekly - 1 sets - 25 reps    Comprehension of Education:  [x] Verbalizes understanding. [x] Demonstrates understanding. [x] Needs Review. [] Demonstrates/verbalizes understanding of HEP/Ed previously given. Treatment Plan:  [x] Therapeutic Exercise    [] Modalities:  [x] Therapeutic Activity    [] Ultrasound  [] Electrical Stimulation  [] Gait Training     [] Lumbar/Cervical Traction  [x] Neuromuscular Re-education [x] Cold/hotpack [] Iontophoresis: 4 mg/mL  [x] Instruction in HEP              Dexamethasone Sodium Phosphate 40-80 mAmin  [x] Manual Therapy             []  Aquatic Therapy       [x] Vasocompression: Dorothye Efren  [] Other:    []  Medication allergies reviewed for use of    Dexamethasone Sodium Phosphate 4mg/ml     with iontophoresis treatments. Pt is not allergic.     Frequency:  2-3 x/week for 20 visits      Todays Treatment:  Precautions: No active Lifting, Pushing, Pulling     Exercises: Utilize medium RTC repair protocol  Exercise  R RTC Repair    S/P week 2 (Tu)   Reps/ Time Weight/ Level Comments               Pendulums      Hand squeezes 2x10, 5\"     Wrist AROM 2x10     Elbow flex/ext 2x10           Supine wand ER 10x10\"                 PROM                        Other:      Specific Instructions for next treatment: Continue tx per POC    Evaluation Complexity:  History (Personal factors, comorbidities) [] 0 [x] 1-2 [] 3+   Exam (limitations, restrictions) [x] 1-2 [] 3 [] 4+   Clinical presentation (progression) [] Stable [x] Evolving  [] Unstable   Decision Making [x] Low [] Moderate [] High    [x] Low Complexity [] Moderate Complexity [] High Complexity       Treatment Charges: Mins Units   [x] Evaluation       [x]  Low       []  Moderate       []  High 29 1   []  Modalities     [x]  Ther Exercise 12 1   []  Manual Therapy     []  Ther Activities     []  Aquatics     [x]  Vasocompression 10 1   []  Other       TOTAL TREATMENT TIME: 51    Time in: 1000       Time out: 1102    Electronically signed by: Marcio Mckeon PT        Physician Signature:________________________________Date:__________________  By signing above or cosigning this note, I have reviewed this plan of care and certify a need for medically necessary rehabilitation services.      *PLEASE SIGN ABOVE AND FAX BACK ALL PAGES*

## 2022-03-16 ENCOUNTER — HOSPITAL ENCOUNTER (OUTPATIENT)
Dept: PHYSICAL THERAPY | Facility: CLINIC | Age: 67
Setting detail: THERAPIES SERIES
Discharge: HOME | End: 2022-03-16
Payer: MEDICARE

## 2022-03-16 PROCEDURE — 97110 THERAPEUTIC EXERCISES: CPT

## 2022-03-16 PROCEDURE — 97140 MANUAL THERAPY 1/> REGIONS: CPT

## 2022-03-16 PROCEDURE — 97016 VASOPNEUMATIC DEVICE THERAPY: CPT

## 2022-03-16 NOTE — FLOWSHEET NOTE
[] [facility]) [facility] CENTER &  Therapy  [address]  [phone]  F: [phone] [] [address]  [facility]   [address]  P: [phone]  F: [phone] [] [address]  [address]  P: [phone]  F: [phone] [] [address]  P: [phone]  F: [phone] [] [address]  [facility]   [address]   [place]: [phone]  F: [phone]      Physical Therapy Daily Treatment Note    Date:  3/16/2022  Patient Name:  Linda Barbosa    :  1955  MRN: [id number]   Physician: Janna Teresa MD                   Insurance: MEDICARE ([employer]  Medical Diagnosis: Q94.010 - Status post right rotator cuff repair                Rehab Codes: R13.327, M25.611  Onset Date: DOS (22)                                       Next 's appt. : 22     Visit# / total visits:  (2-3x wk)                  Progress note for Medicare patient due at visit      Cancels/No Shows:     Subjective:  Pt stated is sore and feels like she over did it yesterday with house work. 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 Pain:  [x] Yes  [] No Location: R shoulder  Pain Rating: (0-10 scale) 5/10  Pain altered Tx:  [] No  [] Yes  Action:  Comments:    Subjective: from eval 3/14/22  CC/HPI: Pt reports to PT s/p right arthroscopic rotator cuff repair on 22. Since surgery, pt reports that things have been going pretty well. Per pt, she states that her pain has not been too bad, occurring mostly at night which can make sleeping difficult. Pt denies any numbness/tingling in her arm or hand.  Pt notes that she has been doing her exercises given by her physician without any complaints.     6 weeks (3/29/22): PROM abduction, iso ER/IR, Rows (4-6),shrugs (4-6)   Objective:  Modalities: vaso 15' low compression. Precautions: No active Lifting, Pushing, Pulling                                   Exercises: Utilize medium RTC repair protocol  Exercise  R RTC Repair     S/P week 2 (Tu)    Reps/ Time Weight/ Level Comments                         Pendulums  20    forward/back, lateral, circles x   Hand squeezes 2x10, 5\"     x   Wrist ext/flex s 3x30\"   x   Wrist ext,flex, rad dev 2x10   shoulder supported to isolate forwar/wrist x   Sup/pro AROM 2x10   x   Elbow flex/ext 2x10     x   Scapula retraction    next    Shrugs    next               Supine wand ER 10x10\"     x                         PROM  10'    ER/flexion/IR only until 6 weeks x                                    Other:        Specific Instructions for next treatment: Continue tx per POC      Treatment Charges: Mins Units   []  Modalities     [x]  Ther Exercise 25 2   [x]  Manual Therapy 10 1   []  Ther Activities     []  Aquatics     [x]  Vasocompression 15 1   []  Other     Total Treatment time 50 4       Assessment: [x] Progressing toward goals. Initiated tx with seated elbow flexion/ext with cues in maintain posture and body mechanics with no increase in pain. Added wrist stretches and sup/pro to avoid muscle tension during routine healing. Pt was given 1# weight in wrist flexion, ext, and radial dev with shoulder supported to isolate forearm muscles and ensure no motion from shoulder with good carryover post. Pt required re-ed and cues in performing pendulums d/t difficulty relaxing. Empty end feel in PROM shoulder flexion with \"pinching\" pain reported by pt, firm end feel in ER. Reviewed Dr Tammy Cooper protocol to pt to ensure compliance for proper healing. [] No change.      [] Other:  [] Patient would continue to benefit from skilled physical therapy services in order to:Progress R shoulder strength, ROM, and functional use following R RTC repair    STG: (to be met in 10 treatments)  1. ? Pain: Pt to decrease pain levels to 4/10 to help improve tolerance to progression through ROM. 2. Pt will self-report better tolerance to sleeping to help improve quality of sleep. 3. Independent with Home Exercise Programs     LTG: (to be met in 20 treatments)  1. Improve score of UEFS from 82% impaired to <50% impaired, demonstrating improved functional use of R UE.  2. ? ROM: Increase R UE PROM to full in all planes, allowing for transition to full AROM. 3. Decrease pain to 0/10     LT treatments  1. Pt will demonstrate R AROM equal to L to reduce need for compensations with functional use  2. Pt will demonstrate R shoulder strength to 4+/5 grossly, reducing need for compensations with use of R UE.  3. Pt will demonstrate ability to complete all overhead motion with R UE without any compensations with UT, improve mechanics with completion. 4. Improve score of UEFS from 82% impaired to <30% impaired, demonstrating improved functional use of R UE.     Patient goals: \"Regain movement without pain\"    Pt. Education:  [x] Yes  [] No  [] Reviewed Prior HEP/Ed  Method of Education: [x] Verbal  [x] Demo  [x] Written  Comprehension of Education:  [x] Verbalizes understanding. [x] Demonstrates understanding. [x] Needs review. [] Demonstrates/verbalizes HEP/Ed previously given. Plan: [x] Continue current frequency toward long and short term goals.     [] Specific Instructions for subsequent treatments:       Time In: 9:01am            Time Out: 10:00am    Electronically signed by:  Emely Dunaway PTA

## 2022-03-16 NOTE — PRE-CERTIFICATION NOTE
Medicare Cap   [x] Physical Therapy  [] Speech Therapy  [] Occupational therapy  *PT and Speech caps combine      $2150 Limit for PT and Speech combined  $2150 Limit for OT individually  At the beginning of the month where you expect to go over $2150, please add the [address] [place] [address] modifier      Patient Name: Mindi Jalloh: 1955    Note:  This is an estimate of charges billed.      Date of [address] Name # units/ charge $$$ charge Daily Total Charge Ongoing Total $$$   3/14/22 sarah Reno 1/1 98.01+22.84+9.21 130.06 130.06   3/16/22 2TE,man,vaso 2,1,1 29.21+22.84+21.34+9.21 82.60 212.66

## 2022-03-18 ENCOUNTER — OFFICE VISIT (OUTPATIENT)
Dept: ORTHOPEDIC SURGERY | Age: 67
End: 2022-03-18
Payer: MEDICARE

## 2022-03-18 ENCOUNTER — HOSPITAL ENCOUNTER (OUTPATIENT)
Dept: PHYSICAL THERAPY | Facility: CLINIC | Age: 67
Setting detail: THERAPIES SERIES
Discharge: HOME | End: 2022-03-18
Payer: MEDICARE

## 2022-03-18 DIAGNOSIS — M17.11 OSTEOARTHRITIS OF RIGHT KNEE, UNSPECIFIED OSTEOARTHRITIS TYPE: Primary | ICD-10-CM

## 2022-03-18 PROCEDURE — 97110 THERAPEUTIC EXERCISES: CPT

## 2022-03-18 PROCEDURE — 97016 VASOPNEUMATIC DEVICE THERAPY: CPT

## 2022-03-18 PROCEDURE — 20610 DRAIN/INJ JOINT/BURSA W/O US: CPT | Performed by: ORTHOPAEDIC SURGERY

## 2022-03-18 RX ORDER — BUPIVACAINE HYDROCHLORIDE 5 MG/ML
2 INJECTION, SOLUTION PERINEURAL ONCE
Status: COMPLETED | OUTPATIENT
Start: 2022-03-18 | End: 2022-03-18

## 2022-03-18 RX ORDER — LIDOCAINE HYDROCHLORIDE 10 MG/ML
2 INJECTION, SOLUTION INFILTRATION; PERINEURAL ONCE
Status: COMPLETED | OUTPATIENT
Start: 2022-03-18 | End: 2022-03-18

## 2022-03-18 RX ORDER — BETAMETHASONE SODIUM PHOSPHATE AND BETAMETHASONE ACETATE 3; 3 MG/ML; MG/ML
12 INJECTION, SUSPENSION INTRA-ARTICULAR; INTRALESIONAL; INTRAMUSCULAR; SOFT TISSUE ONCE
Status: COMPLETED | OUTPATIENT
Start: 2022-03-18 | End: 2022-03-18

## 2022-03-18 RX ADMIN — BETAMETHASONE SODIUM PHOSPHATE AND BETAMETHASONE ACETATE 12 MG: 3; 3 INJECTION, SUSPENSION INTRA-ARTICULAR; INTRALESIONAL; INTRAMUSCULAR; SOFT TISSUE at 16:10

## 2022-03-18 RX ADMIN — BUPIVACAINE HYDROCHLORIDE 10 MG: 5 INJECTION, SOLUTION PERINEURAL at 16:11

## 2022-03-18 RX ADMIN — LIDOCAINE HYDROCHLORIDE 2 ML: 10 INJECTION, SOLUTION INFILTRATION; PERINEURAL at 16:10

## 2022-03-18 NOTE — PROGRESS NOTES
Avery Jo M.D.            [address] [facility] [address] [facility],[address], [facility].           Dept Phone: [phone]           Dept Fax:  [address], [facility]          Dept Phone: [phone]           Dept Fax:  [phone]      Chief Compliant:  Chief Complaint   Patient presents with    Pain     Rt knee        History of Present Illness: This is a 77 y.o. female who presents to the clinic today for evaluation / follow up of right knee pain. Patient is a 70-year-old female who was been seen in the past was noted to have some moderate DJD right knee. She got injection last December to work out fairly well she like another 1 today. Review of Systems   Constitutional: Negative for fever, chills, sweats. Eyes: Negative for changes in vision, or pain. HENT: Negative for ear ache, epistaxis, or sore throat. Respiratory/Cardio: Negative for Chest pain, palpitations, SOB, or cough. Gastrointestinal: Negative for abdominal pain, N/V/D. Genitourinary: Negative for dysuria, frequency, urgency, or hematuria. Neurological: Negative for headache, numbness, or weakness. Integumentary: Negative for rash, itching, laceration, or abrasion. Musculoskeletal: Positive for Pain (Rt knee)       Physical Exam:  Constitutional: Patient is oriented to person, place, and time. Patient appears well-developed and well nourished. HENT: Negative otherwise noted  Head: Normocephalic and Atraumatic  Nose: Normal  Eyes: Conjunctivae and EOM are normal  Neck: Normal range of motion Neck supple. Respiratory/Cardio: Effort normal. No respiratory distress. Musculoskeletal: No new examination was carried out today. Please see prior dictation  Neurological: Patient is alert and oriented to person, place, and time. Normal strenght.  No sensory deficit. Skin: Skin is warm and dry  Psychiatric: Behavior is normal. Thought content normal.  Nursing note and vitals reviewed. Labs and Imaging:     XR taken today:  No results found. Orders Placed This Encounter   Procedures    WA ARTHROCENTESIS ASPIR&/INJ MAJOR JT/BURSA W/O US       Assessment and Plan:  1. Osteoarthritis of right knee, unspecified osteoarthritis type            This is a 77 y.o. female who presents to the clinic today for evaluation / follow up of DJD right knee. Past History:    Current Outpatient Medications:     gabapentin (NEURONTIN) 600 MG tablet, Take 2 tablets by mouth nightly for 90 days. , Disp: 180 tablet, Rfl: 1    ondansetron (ZOFRAN) 4 MG tablet, Take 1 tablet by mouth daily as needed for Nausea or Vomiting (Patient not taking: Reported on 3/9/2022), Disp: 20 tablet, Rfl: 0    Naproxen Sodium (ALEVE PO), Take by mouth, Disp: , Rfl:     Continuous Blood Gluc Sensor (FREESTYLE JUANI 2 SENSOR) MISC, As directed, change every 14 days, Disp: , Rfl:     metFORMIN (GLUCOPHAGE) 1000 MG tablet, Take 1,000 mg by mouth 2 times daily (with meals), Disp: , Rfl:     losartan (COZAAR) 25 MG tablet, Take 1 tablet by mouth daily, Disp: 90 tablet, Rfl: 1    mirtazapine (REMERON) 30 MG tablet, Take 1 tablet by mouth nightly, Disp: 90 tablet, Rfl: 1    triamcinolone (KENALOG) 0.1 % cream, Apply topically 2 times daily. , Disp: 453 g, Rfl: 0    ibuprofen (ADVIL;MOTRIN) 800 MG tablet, Take 1 tablet by mouth 2 times daily as needed for Pain, Disp: 60 tablet, Rfl: 1    rosuvastatin (CRESTOR) 10 MG tablet, rosuvastatin 10 mg tablet, Disp: 90 tablet, Rfl: 1    montelukast (SINGULAIR) 10 MG tablet, Take 1 tablet by mouth nightly, Disp: 90 tablet, Rfl: 1    omeprazole (PRILOSEC) 40 MG delayed release capsule, Take 1 capsule by mouth every morning (before breakfast) 1hr. Before eat breakfast, Disp: 90 capsule, Rfl: 1    fluticasone-salmeterol (ADVAIR DISKUS) 100-50 MCG/DOSE diskus inhaler, Inhale 1 puff into the lungs 2 times daily, Disp: 180 each, Rfl: 1    escitalopram (LEXAPRO) 5 MG tablet, Take 1 tablet by mouth nightly, Disp: 90 tablet, Rfl: 1    fluticasone (FLONASE) 50 MCG/ACT nasal spray, 2 sprays by Nasal route daily, Disp: 3 each, Rfl: 1    acyclovir (ZOVIRAX) 400 MG tablet, Take 1 tablet by mouth 2 times daily, Disp: 180 tablet, Rfl: 0    BD PEN NEEDLE WIN 2ND GEN 32G X 4 MM MISC, , Disp: , Rfl:     BASAGLAR KWIKPEN 100 UNIT/ML injection pen, 22 Units nightly , Disp: , Rfl:     NOVOLOG FLEXPEN 100 UNIT/ML injection pen, Inject into the skin 3 times daily (before meals) Sliding sccale, Disp: , Rfl:     aspirin 81 MG chewable tablet, Take 81 mg by mouth daily, Disp: , Rfl:     vitamin D 25 MCG (1000 UT) CAPS, Take by mouth, Disp: , Rfl:     GLUCOSAMINE HCL PO, Take by mouth, Disp: , Rfl:   Allergies   Allergen Reactions    Latex     Seasonal     Penicillins Rash     Social History     Socioeconomic History    Marital status:      Spouse name: Not on file    Number of children: Not on file    Years of education: Not on file    Highest education level: Not on file   Occupational History    Not on file   Tobacco Use    Smoking status: Never Smoker    Smokeless tobacco: Never Used   Vaping Use    Vaping Use: Never used   Substance and Sexual Activity    Alcohol use: No    Drug use: Never    Sexual activity: Not on file   Other Topics Concern    Not on file   Social History Narrative    Not on file     Social Determinants of Health     Financial Resource Strain: Low Risk     Difficulty of Paying Living Expenses: Not hard at all   Food Insecurity: No Food Insecurity    Worried About Running Out of Food in the Last Year: Never true    Len of Food in the Last Year: Never true   Transportation Needs: No Transportation Needs    Lack of Transportation (Medical): No    Lack of Transportation (Non-Medical):  No   Physical Activity:     Days of Exercise per Week: Not on file    Minutes of Exercise per Session: Not on file   Stress:     Feeling of Stress : Not on file   Social Connections:     Frequency of Communication with Friends and Family: Not on file    Frequency of Social Gatherings with Friends and Family: Not on file    Attends Evangelical Services: Not on file    Active Member of Clubs or Organizations: Not on file    Attends Club or Organization Meetings: Not on file    Marital Status: Not on file   Intimate Partner Violence:     Fear of Current or Ex-Partner: Not on file    Emotionally Abused: Not on file    Physically Abused: Not on file    Sexually Abused: Not on file   Housing Stability:     Unable to Pay for Housing in the Last Year: Not on file    Number of Places Lived in the Last Year: Not on file    Unstable Housing in the Last Year: Not on file     Past Medical History:   Diagnosis Date    Acid reflux     Asthma     seasonal    Depression     Diabetes mellitus ([facility]     type 2    Diabetic neuropathy ([facility]     Hyperlipidemia     Neuropathy     PONV (postoperative nausea and vomiting)      Past Surgical History:   Procedure Laterality Date    APPENDECTOMY  07/07/2016    laparoscopic    BREAST BIOPSY Left     BREAST SURGERY      b/l reduction    COLONOSCOPY      HYSTERECTOMY      SHOULDER ARTHROSCOPY Right 02/22/2022    RIGHT SHOULDER ARTHROSCOPY ROTATOR CUFF REPAIR AND BICEPS TENODESIS WITH ARTHREX AND PRE OP INTERSCALENE BLOCK WITH EXPERAL (Right )    SHOULDER ARTHROSCOPY Right 2/22/2022    RIGHT SHOULDER ARTHROSCOPY ROTATOR CUFF REPAIR AND BICEPS TENODESIS WITH ARTHREX AND PRE OP INTERSCALENE BLOCK WITH EXPERAL performed by Ashlyn Vallecillo MD at [address] [facility].     Family History   Problem Relation Age of Onset    Asthma Mother     High Blood Pressure Mother     Other Mother     Depression Mother     Heart Disease Father     High Cholesterol Father     High Blood Pressure Father     High Cholesterol Brother Plan  An informed verbal consent for the procedure was obtained and risks including, but not limited to: allergy to medications, injection, bleeding, stiffness of joint, recurrence of symptoms, loss of function, swelling, drainage, irrigation, need for surgery and pseudo-septic inflammation, were explained to the patient. Also, discussed was the potential for further injections, irrigation and debridement and surgery. Alternate means of treatment have also been discussed with the patient. Under sterile conditions patient's right knee was injected with lidocaine 2 cc bupivacaine 2 cc and Celestone 2 cc. She taught procedure well    Patient was given post injection instructions. Patient recovering from recent rotator cuff surgery. We will see her back here per request                  .      Provider Attestation:  Luis Lezama, personally performed the services described in this documentation. All medical record entries made by the scribe were at my direction and in my presence. I have reviewed the chart and discharge instructions and agree that the records reflect my personal performance and is accurate and complete. Bill Perales MD. 03/18/22      Please note that this chart was generated using voice recognition Dragon dictation software. Although every effort was made to ensure the accuracy of this automated transcription, some errors in transcription may have occurred.

## 2022-03-18 NOTE — FLOWSHEET NOTE
[] [facility]) [facility] CENTER &  Therapy  [address]  [phone]  F: [phone] [] [address]  [address]   [address]  P: [phone]  F: [phone] [] [place]  [address]  P: [phone]  F: [phone] [] [address]  P: [phone]  F: [phone] [] [address]  [facility]   [address]   [place]: [phone]  F: [phone]      Physical Therapy Daily Treatment Note    Date:  3/18/2022  Patient Name:  Nikia Zimmerman    :  1955  MRN: [id number]   Physician: David Patel MD                   Insurance: MEDICARE ([employer]  Medical Diagnosis: J78.611 - Status post right rotator cuff repair                Rehab Codes: X11.018, M25.611  Onset Date: DOS (22)                                       Next 's appt. : 22     Visit# / total visits: 3/20 (2-3x wk)                  Progress note for Medicare patient due at visit      Cancels/No Shows:     Subjective:  Pt stated R shoulder is sore from previous tx. Pt notes minimal pain. Pain:  [x] Yes  [] No Location: R shoulder  Pain Rating: (0-10 scale) 2-3/10  Pain altered Tx:  [] No  [] Yes  Action:  Comments:    Subjective: from eval 3/14/22  CC/HPI: Pt reports to PT s/p right arthroscopic rotator cuff repair on 22. Since surgery, pt reports that things have been going pretty well. Per pt, she states that her pain has not been too bad, occurring mostly at night which can make sleeping difficult. Pt denies any numbness/tingling in her arm or hand.  Pt notes that she has been doing her exercises given by her physician without any complaints.     6 weeks (3/29/22): PROM abduction, iso ER/IR, Rows (4-6),shrugs (4-6)   Objective:  Modalities: vaso 15' low compression. Precautions: No active Lifting, Pushing, Pulling                                   Exercises: Utilize medium RTC repair protocol  Exercise  R RTC Repair     S/P week 2 (Tu)    Reps/ Time Weight/ Level Comments                         Pendulums  20    forward/back, lateral, circles x   Hand squeezes 2x10, 5\"     x   Wrist ext/flex s 3x30\"   x   Wrist ext,flex, rad dev 2x10 1#  shoulder supported to isolate forwar/wrist x   Sup/pro AROM 2x10   x   Elbow flex/ext 2x10        Scapula retraction  2x10   x   Shrugs  2x10   x              Supine wand ER 10x10\"     x                         PROM  10'    ER/flexion/IR only until 6 weeks x                                    Other:        Specific Instructions for next treatment: Continue tx per POC      Treatment Charges: Mins Units   []  Modalities     [x]  Ther Exercise 40 3   []  Manual Therapy     []  Ther Activities     []  Aquatics     [x]  Vasocompression 15 1   []  Other     Total Treatment time 55 4       Assessment: [x] Progressing toward goals. Pt continued with therex from prev tx. Continued with AROM of distal joints and initiated scap squeezes and shoulder shrugs to improve strength of surrounding musculature. When performing PROM, pt needs cues ~50% of the time to eliminate active motion. Pt expresses some discomfort with wand ER as well as passive shoulder flexion. Passive ER and IR with a firm end feel while flexion has an empty end feel. [] No change. [] Other:  [] Patient would continue to benefit from skilled physical therapy services in order to:Progress R shoulder strength, ROM, and functional use following R RTC repair    STG: (to be met in 10 treatments)  1. ? Pain: Pt to decrease pain levels to 4/10 to help improve tolerance to progression through ROM. 2. Pt will self-report better tolerance to sleeping to help improve quality of sleep.   3. Independent with Home Exercise Programs     LTG: (to be met in 20 treatments)  1. Improve score of UEFS from 82% impaired to <50% impaired, demonstrating improved functional use of R UE.  2. ? ROM: Increase R UE PROM to full in all planes, allowing for transition to full AROM. 3. Decrease pain to 0/10     LT treatments  1. Pt will demonstrate R AROM equal to L to reduce need for compensations with functional use  2. Pt will demonstrate R shoulder strength to 4+/5 grossly, reducing need for compensations with use of R UE.  3. Pt will demonstrate ability to complete all overhead motion with R UE without any compensations with UT, improve mechanics with completion. 4. Improve score of UEFS from 82% impaired to <30% impaired, demonstrating improved functional use of R UE.     Patient goals: \"Regain movement without pain\"    Pt. Education:  [x] Yes  [] No  [] Reviewed Prior HEP/Ed  Method of Education: [x] Verbal  [x] Demo  [x] Written  Comprehension of Education:  [x] Verbalizes understanding. [x] Demonstrates understanding. [x] Needs review. [] Demonstrates/verbalizes HEP/Ed previously given. Plan: [x] Continue current frequency toward long and short term goals. [] Specific Instructions for subsequent treatments:       Time In: 11:05am            Time Out: 12:00pm    Electronically signed by:  ELDON Tran The above documentation completed by Sue Aponte, Student Physical Therapist Assistant, was reviewed and accepted by supervising Clinical Instructor Richard Del Real PTA.

## 2022-03-21 ENCOUNTER — HOSPITAL ENCOUNTER (OUTPATIENT)
Dept: PHYSICAL THERAPY | Facility: CLINIC | Age: 67
Setting detail: THERAPIES SERIES
Discharge: HOME | End: 2022-03-21
Payer: MEDICARE

## 2022-03-21 PROCEDURE — 97110 THERAPEUTIC EXERCISES: CPT

## 2022-03-21 PROCEDURE — 97016 VASOPNEUMATIC DEVICE THERAPY: CPT

## 2022-03-21 NOTE — PRE-CERTIFICATION NOTE
Medicare Cap   [x] Physical Therapy  [] Speech Therapy  [] Occupational therapy  *PT and Speech caps combine      $2150 Limit for PT and Speech combined  $2150 Limit for OT individually  At the beginning of the month where you expect to go over $2150, please add the [address] [place] [address] modifier      Patient Name: Khadra Spore: 1955    Note:  This is an estimate of charges billed.      Date of [address] Name # units/ charge $$$ charge Daily Total Charge Ongoing Total $$$   3/14/22 sarah Reno 1/1 98.01+22.84+9.21 130.06 130.06   3/16/22 2TE,man,vaso 2,1,1 29.21+22.84+21.34+9.21 82.60 212.66   3/18/22 3TE, man  29.21+22.84+22.84+21.34 96.23 308.89

## 2022-03-21 NOTE — FLOWSHEET NOTE
[] [facility]) [facility] &  Therapy  [address]  [phone]  F: [phone] [] [address]  Online-OR 36   [address]  P: [phone]  F: [phone] [x] [address] &  Therapy  [address]  P: [phone]  F: [phone] [] [address]  P: [phone]  F: [phone] [] [address]  [facility]   [address]   [place]: [phone]  F: [phone]      Physical Therapy Daily Treatment Note    Date:  3/21/2022  Patient Name:  Ayah Fields    :  1955  MRN: [id number]   Physician: Gisell Harrison MD                   Insurance: MEDICARE (Med. [employer]  Medical Diagnosis: U47.255 - Status post right rotator cuff repair                Rehab Codes: U21.412, M25.611  Onset Date: DOS (22)                                       Next 's appt. : 22   Visit# / total visits:  (2-3x wk)                       Cancels/No Shows: 0/0    Subjective:  Pt reports to PT with 4/10 pain, stating that she is a little sore today, but overall feeling well. Pain:  [x] Yes  [] No  Location: R shoulder  Pain Rating: (0-10 scale) 4/10  Pain altered Tx:  [x] No  [] Yes  Action:  Comments:       6 weeks (3/29/22): PROM abduction, iso ER/IR, Rows (4-6),shrugs (4-6)   Objective:  Modalities: vaso 15' low compression.    Precautions: No active Lifting, Pushing, Pulling                                   Exercises: Utilize medium RTC repair protocol  Exercise  R RTC Repair     S/P week 2 ()    Reps/ Time Weight/ Level Comments                         Pendulums  20    forward/back, lateral, circles x   Hand squeezes 2x12, 5\"     x   Wrist ext/flex s 3x30\"      Wrist ext,flex, rad dev 2x10 1#  shoulder supported to isolate forwar/wrist Sup/pro AROM 2x10      Elbow flex/ext 2x10     x   Scapula retraction  2x10      Shrugs  2x10      UT S 3x30\"   x              Supine wand ER 10x10\"     x                         PROM  10'    ER/flexion/IR only until 6 weeks x                                    Other:        Specific Instructions for next treatment: Continue tx per POC      Treatment Charges: Mins Units   []  Modalities     [x]  Ther Exercise 31 2   []  Manual Therapy     []  Ther Activities     []  Aquatics     [x]  Vasocompression 15 1   []  Other     Total Treatment time 46 3       Assessment: [x] Progressing toward goals. Continued with tx per log above with no complaints. Completed all AROM and stretching exercises per chart with good recall throughout most of completion. Pt does require cueing with supine ER to avoid extending her elbow, good carryover. Completed PROM with pt still guarding initially, but was able to relax more as PROM continued to be completed. Ended with vaso for soreness/inflammation. [] No change. [] Other:  [x] Patient would continue to benefit from skilled physical therapy services in order to:Progress R shoulder strength, ROM, and functional use following R RTC repair    STG: (to be met in 10 treatments)  1. ? Pain: Pt to decrease pain levels to 4/10 to help improve tolerance to progression through ROM. 2. Pt will self-report better tolerance to sleeping to help improve quality of sleep. 3. Independent with Home Exercise Programs     LTG: (to be met in 20 treatments)  1. Improve score of UEFS from 82% impaired to <50% impaired, demonstrating improved functional use of R UE.  2. ? ROM: Increase R UE PROM to full in all planes, allowing for transition to full AROM. 3. Decrease pain to 0/10     LT treatments  1. Pt will demonstrate R AROM equal to L to reduce need for compensations with functional use  2.  Pt will demonstrate R shoulder strength to 4+/5 grossly, reducing need for compensations with use of R UE.  3. Pt will demonstrate ability to complete all overhead motion with R UE without any compensations with UT, improve mechanics with completion. 4. Improve score of UEFS from 82% impaired to <30% impaired, demonstrating improved functional use of R UE.     Patient goals: \"Regain movement without pain\"    Pt. Education:  [x] Yes  [] No  [x] Reviewed Prior HEP/Ed  Method of Education: [x] Verbal  [x] Demo  [] Written  Comprehension of Education:  [x] Verbalizes understanding. [x] Demonstrates understanding. [x] Needs review. [] Demonstrates/verbalizes HEP/Ed previously given. Plan: [x] Continue current frequency toward long and short term goals. [x] Specific Instructions for subsequent treatments: Continue tx per Protocol      Time In: 0900            Time Out: 8001    Electronically signed by:   Eunice Ratliff PT

## 2022-03-23 ENCOUNTER — HOSPITAL ENCOUNTER (OUTPATIENT)
Dept: PHYSICAL THERAPY | Facility: CLINIC | Age: 67
Setting detail: THERAPIES SERIES
Discharge: HOME | End: 2022-03-23
Payer: MEDICARE

## 2022-03-23 PROCEDURE — 97016 VASOPNEUMATIC DEVICE THERAPY: CPT

## 2022-03-23 PROCEDURE — 97110 THERAPEUTIC EXERCISES: CPT

## 2022-03-23 NOTE — FLOWSHEET NOTE
to isolate forwar/wrist x   Sup/pro AROM 2x10   x   Elbow flex/ext 2x10     x   Scapula retraction  2x10   x   Shrugs  2x10      UT S 3x30\"   x              Supine wand ER 10x10\"     x                         PROM  10'    ER/flexion/IR only until 6 weeks x                                    Other:        Specific Instructions for next treatment: Continue tx per POC      Treatment Charges: Mins Units   []  Modalities     [x]  Ther Exercise 35 2   []  Manual Therapy     []  Ther Activities     []  Aquatics     [x]  Vasocompression 15 1   []  Other     Total Treatment time 50 3       Assessment: [x] Progressing toward goals. Continued with tx per log maintaining compliant to protocol. Pt demo empty end feel in first shoulder flexion during PROM with reported \"achy\" pain but then demo improvement in range with reported \"good stretch\" in pain free range. Continues with restrictions in ER. Concluded tx with vaso for sxs relief and reported decrease in soreness post. Pt by request was given HEP for R knee ROM and strengthening. [] No change. [] Other:  [x] Patient would continue to benefit from skilled physical therapy services in order to:Progress R shoulder strength, ROM, and functional use following R RTC repair    STG: (to be met in 10 treatments)  1. ? Pain: Pt to decrease pain levels to 4/10 to help improve tolerance to progression through ROM. 2. Pt will self-report better tolerance to sleeping to help improve quality of sleep. 3. Independent with Home Exercise Programs     LTG: (to be met in 20 treatments)  1. Improve score of UEFS from 82% impaired to <50% impaired, demonstrating improved functional use of R UE.  2. ? ROM: Increase R UE PROM to full in all planes, allowing for transition to full AROM. 3. Decrease pain to 0/10     LT treatments  1. Pt will demonstrate R AROM equal to L to reduce need for compensations with functional use  2.  Pt will demonstrate R shoulder strength to 4+/5 grossly, reducing need for compensations with use of R UE.  3. Pt will demonstrate ability to complete all overhead motion with R UE without any compensations with UT, improve mechanics with completion. 4. Improve score of UEFS from 82% impaired to <30% impaired, demonstrating improved functional use of R UE.     Patient goals: \"Regain movement without pain\"    Pt. Education:  [x] Yes  [] No  [x] Reviewed Prior HEP/Ed  Method of Education: [x] Verbal  [x] Demo  [] Written  Comprehension of Education:  [x] Verbalizes understanding. [x] Demonstrates understanding. [x] Needs review. [] Demonstrates/verbalizes HEP/Ed previously given. Plan: [x] Continue current frequency toward long and short term goals.     [x] Specific Instructions for subsequent treatments: Continue tx per Protocol      Time In: 1391           Time Out: 10:00    Electronically signed by:  Dawson Villalobos PTA

## 2022-03-23 NOTE — PRE-CERTIFICATION NOTE
Medicare Cap   [x] Physical Therapy  [] Speech Therapy  [] Occupational therapy  *PT and Speech caps combine      $2150 Limit for PT and Speech combined  $2150 Limit for OT individually  At the beginning of the month where you expect to go over $2150, please add the [address] [place] [address] modifier      Patient Name: Jacob Serrato: 1955    Note:  This is an estimate of charges billed.      Date of [address] Name # units/ charge $$$ charge Daily Total Charge Ongoing Total $$$   3/14/22 sarah Reno 1/1 98.01+22.84+9.21 130.06 130.06   3/16/22 2TE,man,vaso 2,1,1 29.21+22.84+21.34+9.21 82.60 212.66   3/18/22 3TE, man  29.21+22.84+22.84+21.34 96.23 308.89   3/21/22 2TE, vaso 2,1 29.21+22.84+9.21 61.26 370.50   3/23/22 2TE,vaso 2,1  61.26 431.41

## 2022-03-25 ENCOUNTER — HOSPITAL ENCOUNTER (OUTPATIENT)
Dept: PHYSICAL THERAPY | Facility: CLINIC | Age: 67
Setting detail: THERAPIES SERIES
Discharge: HOME | End: 2022-03-25
Payer: MEDICARE

## 2022-03-25 PROCEDURE — 97016 VASOPNEUMATIC DEVICE THERAPY: CPT

## 2022-03-25 PROCEDURE — 97110 THERAPEUTIC EXERCISES: CPT

## 2022-03-25 NOTE — FLOWSHEET NOTE
[] [facility]) - [facility] &  Therapy  [address]  [phone]  F: [phone] [] [address]  [facility]   [address]  P: [phone]  F: [phone] [x] [address] &  Therapy  [address]  P: [phone]  F: [phone] [] [address]  P: [phone]  F: [phone] [] [address]  [facility]   [address]   [place]: [phone]  F: [phone]      Physical Therapy Daily Treatment Note    Date:  3/25/2022  Patient Name:  Bea Chatman    :  1955  MRN: [id number]   Physician: Ashlyn Vallecillo MD                   Insurance: MEDICARE ([employer]  Medical Diagnosis: R79.055 - Status post right rotator cuff repair                Rehab Codes: V28.760, M25.611  Onset Date: DOS (22)                                       Next 's appt. : 22   Visit# / total visits:  (2-3x wk)                       Cancels/No Shows: 0/0    Subjective:  Pt reports she is not having any pain this date but does experience stiffness in the mornings. States she has finally found a way to sleep comfortably. Pain:  [] Yes  [x] No  Location: R shoulder  Pain Rating: (0-10 scale) 0/10  Pain altered Tx:  [x] No  [] Yes  Action:  Comments:       6 weeks (3/29/22): PROM abduction, iso ER/IR, Rows (4-6),shrugs (4-6)   Objective:  Modalities: vaso 15' low compression.    Precautions: No active Lifting, Pushing, Pulling                                   Exercises: Utilize medium RTC repair protocol  Exercise  R RTC Repair     S/P week 2 ()    Reps/ Time Weight/ Level Comments                         Pendulums  20    forward/back, lateral, circles x   Hand squeezes 2x12, 5\"     x   Wrist ext/flex s 3x30\"   x   Wrist ext,flex, rad dev 2x15 1# shoulder supported to isolate forwar/wrist x   Sup/pro AROM 2x15   x   Elbow flex/ext 2x15     x   Scapula retraction  2x10   x   Shrugs  2x10   x   UT S 3x30\"   x              Supine wand ER 10x10\"     x                         PROM  10'    ER/flexion/IR only until 6 weeks                                     Other:        Specific Instructions for next treatment: Continue tx per POC      Treatment Charges: Mins Units   []  Modalities     [x]  Ther Exercise 35 2   []  Manual Therapy     []  Ther Activities     []  Aquatics     [x]  Vasocompression 15 1   []  Other     Total Treatment time 50 3       Assessment: [x] Progressing toward goals. Continued with tx per log maintaining compliant to protocol. Verbal cueing needed during wrist/elbow therex to eliminate any movement at the shoulder. Cues also given during wrist flexion/extension to eliminate any radial/ulnar deviation. Pt continues to show decreased ROM in ER. Some pain noted during first reps of PROM flexion that improves with more movement. Multiple verbal cues given to maintain during relaxation of shoulder during PROM. Concluded tx with vaso for sxs relief. Pt has been performing HEP for R knee that was given prior tx ans states she still has some soreness. [] No change. [] Other:  [x] Patient would continue to benefit from skilled physical therapy services in order to:Progress R shoulder strength, ROM, and functional use following R RTC repair    STG: (to be met in 10 treatments)  ? Pain: Pt to decrease pain levels to 4/10 to help improve tolerance to progression through ROM. Pt will self-report better tolerance to sleeping to help improve quality of sleep. Independent with Home Exercise Programs     LTG: (to be met in 20 treatments)  Improve score of UEFS from 82% impaired to <50% impaired, demonstrating improved functional use of R UE.  ? ROM: Increase R UE PROM to full in all planes, allowing for transition to full AROM.   Decrease pain to

## 2022-03-28 ENCOUNTER — HOSPITAL ENCOUNTER (OUTPATIENT)
Dept: PHYSICAL THERAPY | Facility: CLINIC | Age: 67
Setting detail: THERAPIES SERIES
Discharge: HOME | End: 2022-03-28
Payer: MEDICARE

## 2022-03-28 PROCEDURE — 97110 THERAPEUTIC EXERCISES: CPT

## 2022-03-28 PROCEDURE — 97016 VASOPNEUMATIC DEVICE THERAPY: CPT

## 2022-03-28 NOTE — FLOWSHEET NOTE
flex/ext 2x15     x   Scapula retraction  2x10   x   Shrugs  2x10   x   UT S 3x30\"   x              Supine wand ER 10x10\"     x              PROM  10'    ER/flexion/IR only until 6 weeks x                                    Other:        Specific Instructions for next treatment: Continue tx per POC      Treatment Charges: Mins Units   []  Modalities     [x]  Ther Exercise 35 2   []  Manual Therapy     []  Ther Activities     []  Aquatics     [x]  Vasocompression 15 1   []  Other     Total Treatment time 50 3       Assessment: [x] Progressing toward goals. Continued with tx per log maintaining compliant to protocol. Plan to progress pt within protocols. Cues given to assist with exercise recall and mechanics. Pt continues to require cueing during PROM and elbow therex to allow for relaxation of the shoulder. Pt has inc discomfort during wand and PROM ER. Tx concluded with vaso in order to reduce pain. [] No change. [] Other:  [x] Patient would continue to benefit from skilled physical therapy services in order to:Progress R shoulder strength, ROM, and functional use following R RTC repair    STG: (to be met in 10 treatments)  ? Pain: Pt to decrease pain levels to 4/10 to help improve tolerance to progression through ROM. Pt will self-report better tolerance to sleeping to help improve quality of sleep. Independent with Home Exercise Programs     LTG: (to be met in 20 treatments)  Improve score of UEFS from 82% impaired to <50% impaired, demonstrating improved functional use of R UE.  ? ROM: Increase R UE PROM to full in all planes, allowing for transition to full AROM. Decrease pain to 0/10     LT treatments  1. Pt will demonstrate R AROM equal to L to reduce need for compensations with functional use  2.  Pt will demonstrate R shoulder strength to 4+/5 grossly, reducing need for compensations with use of R UE.  3. Pt will demonstrate ability to complete all overhead motion with R UE without any compensations with UT, improve mechanics with completion. 4. Improve score of UEFS from 82% impaired to <30% impaired, demonstrating improved functional use of R UE. Patient goals: \"Regain movement without pain\"    Pt. Education:  [x] Yes  [] No  [x] Reviewed Prior HEP/Ed  Method of Education: [x] Verbal  [x] Demo  [] Written  Comprehension of Education:  [x] Verbalizes understanding. [x] Demonstrates understanding. [x] Needs review. [] Demonstrates/verbalizes HEP/Ed previously given. Plan: [x] Continue current frequency toward long and short term goals. [x] Specific Instructions for subsequent treatments: Continue tx per Protocol      Time In: 9:00 am          Time Out: 9:50 am    Electronically signed by:  ELDON Morocho The above documentation completed by Antonio Rogers, Student Physical Therapist Assistant, was reviewed and accepted by supervising Clinical Instructor Tanya Cabrera PTA.

## 2022-03-30 ENCOUNTER — HOSPITAL ENCOUNTER (OUTPATIENT)
Dept: PHYSICAL THERAPY | Facility: CLINIC | Age: 67
Setting detail: THERAPIES SERIES
Discharge: HOME | End: 2022-03-30
Payer: MEDICARE

## 2022-03-30 PROCEDURE — 97110 THERAPEUTIC EXERCISES: CPT

## 2022-03-30 PROCEDURE — 97016 VASOPNEUMATIC DEVICE THERAPY: CPT

## 2022-03-30 NOTE — FLOWSHEET NOTE
[] [facility]) [facility] &  Therapy  [address]  [phone]  F: [phone] [] [address]  [employer] 36   [address]  P: [phone]  F: [phone] [x] [address] &  Therapy  [address]  P: [phone]  F: [phone] [] [address]  P: [phone]  F: [phone] [] [address]  [facility]   [address]   [place]: [phone]  F: [phone]      Physical Therapy Daily Treatment Note    Date:  3/30/2022  Patient Name:  Linda Barbosa    :  1955  MRN: [id number]   Physician: Janna Teresa MD                   Insurance: MEDICARE ([employer]  Medical Diagnosis: Z68.528 - Status post right rotator cuff repair                Rehab Codes: B08.958, M25.611  Onset Date: DOS (22)                                       Next 's appt. : 22   Visit# / total visits:  (2-3x wk)                       Cancels/No Shows: 0/0    Subjective:  Pt reports she is not having any pain this date. Pt is continuing wit HEP daily. Pain:  [] Yes  [x] No  Location: R shoulder  Pain Rating: (0-10 scale) 0/10  Pain altered Tx:  [x] No  [] Yes  Action:  Comments:       6 weeks (22): PROM abduction, iso ER/IR, Rows ,shrugs    Objective:  Modalities: vaso 15' low compression.    Precautions: No active Lifting, Pushing, Pulling                                   Exercises: Utilize medium RTC repair protocol  Exercise  R RTC Repair     S/P week 2 ()    Reps/ Time Weight/ Level Comments                         Pendulums  2x20    forward/back, lateral, circles x   Hand squeezes 2x12, 5\"     x   Wrist ext/flex s 3x30\"   x   Wrist ext,flex, rad dev 2x15 1#  shoulder supported to isolate forwar/wrist x   Sup/pro AROM 2x15   x   Elbow flex/ext 2x15     x   Scapula retraction  2x15   x   Shrugs  2x15   x   UT S 3x30\"   x              Supine wand ER 10x10\"     x              PROM  10'    ER/flexion/IR only until 6 weeks x                                    Other:        Specific Instructions for next treatment: Continue tx per POC      Treatment Charges: Mins Units   []  Modalities     [x]  Ther Exercise 45 3   []  Manual Therapy     []  Ther Activities     []  Aquatics     [x]  Vasocompression 15 1   []  Other     Total Treatment time 60 4       Assessment: [x] Progressing toward goals. Continued with tx per log maintaining compliant to protocol. Repetitions inc for shoulder shrugs and scap retractions. Cues given to assist with exercise recall and mechanics. Pt continues to require cueing during PROM and elbow therex to allow for relaxation of the shoulder. Pt has inc discomfort during wand and PROM ER. Tx concluded with vaso in order to reduce pain. [] No change. [] Other:  [x] Patient would continue to benefit from skilled physical therapy services in order to:Progress R shoulder strength, ROM, and functional use following R RTC repair    STG: (to be met in 10 treatments)  1. ? Pain: Pt to decrease pain levels to 4/10 to help improve tolerance to progression through ROM. 2. Pt will self-report better tolerance to sleeping to help improve quality of sleep. 3. Independent with Home Exercise Programs     LTG: (to be met in 20 treatments)  1. Improve score of UEFS from 82% impaired to <50% impaired, demonstrating improved functional use of R UE.  2. ? ROM: Increase R UE PROM to full in all planes, allowing for transition to full AROM. 3. Decrease pain to 0/10     LT treatments  1. Pt will demonstrate R AROM equal to L to reduce need for compensations with functional use  2.  Pt will demonstrate R shoulder strength to 4+/5 grossly, reducing need for compensations with use of R UE.  3. Pt will demonstrate ability to complete all overhead motion with R UE without any compensations with UT, improve mechanics with completion. 4. Improve score of UEFS from 82% impaired to <30% impaired, demonstrating improved functional use of R UE. Patient goals: \"Regain movement without pain\"    Pt. Education:  [x] Yes  [] No  [x] Reviewed Prior HEP/Ed  Method of Education: [x] Verbal  [x] Demo  [] Written  Comprehension of Education:  [x] Verbalizes understanding. [x] Demonstrates understanding. [x] Needs review. [] Demonstrates/verbalizes HEP/Ed previously given. Plan: [x] Continue current frequency toward long and short term goals. [x] Specific Instructions for subsequent treatments: Continue tx per Protocol      Time In: 9:05 am          Time Out: 10:05 am    Electronically signed by:  ELDON Sanches The above documentation completed by Juan Gleason, Student Physical Therapist Assistant, was reviewed and accepted by supervising Clinical Instructor Angélica Pinto PTA.

## 2022-04-01 ENCOUNTER — HOSPITAL ENCOUNTER (OUTPATIENT)
Dept: PHYSICAL THERAPY | Facility: CLINIC | Age: 67
Setting detail: THERAPIES SERIES
Discharge: HOME | End: 2022-04-01
Payer: MEDICARE

## 2022-04-01 PROCEDURE — 97016 VASOPNEUMATIC DEVICE THERAPY: CPT

## 2022-04-01 PROCEDURE — 97110 THERAPEUTIC EXERCISES: CPT

## 2022-04-01 NOTE — FLOWSHEET NOTE
[] [facility]) [facility] &  Therapy  [address]  [phone]  F: [phone] [] [address]  [address] [facility]   [address]  P: [phone]  F: [phone] [x] [place]  [address]  P: [phone]  F: [phone] [] [address]  P: [phone]  F: [phone] [] [address]  [facility]   [address]   [place]: [phone]  F: [phone]      Physical Therapy Daily Treatment Note    Date:  2022  Patient Name:  Piero Vazquez    :  1955  MRN: [id number]   Physician: Mahamed Boyle MD                   Insurance: MEDICARE ([employer]. [employer]  Medical Diagnosis: K84.459 - Status post right rotator cuff repair                Rehab Codes: Y06.419, M25.611  Onset Date: DOS (22)                                       Next 's appt. : 22   Visit# / total visits:  (2-3x wk)                       Cancels/No Shows: 0/0    Subjective:  Pt arrived with no pain this morning. States she is hopeful she can remove sling next week. Pain:  [] Yes  [x] No  Location: R shoulder  Pain Rating: (0-10 scale) 0/10  Pain altered Tx:  [x] No  [] Yes  Action:  Comments:       6 weeks (22): PROM abduction, iso ER/IR, Rows ,shrugs    Objective:  Modalities: vaso 15' low compression.    Precautions: No active Lifting, Pushing, Pulling                                   Exercises: Utilize medium RTC repair protocol  Exercise  R RTC Repair     S/P week 2 ()    Reps/ Time Weight/ Level Comments                         Pendulums  2x20    forward/back, lateral, circles x   Hand squeezes 2x12, 5\"    green foam x   Wrist ext/flex s 3x30\"   x   Wrist ext,flex, rad dev 2x15 1#  shoulder supported to isolate forwar/wrist x   Sup/pro AROM 2x15  Wand with one weight  x   Elbow flex/ext 2x15     x   Scapula retraction  2x15   x   Shrugs  2x15   x   UT S 3x30\"   x              Supine wand ER 10x10\"     x              PROM  10'    ER/flexion/IR only until 6 weeks x                                    Other:        Specific Instructions for next treatment: Continue tx per POC      Treatment Charges: Mins Units   []  Modalities     [x]  Ther Exercise 30 2   []  Manual Therapy     []  Ther Activities     []  Aquatics     [x]  Vasocompression 15 1   []  Other     Total Treatment time 45 3       Assessment: [x] Progressing toward goals. Progressed pt with adding green foam for hand squeezes and wand for supination and pronation with good tolerance. Pt noting only exercise or stretch that causes pain is wand ER stretch. Pt having good ROM just needing cues during PROM to relax and allow therapist to perform motion with fair carryover. Ended with vaso for decreased soreness. [] No change. [] Other:  [x] Patient would continue to benefit from skilled physical therapy services in order to:Progress R shoulder strength, ROM, and functional use following R RTC repair    STG: (to be met in 10 treatments)  1. ? Pain: Pt to decrease pain levels to 4/10 to help improve tolerance to progression through ROM. 2. Pt will self-report better tolerance to sleeping to help improve quality of sleep. 3. Independent with Home Exercise Programs     LTG: (to be met in 20 treatments)  1. Improve score of UEFS from 82% impaired to <50% impaired, demonstrating improved functional use of R UE.  2. ? ROM: Increase R UE PROM to full in all planes, allowing for transition to full AROM. 3. Decrease pain to 0/10     LT treatments  1. Pt will demonstrate R AROM equal to L to reduce need for compensations with functional use  2.  Pt will demonstrate R shoulder strength to 4+/5 grossly, reducing need for compensations with use of R UE.  3. Pt will demonstrate ability to complete all overhead motion with R UE without any compensations with UT, improve mechanics with completion. 4. Improve score of UEFS from 82% impaired to <30% impaired, demonstrating improved functional use of R UE. Patient goals: \"Regain movement without pain\"    Pt. Education:  [x] Yes  [] No  [x] Reviewed Prior HEP/Ed  Method of Education: [x] Verbal  [x] Demo  [] Written  Comprehension of Education:  [x] Verbalizes understanding. [x] Demonstrates understanding. [x] Needs review. [] Demonstrates/verbalizes HEP/Ed previously given. Plan: [x] Continue current frequency toward long and short term goals.     [x] Specific Instructions for subsequent treatments: Continue tx per Protocol      Time In: 9:00 am          Time Out:  9:50am    Electronically signed by:  Manuel Katz PTA

## 2022-04-04 ENCOUNTER — HOSPITAL ENCOUNTER (OUTPATIENT)
Dept: PHYSICAL THERAPY | Facility: CLINIC | Age: 67
Setting detail: THERAPIES SERIES
Discharge: HOME | End: 2022-04-04
Payer: MEDICARE

## 2022-04-04 PROCEDURE — 97110 THERAPEUTIC EXERCISES: CPT

## 2022-04-04 PROCEDURE — 97016 VASOPNEUMATIC DEVICE THERAPY: CPT

## 2022-04-04 NOTE — FLOWSHEET NOTE
[] [facility]) [facility] &  Therapy  [address]  [phone]  F: [phone] [] [address]  [address]   [address]  P: [phone]  F: [phone] [x] [address] &  Therapy  [address]  P: [phone]  F: [phone] [] [address]  P: [phone]  F: [phone] [] [address]  [facility]   [address]   [place]: [phone]  F: [phone]      Physical Therapy Daily Treatment Note    Date:  2022  Patient Name:  Johanny Montesinos    :  1955  MRN: [id number]   Physician: Aditya Valdez MD                   Insurance: MEDICARE ([employer]. [employer].)  Medical Diagnosis: Y60.833 - Status post right rotator cuff repair                Rehab Codes: R94.435, M25.611  Onset Date: DOS (22)                                       Next 's appt. : 22   Visit# / total visits: 10/20 (2-3x wk)                       Cancels/No Shows: 0/0    Subjective:  Pt arrived with no pain and the usual stiffness this morning. Pain:  [] Yes  [x] No  Location: R shoulder  Pain Rating: (0-10 scale) 0/10  Pain altered Tx:  [x] No  [] Yes  Action:  Comments:       6 weeks (22): PROM abduction, iso ER/IR, Rows ,shrugs    Objective:  Modalities: vaso 15' low compression.    Precautions: No active Lifting, Pushing, Pulling                                   Exercises: Utilize medium RTC repair protocol  Exercise  R RTC Repair     S/P week 2 ()    Reps/ Time Weight/ Level Comments                         Pendulums  2x20    forward/back, lateral, circles x   Hand squeezes 2x12, 5\"    green foam x   Wrist ext/flex s 3x30\"   x   Wrist ext,flex, rad dev 2x15 1#  shoulder supported to isolate forwar/wrist x   Sup/pro 2x15  Wand with one weight  x Elbow flex/ext 2x15     x   Scapula retraction  2x15   x   Shrugs  2x15   x   UT S 3x30\"   x              Supine wand ER 10x10\"     x              PROM  10'    ER/flexion/IR only until 6 weeks x                                    Other:        Specific Instructions for next treatment: Continue tx per POC      Treatment Charges: Mins Units   []  Modalities     [x]  Ther Exercise 40 2   []  Manual Therapy     []  Ther Activities     []  Aquatics     [x]  Vasocompression 15 1   []  Other     Total Treatment time 55 3       Assessment: [x] Progressing toward goals. Continued with stretching and strengthening as noted above, maintaining precautions. Pt has inc discomfort this day especially with wand and PROM flexion. Pt has good ROM, but continues to need cues during passive to prevent active motion. Tx concluded with vaso for reduction in symptoms. [] No change. [] Other:  [x] Patient would continue to benefit from skilled physical therapy services in order to:Progress R shoulder strength, ROM, and functional use following R RTC repair    STG: (to be met in 10 treatments)  1. ? Pain: Pt to decrease pain levels to 4/10 to help improve tolerance to progression through ROM. 2. Pt will self-report better tolerance to sleeping to help improve quality of sleep. 3. Independent with Home Exercise Programs     LTG: (to be met in 20 treatments)  1. Improve score of UEFS from 82% impaired to <50% impaired, demonstrating improved functional use of R UE.  2. ? ROM: Increase R UE PROM to full in all planes, allowing for transition to full AROM. 3. Decrease pain to 0/10     LT treatments  1. Pt will demonstrate R AROM equal to L to reduce need for compensations with functional use  2.  Pt will demonstrate R shoulder strength to 4+/5 grossly, reducing need for compensations with use of R UE.  3. Pt will demonstrate ability to complete all overhead motion with R UE without any compensations with UT, improve mechanics with completion. 4. Improve score of UEFS from 82% impaired to <30% impaired, demonstrating improved functional use of R UE. Patient goals: \"Regain movement without pain\"    Pt. Education:  [x] Yes  [] No  [x] Reviewed Prior HEP/Ed  Method of Education: [x] Verbal  [x] Demo  [] Written  Comprehension of Education:  [x] Verbalizes understanding. [x] Demonstrates understanding. [x] Needs review. [] Demonstrates/verbalizes HEP/Ed previously given. Plan: [x] Continue current frequency toward long and short term goals. [x] Specific Instructions for subsequent treatments: Continue tx per Protocol      Time In: 9:00 am          Time Out:  9:55 am    Electronically signed by:  ELDON Luna The above documentation completed by Myesha Lackey, Student Physical Therapist Assistant, was reviewed and accepted by supervising Clinical Instructor Peña Lehman PTA. 113

## 2022-04-06 ENCOUNTER — HOSPITAL ENCOUNTER (OUTPATIENT)
Dept: PHYSICAL THERAPY | Facility: CLINIC | Age: 67
Setting detail: THERAPIES SERIES
Discharge: HOME | End: 2022-04-06
Payer: MEDICARE

## 2022-04-06 ENCOUNTER — OFFICE VISIT (OUTPATIENT)
Dept: ORTHOPEDIC SURGERY | Age: 67
End: 2022-04-06

## 2022-04-06 VITALS — RESPIRATION RATE: 12 BRPM | WEIGHT: 168 LBS | BODY MASS INDEX: 27.99 KG/M2 | HEIGHT: 65 IN

## 2022-04-06 DIAGNOSIS — Z98.890 STATUS POST ROTATOR CUFF REPAIR: Primary | ICD-10-CM

## 2022-04-06 PROCEDURE — 97016 VASOPNEUMATIC DEVICE THERAPY: CPT

## 2022-04-06 PROCEDURE — 99024 POSTOP FOLLOW-UP VISIT: CPT | Performed by: ORTHOPAEDIC SURGERY

## 2022-04-06 PROCEDURE — 97110 THERAPEUTIC EXERCISES: CPT

## 2022-04-06 NOTE — PROGRESS NOTES
Procedure: Right shoulder arthroscopic rotator cuff repair  Date of procedure: 2/22/22    HPI: Adán Garzon is a 77 y.o. female who is approximately 6 weeks status post the aforementioned procedure. She indicates that she is doing relatively well. She  has remained compliant with her immobilization. Therapy is reportedly going well. Pain is rated at 0/10. Physical examination:  Resp 12   Ht 5' 5\" (1.651 m)   Wt 168 lb (76.2 kg)   BMI 27.96 kg/m²   Evaluation of patient's right shoulder and upper extremity demonstrates her incisions to be healed appropriately. There is minimal to no swelling at this time. Sensation is grossly intact light touch in all dermatomes and she has a 2+ radial pulse. Impression and plan: Adán Garzon is a 77 y.o. female  who is approximately 6 weeks status post right shoulder arthroscopic rotator cuff repair. She is doing relatively well at this time. She may now discontinue her sling and will continue with physical therapy. She may use the extremity for light activities of daily living not to exceed lifting, pushing or pulling more than a pound. I will see her back my clinic in 6 weeks for reevaluation but she was encouraged to return or call earlier with questions and/or concerns.

## 2022-04-06 NOTE — FLOWSHEET NOTE
[] [facility]) - [facility] &  Therapy  [address]  [phone]  F: [phone] [] [address]  [address]   [address]  P: [phone]  F: [phone] [x] [address] &  Therapy  [address] [facility] [address]  P: [phone]  F: [phone] [] [email or web address]  P: [phone]  F: [phone] [] [address]  [facility]   [address]   [place]: [phone]  F: [phone]      Physical Therapy Daily Treatment Note    Date:  2022  Patient Name:  J Carlos Bridges    :  1955  MRN: [id number]   Physician: Melissa Cárdenas MD                   Insurance: MEDICARE ([employer].)  Medical Diagnosis: E53.267 - Status post right rotator cuff repair                Rehab Codes: O99.672, M25.611  Onset Date: DOS (22)                                       Next 's appt. : 22   Visit# / total visits: 10/20 (2-3x wk)                       Cancels/No Shows: 0/0    Subjective:  Pt arrived with no pain, just complaint of stiffness. Pain:  [] Yes  [x] No  Location: R shoulder  Pain Rating: (0-10 scale) 0/10  Pain altered Tx:  [x] No  [] Yes  Action:  Comments:       6 weeks (22): PROM abduction, iso ER/IR, Rows ,shrugs    Objective:  Modalities: vaso 15' low compression.    Precautions: No active Lifting, Pushing, Pulling                                   Exercises: Utilize medium RTC repair protocol  Exercise  R RTC Repair     S/P week 2 ()    Reps/ Time Weight/ Level Comments                         Pendulums  2x20    forward/back, lateral, circles x   Hand squeezes 2x12, 5\"    green foam x   Wrist ext/flex s 3x30\"   x   Wrist ext,flex, rad dev 2x15 1#  shoulder supported to isolate forwar/wrist x   Sup/pro 2x15  Wand with one weight  x   Elbow flex/ext 2x15     x   Scapula retraction  2x15   x   Shrugs  2x15   x   UT S 3x30\"   x              Supine wand ER 10x10\"     x              PROM  10'    ER/flexion/IR only until 6 weeks x                                    Other:  ROM: : PROM in supine flexion 155, ER 75        Specific Instructions for next treatment: Continue tx per POC      Treatment Charges: Mins Units   []  Modalities     [x]  Ther Exercise 40 2   []  Manual Therapy     []  Ther Activities     []  Aquatics     [x]  Vasocompression 15 1   []  Other     Total Treatment time 55 3       Assessment: [x] Progressing toward goals. Continued with ex log maintaining same precautions until pt sees Dr Jerald Grey this afternoon for check up. Pt was re-measured for only ER and flexion and recorded above. Pt demo firm end feel in flexion at 155 degrees with minimal discomfort and firm end feel in ER at 75 degrees with most discomfort in anterior shoulder. Continued with vaso for pain management concluding tx.      [] No change. [] Other:  [x] Patient would continue to benefit from skilled physical therapy services in order to:Progress R shoulder strength, ROM, and functional use following R RTC repair    STG: (to be met in 10 treatments)  1. ? Pain: Pt to decrease pain levels to 4/10 to help improve tolerance to progression through ROM. 2. Pt will self-report better tolerance to sleeping to help improve quality of sleep. 3. Independent with Home Exercise Programs     LTG: (to be met in 20 treatments)  1. Improve score of UEFS from 82% impaired to <50% impaired, demonstrating improved functional use of R UE.  2. ? ROM: Increase R UE PROM to full in all planes, allowing for transition to full AROM. 3. Decrease pain to 0/10     LT treatments  1. Pt will demonstrate R AROM equal to L to reduce need for compensations with functional use  2.  Pt will demonstrate R shoulder strength to 4+/5 grossly, reducing need for compensations with use of R UE.  3. Pt will demonstrate ability to complete all overhead motion with R UE without any compensations with UT, improve mechanics with completion. 4. Improve score of UEFS from 82% impaired to <30% impaired, demonstrating improved functional use of R UE. Patient goals: \"Regain movement without pain\"    Pt. Education:  [x] Yes  [] No  [x] Reviewed Prior HEP/Ed  Method of Education: [x] Verbal  [x] Demo  [] Written  Comprehension of Education:  [x] Verbalizes understanding. [x] Demonstrates understanding. [x] Needs review. [] Demonstrates/verbalizes HEP/Ed previously given. Plan: [x] Continue current frequency toward long and short term goals.     [x] Specific Instructions for subsequent treatments: Continue tx per Protocol      Time In: 9:00 am          Time Out:  9:55 am    Electronically signed by:  Jonelle Brian PTA

## 2022-04-08 ENCOUNTER — HOSPITAL ENCOUNTER (OUTPATIENT)
Dept: PHYSICAL THERAPY | Facility: CLINIC | Age: 67
Setting detail: THERAPIES SERIES
Discharge: HOME | End: 2022-04-08
Payer: MEDICARE

## 2022-04-08 PROCEDURE — 97016 VASOPNEUMATIC DEVICE THERAPY: CPT

## 2022-04-08 PROCEDURE — 97110 THERAPEUTIC EXERCISES: CPT

## 2022-04-08 NOTE — FLOWSHEET NOTE
[] [facility]) [facility] &  Therapy  [address]  [phone]  F: [phone] [] [address]  [address]   [address]  P: [phone]  F: [phone] [x] [address] &  Therapy  [address]  P: [phone]  F: [phone] [] [address]  P: [phone]  F: [phone] [] [address]  [facility]   [address]   [place]: [phone]  F: [phone]      Physical Therapy Daily Treatment Note    Date:  2022  Patient Name:  J Carlos Bridges    :  1955  MRN: [id number]   Physician: Melissa Cárdenas MD                   Insurance: MEDICARE ([employer]. [employer])  Medical Diagnosis: Y84.974 - Status post right rotator cuff repair                Rehab Codes: Q78.527, M25.611  Onset Date: DOS (22)                                       Next 's appt.: 22   Visit# / total visits:  (2-3x wk)                       Cancels/No Shows: 0/0    Subjective:  Pt arrives stating she is not having pain, but the shoulder is achy from being able to actively move shoulder. Pain:  [] Yes  [x] No  Location: R shoulder  Pain Rating: (0-10 scale) 0/10  Pain altered Tx:  [x] No  [] Yes  Action:  Comments:       8 weeks (22): gentle IR behind back, refer to protocol for further progression  Objective:  Modalities: vaso 15' low compression.    Precautions: No active Lifting, Pushing, Pulling                                   Exercises: Utilize medium RTC repair protocol  Exercise  R RTC Repair     S/P week 2 ()    Reps/ Time Weight/ Level Comments    Pulleys  3'    Flexion, scaption x              Pendulums  2x20    forward/back, lateral, circles    Hand squeezes 2x12, 5\"    green foam x   Wrist ext/flex s 3x30\"   x   Wrist ext,flex, rad dev 2x15 1#  shoulder supported to isolate forwar/wrist x   Sup/pro 2x15  Wand with one weight  x   Elbow flex/ext 2x15 1#      Scapula retraction  2x15   x   Shrugs  2x15   x   UT S 3x30\"   x              Supine wand flex, ER 10x10\"     x              PROM  10'    ER/flexion/IR only until 6 weeks x                                    Other:  ROM: 4/6: PROM in supine flexion 155, ER 75        Specific Instructions for next treatment: assisted wall slides into flex      Treatment Charges: Mins Units   []  Modalities     [x]  Ther Exercise 45 3   []  Manual Therapy     []  Ther Activities     []  Aquatics     [x]  Vasocompression 15 1   []  Other     Total Treatment time 60 4       Assessment: [x] Progressing toward goals. Began with pulleys as warmup followed with stretching and strengthening. Pt demos some reduced ROM with flexion that improves with movement. Weight added with elbow flexion and initiated iso flexion and ext with good pt tolerance. Progressed wand movements to include flexion and inc abd during ER to about 70 deg. Pt demos inc fatigue and some discomfort when performing flex movements. Continued with prolonged PROM flex and ER to continue improving ROM. Pt educated on the importance of avoiding active flex movements. Continued with vaso for pain management concluding tx.      [] No change. [] Other:  [x] Patient would continue to benefit from skilled physical therapy services in order to:Progress R shoulder strength, ROM, and functional use following R RTC repair    STG: (to be met in 10 treatments)  1. ? Pain: Pt to decrease pain levels to 4/10 to help improve tolerance to progression through ROM. 2. Pt will self-report better tolerance to sleeping to help improve quality of sleep. 3. Independent with Home Exercise Programs     LTG: (to be met in 20 treatments)  1.  Improve score of UEFS from 82% impaired to <50% impaired, demonstrating improved functional use of R UE.  2. ? ROM: Increase R UE PROM to full in all planes, allowing for transition to full AROM. 3. Decrease pain to 0/10     LT treatments  1. Pt will demonstrate R AROM equal to L to reduce need for compensations with functional use  2. Pt will demonstrate R shoulder strength to 4+/5 grossly, reducing need for compensations with use of R UE.  3. Pt will demonstrate ability to complete all overhead motion with R UE without any compensations with UT, improve mechanics with completion. 4. Improve score of UEFS from 82% impaired to <30% impaired, demonstrating improved functional use of R UE. Patient goals: \"Regain movement without pain\"    Pt. Education:  [x] Yes  [] No  [x] Reviewed Prior HEP/Ed  Method of Education: [x] Verbal  [x] Demo  [] Written  Comprehension of Education:  [x] Verbalizes understanding. [x] Demonstrates understanding. [x] Needs review. [] Demonstrates/verbalizes HEP/Ed previously given. Plan: [x] Continue current frequency toward long and short term goals. [x] Specific Instructions for subsequent treatments: Continue tx per Protocol      Time In: 9:00 am          Time Out:  10:00 am    Electronically signed by:  ELDON Peterson The above documentation completed by Susan Kulkarni, Student Physical Therapist Assistant, was reviewed and accepted by supervising Clinical Instructor Niko Solis PTA.

## 2022-04-11 ENCOUNTER — HOSPITAL ENCOUNTER (OUTPATIENT)
Dept: PHYSICAL THERAPY | Facility: CLINIC | Age: 67
Setting detail: THERAPIES SERIES
Discharge: HOME | End: 2022-04-11
Payer: MEDICARE

## 2022-04-11 PROCEDURE — 97016 VASOPNEUMATIC DEVICE THERAPY: CPT

## 2022-04-11 PROCEDURE — 97110 THERAPEUTIC EXERCISES: CPT

## 2022-04-11 NOTE — FLOWSHEET NOTE
[] [facility]) - [facility] &  Therapy  [address]  [phone]  F: [phone] [] [address]  [address]  P: [phone]  F: [phone] [x] [address] &  Therapy  [address]  P: [phone]  F: [phone] [] [address]  P: [phone]  F: [phone] [] [address]  [facility]   [address]   [place]: [phone]  F: [phone]      Physical Therapy Daily Treatment Note    Date:  2022  Patient Name:  Jeremías Sebastian    :  1955  MRN: [id number]   Physician: Jelani Seaman MD                   Insurance: [facility] [address] [place] [address] ([employer])  Medical Diagnosis: R76.989 - Status post right rotator cuff repair                Rehab Codes: Y48.158, M25.611  Onset Date: DOS (22)                                       Next 's appt.: 22   Visit# / total visits:  (2-3x wk)                       Cancels/No Shows: 0/0    Subjective: Pt states she had a rough weekend due to inc soreness and muscle spasms. Describes pain in shoulder and wrist as achy. States she has been avoiding active flexion and abduction. Was able to drive for the first time this morning. Pain:  - Yes  - No  Location: R shoulder, wrist Pain Rating: (0-10 scale) 2/10  Pain altered Tx:  - No  - Yes  Action:  Comments:       8 weeks (22): gentle IR behind back, refer to protocol for further progression  Objective:  Modalities: vaso 15' low compression.    Precautions: No active Lifting, Pushing, Pulling                                   Exercises: Utilize medium RTC repair protocol  Exercise  R RTC Repair     S/P week 2 ()    Reps/ Time Weight/ Level Comments    UBE 5'  No resistance x   Pulleys  3'   Flexion, scaption x UT S 3x30\"   x   Wrist ext/flex s 3x30\"   x   Pendulums  2x20    forward/back, lateral, circles -   Hand squeezes 2x12, 5\"    green foam x   Wrist ext,flex, rad dev 2x15 1#  shoulder supported to isolate forwar/wrist x   Sup/pro 2x15  Wand with one weight  x   Elbow flex/ext 2x15 1#   x   Scapula retraction  2x15   x   Shrugs  2x15   x   Standing ext AROM 2x15   x              Supine wand flex, ER 10x10\"     x   Assisted wall slides 10x10\"   x              PROM  10'    ER/flexion/IR only until 6 weeks x                                    Other:  ROM: 4/6: PROM in supine flexion 155, ER 75        Specific Instructions for next treatment:       Treatment Charges: Mins Units   []  Modalities     [x]  Ther Exercise 50 3   []  Manual Therapy     []  Ther Activities     []  Aquatics     [x]  Vasocompression 15 1   []  Other     Total Treatment time 65 4       Assessment: [x] Progressing toward goals. Began with UBE and pulleys as warmup followed with stretching and strengthening. Pt demos less restricted movement into flex when performing pulleys. Initiated assisted wall walks to continue improving active flexion ROM. Pt notes some pain that is experienced in the R UT, shoulder, and down into the wrist. Pain is described as achy. Also initiated shoulder ext AROM in standing to improve mobility. Continued with prolonged PROM flex and ER in different positions to continue improving ROM. Pt educated on the importance of avoiding active flex movements. Continued with vaso for pain management concluding tx.      [] No change. [] Other:  [x] Patient would continue to benefit from skilled physical therapy services in order to:Progress R shoulder strength, ROM, and functional use following R RTC repair    STG: (to be met in 10 treatments)  1. ? Pain: Pt to decrease pain levels to 4/10 to help improve tolerance to progression through ROM.   2. Pt will self-report better tolerance to sleeping to help improve quality of sleep.  3. Independent with Home Exercise Programs     LTG: (to be met in 20 treatments)  1. Improve score of UEFS from 82% impaired to <50% impaired, demonstrating improved functional use of R UE.  2. ? ROM: Increase R UE PROM to full in all planes, allowing for transition to full AROM. 3. Decrease pain to 0/10     LT treatments  1. Pt will demonstrate R AROM equal to L to reduce need for compensations with functional use  2. Pt will demonstrate R shoulder strength to 4+/5 grossly, reducing need for compensations with use of R UE.  3. Pt will demonstrate ability to complete all overhead motion with R UE without any compensations with UT, improve mechanics with completion. 4. Improve score of UEFS from 82% impaired to <30% impaired, demonstrating improved functional use of R UE. Patient goals: \"Regain movement without pain\"    Pt. Education:  [x] Yes  [] No  [x] Reviewed Prior HEP/Ed  Method of Education: [x] Verbal  [x] Demo  [] Written  Comprehension of Education:  [x] Verbalizes understanding. [x] Demonstrates understanding. [x] Needs review. [] Demonstrates/verbalizes HEP/Ed previously given. Plan: [x] Continue current frequency toward long and short term goals. [x] Specific Instructions for subsequent treatments: Continue tx per Protocol      Time In: 9:00 am          Time Out:  10:10 am    Electronically signed by:  ELDON Lutz The above documentation completed by Jourdan Robb, Student Physical Therapist Assistant, was reviewed and accepted by supervising Clinical Instructor Ilya Titus PTA.

## 2022-04-13 ENCOUNTER — HOSPITAL ENCOUNTER (OUTPATIENT)
Dept: PHYSICAL THERAPY | Facility: CLINIC | Age: 67
Setting detail: THERAPIES SERIES
Discharge: HOME | End: 2022-04-13
Payer: MEDICARE

## 2022-04-13 PROCEDURE — 97016 VASOPNEUMATIC DEVICE THERAPY: CPT

## 2022-04-13 PROCEDURE — 97110 THERAPEUTIC EXERCISES: CPT

## 2022-04-13 NOTE — FLOWSHEET NOTE
[] [facility]) - [facility] &  Therapy  [address]  [phone]  F: [phone] [] [address]  [address]   [address]  P: [phone]  F: [phone] [x] [address]  Therapy  [address]  P: [phone]  F: [phone] [] [address]  P: [phone]  F: [phone] [] [address]  [facility]   [address]   [place]: [phone]  F: [phone]      Physical Therapy Daily Treatment Note    Date:  2022  Patient Name:  Jerry Whitley    :  1955  MRN: [id number]   Physician: Dianna Man MD                   Insurance: MEDICARE ([employer]  Medical Diagnosis: R37.361 - Status post right rotator cuff repair                Rehab Codes: T82.701, M25.611  Onset Date: DOS (22)                                       Next 's appt.: 22   Visit# / total visits:  (2-3x wk)                       Cancels/No Shows: 0/0    Subjective:   Pain:  - Yes  - No  Location: R shoulder, wrist Pain Rating: (0-10 scale) 1/10  Pain altered Tx:  - No  - Yes  Action:  Comments: Pt states that pain has improved since prev tx, ibuprofen helped reduce referred pain down the arm and into the hand. Most of the pain is felt in the lat shoulder/proximal UE. 8 weeks (22): gentle IR behind back, refer to protocol for further progression  Objective:  Modalities: vaso 15' low compression.    Precautions: No active Lifting, Pushing, Pulling                                   Exercises: Utilize medium RTC repair protocol  Exercise  R RTC Repair     S/P week 2 (Tu)    Reps/ Time Weight/ Level Comments    UBE 5'  No resistance x   Pulleys 3'   Flexion, scaption x              UT S 3x30\"   x   Wrist ext/flex s 3x30\"   x Pendulums  2x20    forward/back, lateral, circles -   Hand squeezes 2x15, 5\" 3#   x   Wrist ext,flex, rad dev 2x15 1#  shoulder supported to isolate forwar/wrist x   Sup/pro 2x15 1# Wand with one weight     Elbow flex/ext 2x15 1#   x   Scapula retraction  2x15   x   Shrugs  2x15   x   Standing ext AROM 2x15   x              Supine wand flex, ER 10x10\"     x   Assisted wall slides 10x10\"   x              PROM  10'    ER/flexion/IR only until 6 weeks x                                    Other:  ROM: 4/6: PROM in supine flexion 155, ER 75        Specific Instructions for next treatment:       Treatment Charges: Mins Units   []  Modalities     [x]  Ther Exercise 45 3   []  Manual Therapy     []  Ther Activities     []  Aquatics     [x]  Vasocompression 15 1   []  Other     Total Treatment time 60 4       Assessment: [x] Progressing toward goals. Began with UBE and pulleys as warmup followed with stretching and strengthening. Continued with current exercise plan while maintaining precautions. Min verbal cueing needed to avoid compensation from shoulder elevation during AROM extension. Pt noted some catching when performing flexion on pulleys. Catching was not experienced with PROM, wall slides, or wand flexion. Pt continues to note inc stiffness with wand ER and PROM. Pt did not experience any radiating pain down the UE this session. Tx concluded with vaso for reduction in sxs. [] No change. [] Other:  [x] Patient would continue to benefit from skilled physical therapy services in order to:Progress R shoulder strength, ROM, and functional use following R RTC repair    STG: (to be met in 10 treatments)  1. ? Pain: Pt to decrease pain levels to 4/10 to help improve tolerance to progression through ROM. 2. Pt will self-report better tolerance to sleeping to help improve quality of sleep. 3. Independent with Home Exercise Programs     LTG: (to be met in 20 treatments)  1.  Improve score of UEFS from 82% impaired to <50% impaired, demonstrating improved functional use of R UE.  2. ? ROM: Increase R UE PROM to full in all planes, allowing for transition to full AROM. 3. Decrease pain to 0/10     LT treatments  1. Pt will demonstrate R AROM equal to L to reduce need for compensations with functional use  2. Pt will demonstrate R shoulder strength to 4+/5 grossly, reducing need for compensations with use of R UE.  3. Pt will demonstrate ability to complete all overhead motion with R UE without any compensations with UT, improve mechanics with completion. 4. Improve score of UEFS from 82% impaired to <30% impaired, demonstrating improved functional use of R UE. Patient goals: \"Regain movement without pain\"    Pt. Education:  [x] Yes  [] No  [x] Reviewed Prior HEP/Ed  Method of Education: [x] Verbal  [x] Demo  [] Written  Comprehension of Education:  [x] Verbalizes understanding. [x] Demonstrates understanding. [x] Needs review. [] Demonstrates/verbalizes HEP/Ed previously given. Plan: [x] Continue current frequency toward long and short term goals. [x] Specific Instructions for subsequent treatments: Continue tx per Protocol      Time In: 9:00 am          Time Out: 10:00 am    Electronically signed by:  ELDON Ta The above documentation completed by Jameson Duarte, Student Physical Therapist Assistant, was reviewed and accepted by supervising Clinical Instructor [facility].

## 2022-04-14 ENCOUNTER — HOSPITAL ENCOUNTER (OUTPATIENT)
Dept: PHYSICAL THERAPY | Facility: CLINIC | Age: 67
Setting detail: THERAPIES SERIES
Discharge: HOME | End: 2022-04-14
Payer: MEDICARE

## 2022-04-14 PROCEDURE — 97110 THERAPEUTIC EXERCISES: CPT

## 2022-04-14 PROCEDURE — 97016 VASOPNEUMATIC DEVICE THERAPY: CPT

## 2022-04-14 NOTE — FLOWSHEET NOTE
[] [facility]) - [facility] &  Therapy  [address]  [phone]  F: [phone] [] [address]  [employer]   [address]  P: [phone]  F: [phone] [x] [address] &  Therapy  [address]  P: [phone]  F: [phone] [] [address]  P: [phone]  F: [phone] [] [address]  [facility]   [address]   [place]: [phone]  F: [phone]      Physical Therapy Daily Treatment Note    Date:  2022  Patient Name:  Yeison Huynh    :  1955  MRN: [id number]   Physician: Los Bobo MD                   Insurance: [employer] ([employer]  Medical Diagnosis: X83.596 - Status post right rotator cuff repair                Rehab Codes: Z71.448, M25.611  Onset Date: DOS (22)                                       Next 's appt.: 22   Visit# / total visits:  (2-3x wk)                       Cancels/No Shows: 0/0    Subjective:   Pain:  - Yes  - No  Location: R shoulder, wrist Pain Rating: (0-10 scale) 1/10  Pain altered Tx:  - No  - Yes  Action:  Comments: Pt mentioned feeling stiff today. 8 weeks (22): gentle IR behind back, refer to protocol for further progression  Objective:  Modalities: vaso 15' low compression.    Precautions: No active Lifting, Pushing, Pulling                                   Exercises: Utilize medium RTC repair protocol  Exercise  R RTC Repair     S/P week 2 ()    Reps/ Time Weight/ Level Comments    UBE 6'  No resistance x   Pulleys 3'   Flexion, scaption x              UT S 3x30\"   x          Wrist ext/flex s 3x30\"   HEP   Hand squeezes 2x15, 5\" 3#   HEP   Wrist ext,flex, rad dev 2x15 1#  shoulder supported to isolate forwar/wrist HEP   Sup/pro 2x15 1# Wand with one weight  HEP   Elbow flex/ext 2x15 1#   HEP          Scapula retraction  2x15    x   Shrugs  2x15   x   Standing ext AAROM 10x10\"  Wand  x   Supine wand flex, ER 10x10\"   Wand     Assisted wall slides 20x10\"  Flexion/scaption x   Table slides 10x10\"   Flexion/scaption x   PROM  10'    ER/flexion/IR only until 6 weeks x                                    Other:  ROM: /6: PROM in supine flexion 155, ER 75        Specific Instructions for next treatment:       Treatment Charges: Mins Units   []  Modalities     [x]  Ther Exercise 45 3   []  Manual Therapy     []  Ther Activities     []  Aquatics     [x]  Vasocompression 15 1   []  Other     Total Treatment time 60 4       Assessment: [x] Progressing toward goals. Continued working on increasing pt ROM with gentle AAROM exercises. Able to add in table slides today performed in flexion and scaption. Educated pt on keeping up with her HEP and also being able to add in wand exercise to program. Finished with vaso at end of session today. [] No change. [] Other:  [x] Patient would continue to benefit from skilled physical therapy services in order to:Progress R shoulder strength, ROM, and functional use following R RTC repair    STG: (to be met in 10 treatments)  1. ? Pain: Pt to decrease pain levels to 4/10 to help improve tolerance to progression through ROM. 2. Pt will self-report better tolerance to sleeping to help improve quality of sleep. 3. Independent with Home Exercise Programs     LTG: (to be met in 20 treatments)  1. Improve score of UEFS from 82% impaired to <50% impaired, demonstrating improved functional use of R UE.  2. ? ROM: Increase R UE PROM to full in all planes, allowing for transition to full AROM. 3. Decrease pain to 0/10     LT treatments  1. Pt will demonstrate R AROM equal to L to reduce need for compensations with functional use  2.  Pt will demonstrate R shoulder strength to 4+/5 grossly, reducing need for compensations with use of R UE.  3. Pt will demonstrate ability to complete all overhead motion with R UE without any compensations with UT, improve mechanics with completion. 4. Improve score of UEFS from 82% impaired to <30% impaired, demonstrating improved functional use of R UE. Patient goals: \"Regain movement without pain\"    Pt. Education:  [x] Yes  [] No  [x] Reviewed Prior HEP/Ed  Method of Education: [x] Verbal  [x] Demo  [] Written  Comprehension of Education:  [x] Verbalizes understanding. [x] Demonstrates understanding. [x] Needs review. [] Demonstrates/verbalizes HEP/Ed previously given. Plan: [x] Continue current frequency toward long and short term goals.     [x] Specific Instructions for subsequent treatments: Continue tx per Protocol      Time In: 9:07 am          Time Out: 10:10am     Electronically signed by:  Alonso Escobedo PTA

## 2022-04-18 ENCOUNTER — HOSPITAL ENCOUNTER (OUTPATIENT)
Dept: PHYSICAL THERAPY | Facility: CLINIC | Age: 67
Setting detail: THERAPIES SERIES
Discharge: HOME | End: 2022-04-18
Payer: MEDICARE

## 2022-04-18 PROCEDURE — 97016 VASOPNEUMATIC DEVICE THERAPY: CPT

## 2022-04-18 PROCEDURE — 97110 THERAPEUTIC EXERCISES: CPT

## 2022-04-18 NOTE — FLOWSHEET NOTE
[] [facility]) - [facility] &  Therapy  [address]  [phone]  F: [phone] [] [address]  [address]   [address]  P: [phone]  F: [phone] [x] [address]  Therapy  [address]  P: [phone]  F: [phone] [] [address]  P: [phone]  F: [phone] [] [address]  [facility]   [address]   [place]: [phone]  F: [phone]      Physical Therapy Daily Treatment Note    Date:  2022  Patient Name:  Duane Ortiz    :  1955  MRN: [id number]   Physician: Kalin Arguelles MD                   Insurance: MEDICARE ([employer]  Medical Diagnosis: O50.627 - Status post right rotator cuff repair                Rehab Codes: H42.492, M25.611  Onset Date: DOS (22)                                       Next 's appt.: 22   Visit# / total visits: 15/20 (2-3x wk)                       Cancels/No Shows: 0/0    Subjective:   Pain:  - Yes  - No  Location: R shoulder, bicep    Pain Rating: (0-10 scale) 2/10  Pain altered Tx:  - No  - Yes  Action:  Comments: Pt arrives to therapy with inc soreness in R shoulder and bicep. States that inc periods of standing with arm at side inc sxs, pt normally ends up putting her arm in her jacket. 8 weeks (22): gentle IR behind back, refer to protocol for further progression  Objective:  Modalities: vaso 15' low compression.    Precautions: No active Lifting, Pushing, Pulling                                   Exercises: Utilize medium RTC repair protocol  Exercise  R RTC Repair     S/P week 2 ()    Reps/ Time Weight/ Level Comments    UBE 6'  No resistance x   Pulleys 3'   Flexion, scaption x              UT S 3x30\"   x          Wrist ext/flex s 3x30\" HEP   Hand squeezes 2x15, 5\" 3#   HEP   Wrist ext,flex, rad dev 2x15 1#  shoulder supported to isolate forwar/wrist HEP   Sup/pro 2x15 1# Wand with one weight  HEP   Elbow flex/ext 2x15 1#   HEP          Scapula retraction  2x15   x   Shrugs  2x15   x   Standing ext AAROM 10x10\"  Wand  x   Supine wand flex, ER 10x10\"   Wand  x   Assisted wall slides 20x10\"  Flexion/scaption x   Table slides 10x10\"   Flexion/scaption    PROM  10'    ER/flexion/IR only until 6 weeks x                                    Other:  ROM: 4/6: PROM in supine flexion 155, ER 75  Manual: TP release to R clavicular portion of pectoralis        Specific Instructions for next treatment:       Treatment Charges: Mins Units   []  Modalities     [x]  Ther Exercise 45 3   [x]  Manual Therapy 5 0   []  Ther Activities     []  Aquatics     [x]  Vasocompression 15 1   []  Other     Total Treatment time 65 4       Assessment: [x] Progressing toward goals. Continued working on increasing pt ROM with gentle AAROM PROM exercises. Continued with exercises noted above, due to inc sxs wand flexion and wall slides modified to be performed in pain-free ROM. No inc in sxs after modifying flexion exercises. Added in manual therapy to R pectoralis to relieve tension with most tightness noted in clavicular head of pectoralis. Pt notes reduced tightness after manual therapy. Vaso used at end of tx to reduce sxs. [] No change. [] Other:  [x] Patient would continue to benefit from skilled physical therapy services in order to:Progress R shoulder strength, ROM, and functional use following R RTC repair    STG: (to be met in 10 treatments)  1. ? Pain: Pt to decrease pain levels to 4/10 to help improve tolerance to progression through ROM. 2. Pt will self-report better tolerance to sleeping to help improve quality of sleep. 3. Independent with Home Exercise Programs     LTG: (to be met in 20 treatments)  1.  Improve score of UEFS from 82% impaired to <50% impaired, demonstrating improved functional use of R UE.  2. ? ROM: Increase R UE PROM to full in all planes, allowing for transition to full AROM. 3. Decrease pain to 0/10     LT treatments  1. Pt will demonstrate R AROM equal to L to reduce need for compensations with functional use  2. Pt will demonstrate R shoulder strength to 4+/5 grossly, reducing need for compensations with use of R UE.  3. Pt will demonstrate ability to complete all overhead motion with R UE without any compensations with UT, improve mechanics with completion. 4. Improve score of UEFS from 82% impaired to <30% impaired, demonstrating improved functional use of R UE. Patient goals: \"Regain movement without pain\"    Pt. Education:  [x] Yes  [] No  [x] Reviewed Prior HEP/Ed  Method of Education: [x] Verbal  [x] Demo  [] Written  Comprehension of Education:  [x] Verbalizes understanding. [x] Demonstrates understanding. [x] Needs review. [] Demonstrates/verbalizes HEP/Ed previously given. Plan: [x] Continue current frequency toward long and short term goals. [x] Specific Instructions for subsequent treatments: Continue tx per Protocol      Time In: 9:00 am          Time Out: 10:10am     Electronically signed by:  ELDON Alejandre The above documentation completed by Kervin Christensen, Student Physical Therapist Assistant, was reviewed and accepted by supervising Clinical Instructor [facility]

## 2022-04-20 ENCOUNTER — HOSPITAL ENCOUNTER (OUTPATIENT)
Dept: PHYSICAL THERAPY | Facility: CLINIC | Age: 67
Setting detail: THERAPIES SERIES
Discharge: HOME | End: 2022-04-20
Payer: MEDICARE

## 2022-04-20 PROCEDURE — 97110 THERAPEUTIC EXERCISES: CPT

## 2022-04-20 PROCEDURE — 97016 VASOPNEUMATIC DEVICE THERAPY: CPT

## 2022-04-20 NOTE — PROGRESS NOTES
10x10\"  Wand     Supine wand flex, ER 10x10\"   Wand     Assisted wall slides 20x10\"  Flexion/scaption    Table slides 10x10\"   Flexion/scaption    PROM  10'    ER/flexion/IR only until 6 weeks                AROM 2x10     Flexion x    IR towel S 3x30\"     x                 Prone ext   Start at 9 weeks    SL ER   Start at 9 weeks           Isometrics 2x10, 5\"  ER/IR, add flexion next visit x   Rhythmic stabilization   Next visit           T-band:       Rows 2x10, 3\"   x          Other: TP release to R clavicular portion of pectoralis- Held today, resume next visit        Specific Instructions for next treatment: Continue with tx per POC      Treatment Charges: Mins Units   []  Modalities     [x]  Ther Exercise 42 3   []  Manual Therapy     []  Ther Activities     []  Aquatics     [x]  Vasocompression 15 1   []  Other     Total Treatment time 57 4       Assessment: [x] Progressing toward goals. Completed tx per POC above with no complaints. Able to progress program today with addition of AROM and isometrics to progress shoulder strength, as well as rows to progress scapular stabilizer strength and IR towel S to help improve ROM. With completion of AROM, pt demonstrated good technique with minimal compensations while completing. Pt denies any pain with progressions, however reported fatigue at the end of today's session. Ended with vaso for soreness/pain. Since starting therapy, pt reports that things have been going well to date. Pt has demonstrated improved PROM and improved pain level since starting therapy, however has not worked on progressing AROM and strength yet d/t restrictions to date. Plan to continue with POC to keep working on progressing ROM and strength as able. [] No change.      [] Other:  [x] Patient would continue to benefit from skilled physical therapy services in order to:Progress R shoulder strength, ROM, and functional use following R RTC repair    STG: (to be met in 10 treatments)  1. ? Pain: Pt to decrease pain levels to 4/10 to help improve tolerance to progression through ROM.: MET (22)  2. Pt will self-report better tolerance to sleeping to help improve quality of sleep.:ONGOING, reports sleeping has improved, however can still have issues (22)  3. Independent with Home Exercise Programs: MET (22)     LTG: (to be met in 20 treatments)  1. Improve score of UEFS from 82% impaired to <50% impaired, demonstrating improved functional use of R UE.  2. ? ROM: Increase R UE PROM to full in all planes, allowing for transition to full AROM. 3. Decrease pain to 0/10     LT treatments  1. Pt will demonstrate R AROM equal to L to reduce need for compensations with functional use  2. Pt will demonstrate R shoulder strength to 4+/5 grossly, reducing need for compensations with use of R UE.  3. Pt will demonstrate ability to complete all overhead motion with R UE without any compensations with UT, improve mechanics with completion. 4. Improve score of UEFS from 82% impaired to <30% impaired, demonstrating improved functional use of R UE. Patient goals: \"Regain movement without pain\"    Pt. Education:  [x] Yes  [] No  [x] Reviewed Prior HEP/Ed  Method of Education: [x] Verbal  [x] Demo  [x] Written  Access Code: [email or web address]  URL: [email or web address]  Date: 2022  Prepared by:  [facility]    Exercises  Standing Isometric Shoulder External Rotation with Doorway - 1-2 x daily - 7 x weekly - 2 sets - 10 reps - 5 seconds hold  Standing Isometric Shoulder Internal Rotation at Doorway - 1-2 x daily - 7 x weekly - 2 sets - 10 reps - 5 seconds hold  Standing Shoulder Flexion to 90 Degrees - 3 x daily - 7 x weekly - 3 sets - 10 reps  Standing Row with Anchored Resistance - 1 x daily - 7 x weekly - 2-3 sets - 10 reps - 3 seconds hold  Standing Shoulder Internal Rotation Stretch with Towel - 3 x daily - 7 x weekly - 3 sets - 30 seconds hold    Comprehension of Education:  [x] Zoe understanding. [x] Demonstrates understanding. [x] Needs review. [] Demonstrates/verbalizes HEP/Ed previously given. Plan: [x] Continue current frequency toward long and short term goals. [x] Specific Instructions for subsequent treatments: Continue tx per Protocol      Time In: 0900          Time Out: 0567     Electronically signed by:   Negin Eric PT

## 2022-04-22 ENCOUNTER — HOSPITAL ENCOUNTER (OUTPATIENT)
Dept: PHYSICAL THERAPY | Facility: CLINIC | Age: 67
Setting detail: THERAPIES SERIES
Discharge: HOME | End: 2022-04-22
Payer: MEDICARE

## 2022-04-22 PROCEDURE — 97016 VASOPNEUMATIC DEVICE THERAPY: CPT

## 2022-04-22 PROCEDURE — 97110 THERAPEUTIC EXERCISES: CPT

## 2022-04-22 NOTE — FLOWSHEET NOTE
[] [facility]) - [facility] &  Therapy  [address]  [phone]  F: [phone] [] [address]  [facility]   [address]  P: [phone]  F: [phone] [x] [address] &  Therapy  [address]  P: [phone]  F: [phone] [] [address]  P: [phone]  F: [phone] [] [address]  [facility]   [address]   [place]: [phone]  F: [phone]      Physical Therapy Daily Treatment Note    Date:  2022  Patient Name:  Paula Young    :  1955  MRN: [id number]   Physician: Elisa Neville MD                   Insurance: MEDICARE ([employer])  Medical Diagnosis: S62.684 - Status post right rotator cuff repair                Rehab Codes: R23.698, M25.611  Onset Date: DOS (22)                                       Next 's appt.: 22   Visit# / total visits:  (2-3x wk)                       Cancels/No Shows: 0/0    Subjective: Pt reports stating she had a lot of pain after prev tx. Explains the pain as achy and it spread from her shoulder down to her wrist. Pt has dec pain today. Pain:  X Yes  - No  Location: R shoulder, bicep    Pain Rating: (0-10 scale) 1-2/10  Pain altered Tx:  x No  - Yes  Action:  Comments:          Objective:  Modalities: vaso 15' low compression.    Precautions: No active Lifting, Pushing, Pulling                                   Exercises: Utilize medium RTC repair protocol  Exercise  R RTC Repair     S/P week 11 ()    Reps/ Time Weight/ Level Comments    UBE 6'  No resistance    Pulleys 4'   Flexion, scaption x              UT S 3x30\"   x          Scapula retraction  2x15      Shrugs  2x15      Standing ext AAROM 10x10\"  Wand  x   Supine wand flex, ER 10x10\"   Wand Assisted wall slides 20x10\"  Flexion/scaption x   Table slides 10x10\"   Flexion/scaption    PROM  10'    ER/flexion/IR only until 6 weeks                AROM x10     Flexion x    IR towel S 3x20\"     x                 Prone ext   Start at 9 weeks    SL ER   Start at 9 weeks           Isometrics 2x10, 5\"  ER/IR, add flexion next visit x   Rhythmic stabilization 2x30\"  Flexion/ext, abd/add, IR/ER x          T-band:       Rows 2x10, 3\" Orange  x          Other: TP release to R clavicular portion of pectoralis        Specific Instructions for next treatment: Continue with tx per POC      Treatment Charges: Mins Units   []  Modalities     [x]  Ther Exercise 45 3   [x]  Manual Therapy 5 nc   []  Ther Activities     []  Aquatics     [x]  Vasocompression 15 1   []  Other     Total Treatment time 60 4       Assessment: [x] Progressing toward goals. Began today's tx with pulleys followed by stretching. Modified IR stretch and AROM flexion due to c/o inc pain after prev tx. Pt had no discomfort with IR stretch, slight pain with AROM flexion, verbally cued to eliminate shoulder elevation with good follow through. Pt given verbal cues and demo for proper completion of scaption wall slides with good carryover. Initiated rhythmic stabilization in all directions today with pt experiencing some discomfort in directions of flex/ext, force of perturbations reduced. TPR applied to clavicular portion of pectoralis with the most tenderness noted closest to shoulder. Tx concluded with vaso for sxs reduction. [] No change. [] Other:  [x] Patient would continue to benefit from skilled physical therapy services in order to:Progress R shoulder strength, ROM, and functional use following R RTC repair    STG: (to be met in 10 treatments)  1. ? Pain: Pt to decrease pain levels to 4/10 to help improve tolerance to progression through ROM.: MET (4/20/22)  2.  Pt will self-report better tolerance to sleeping to help improve quality of sleep.:ONGOING, reports sleeping has improved, however can still have issues (22)  3. Independent with Home Exercise Programs: MET (22)     LTG: (to be met in 20 treatments)  1. Improve score of UEFS from 82% impaired to <50% impaired, demonstrating improved functional use of R UE.  2. ? ROM: Increase R UE PROM to full in all planes, allowing for transition to full AROM. 3. Decrease pain to 0/10     LT treatments  1. Pt will demonstrate R AROM equal to L to reduce need for compensations with functional use  2. Pt will demonstrate R shoulder strength to 4+/5 grossly, reducing need for compensations with use of R UE.  3. Pt will demonstrate ability to complete all overhead motion with R UE without any compensations with UT, improve mechanics with completion. 4. Improve score of UEFS from 82% impaired to <30% impaired, demonstrating improved functional use of R UE. Patient goals: \"Regain movement without pain\"    Pt. Education:  [x] Yes  [] No  [x] Reviewed Prior HEP/Ed  Method of Education: [x] Verbal  [x] Demo  [x] Written  Access Code: [email or web address]  URL: [email or web address]  Date: 2022  Prepared by: Natalie Crocketto    Exercises  Standing Isometric Shoulder External Rotation with Doorway - 1-2 x daily - 7 x weekly - 2 sets - 10 reps - 5 seconds hold  Standing Isometric Shoulder Internal Rotation at Doorway - 1-2 x daily - 7 x weekly - 2 sets - 10 reps - 5 seconds hold  Standing Shoulder Flexion to 90 Degrees - 3 x daily - 7 x weekly - 3 sets - 10 reps  Standing Row with Anchored Resistance - 1 x daily - 7 x weekly - 2-3 sets - 10 reps - 3 seconds hold  Standing Shoulder Internal Rotation Stretch with Towel - 3 x daily - 7 x weekly - 3 sets - 30 seconds hold    Comprehension of Education:  [x] Verbalizes understanding. [x] Demonstrates understanding. [x] Needs review. [] Demonstrates/verbalizes HEP/Ed previously given.      Plan: [x] Continue current frequency toward long and short term goals. [x] Specific Instructions for subsequent treatments: Continue tx per Protocol      Time In: 9:02 am          Time Out: 10:05 am    Electronically signed by:  ELDON Aldana The above documentation completed by Paty Haywood, Student Physical Therapist Assistant, was reviewed and accepted by supervising Clinical Instructor [facility].

## 2022-04-25 ENCOUNTER — HOSPITAL ENCOUNTER (OUTPATIENT)
Dept: PHYSICAL THERAPY | Facility: CLINIC | Age: 67
Setting detail: THERAPIES SERIES
Discharge: HOME | End: 2022-04-25
Payer: MEDICARE

## 2022-04-25 PROCEDURE — 97016 VASOPNEUMATIC DEVICE THERAPY: CPT

## 2022-04-25 PROCEDURE — 97110 THERAPEUTIC EXERCISES: CPT

## 2022-04-25 NOTE — FLOWSHEET NOTE
Flexion/scaption x   Table slides 10x10\"   Flexion/scaption    PROM  10'    ER/flexion/IR only until 6 weeks                AROM x10     Flexion x    IR towel S 3x20\"     x                 Prone ext   Start at 9 weeks    SL ER   Start at 9 weeks           Isometrics 2x10, 5\"  ER/IR, flexion x   Rhythmic stabilization 2x30\"  Flexion/ext, abd/add, IR/ER x          T-band:       Rows 2x10, 3\" Orange  x          Other: TP release to R clavicular portion of pectoralis        Specific Instructions for next treatment: Continue with tx per POC      Treatment Charges: Mins Units   []  Modalities     [x]  Ther Exercise 45 3   [x]  Manual Therapy 5 nc   []  Ther Activities     []  Aquatics     [x]  Vasocompression 15 1   []  Other     Total Treatment time 60 4       Assessment: [x] Progressing toward goals. Began today's tx with pulleys followed by stretching. Pt is having decreased pain and soreness in L shoulder compared to prev tx, able to continue with exercise plan as noted above. Initiated flexion isometrics, with pt noting slight discomfort, able to complete all repetitions. Due to inc sxs with isometric ER, addition of SL ER held this date. Verbal cueing given during standing flexion AROM to remove compensation from shoulder elevation. TPR applied to clavicular portion of pectoralis, much less tension/tenderness noted this session compared to last. Tx concluded with vaso for sxs reduction. [] No change. [] Other:  [x] Patient would continue to benefit from skilled physical therapy services in order to:Progress R shoulder strength, ROM, and functional use following R RTC repair    STG: (to be met in 10 treatments)  1. ? Pain: Pt to decrease pain levels to 4/10 to help improve tolerance to progression through ROM.: MET (4/20/22)  2. Pt will self-report better tolerance to sleeping to help improve quality of sleep.:ONGOING, reports sleeping has improved, however can still have issues (4/20/22)  3.  Independent with Home Exercise Programs: MET (22)     LTG: (to be met in 20 treatments)  1. Improve score of UEFS from 82% impaired to <50% impaired, demonstrating improved functional use of R UE.  2. ? ROM: Increase R UE PROM to full in all planes, allowing for transition to full AROM. 3. Decrease pain to 0/10     LT treatments  1. Pt will demonstrate R AROM equal to L to reduce need for compensations with functional use  2. Pt will demonstrate R shoulder strength to 4+/5 grossly, reducing need for compensations with use of R UE.  3. Pt will demonstrate ability to complete all overhead motion with R UE without any compensations with UT, improve mechanics with completion. 4. Improve score of UEFS from 82% impaired to <30% impaired, demonstrating improved functional use of R UE. Patient goals: \"Regain movement without pain\"    Pt. Education:  [x] Yes  [] No  [x] Reviewed Prior HEP/Ed  Method of Education: [x] Verbal  [x] Demo  [x] Written  Access Code: [email or web address]  URL: [email or web address]  Date: 2022  Prepared by: Narcisa Vincent    Exercises  Standing Isometric Shoulder External Rotation with Doorway - 1-2 x daily - 7 x weekly - 2 sets - 10 reps - 5 seconds hold  Standing Isometric Shoulder Internal Rotation at Doorway - 1-2 x daily - 7 x weekly - 2 sets - 10 reps - 5 seconds hold  Standing Shoulder Flexion to 90 Degrees - 3 x daily - 7 x weekly - 3 sets - 10 reps  Standing Row with Anchored Resistance - 1 x daily - 7 x weekly - 2-3 sets - 10 reps - 3 seconds hold  Standing Shoulder Internal Rotation Stretch with Towel - 3 x daily - 7 x weekly - 3 sets - 30 seconds hold    Comprehension of Education:  [x] Verbalizes understanding. [x] Demonstrates understanding. [x] Needs review. [] Demonstrates/verbalizes HEP/Ed previously given. Plan: [x] Continue current frequency toward long and short term goals.     [x] Specific Instructions for subsequent treatments: Continue tx per Protocol      Time In: 9:02 am          Time Out: 10:10 am    Electronically signed by:  Evangelina Ahumada, SPTA The above documentation completed by Evangelina Ahumada, Student Physical Therapist Assistant, was reviewed and accepted by supervising Clinical Instructor Jeaneen Kussmaul, PTA.

## 2022-04-27 ENCOUNTER — HOSPITAL ENCOUNTER (OUTPATIENT)
Dept: PHYSICAL THERAPY | Facility: CLINIC | Age: 67
Setting detail: THERAPIES SERIES
Discharge: HOME | End: 2022-04-27
Payer: MEDICARE

## 2022-04-27 PROCEDURE — 97110 THERAPEUTIC EXERCISES: CPT

## 2022-04-27 PROCEDURE — 97016 VASOPNEUMATIC DEVICE THERAPY: CPT

## 2022-04-29 ENCOUNTER — HOSPITAL ENCOUNTER (OUTPATIENT)
Dept: PHYSICAL THERAPY | Facility: CLINIC | Age: 67
Setting detail: THERAPIES SERIES
Discharge: HOME | End: 2022-04-29
Payer: MEDICARE

## 2022-04-29 PROCEDURE — 97016 VASOPNEUMATIC DEVICE THERAPY: CPT

## 2022-04-29 PROCEDURE — 97110 THERAPEUTIC EXERCISES: CPT

## 2022-04-29 NOTE — FLOWSHEET NOTE
[] [facility]) - [facility] &  Therapy  [address]  [phone]  F: [phone] [] [address]  [facility]   [address]  P: [phone]  F: [phone] [x] [address] &  Therapy  [address]  P: [phone]  F: [phone] [] [address]  P: [phone]  F: [phone] [] [address]  [facility]   [address]   [place]: [phone]  F: [phone]      Physical Therapy Daily Treatment Note    Date:  2022  Patient Name:  Johanny Montesinos    :  1955  MRN: [id number]   Physician: Aditya Valdez MD                   Insurance: MEDICARE ([employer]  Medical Diagnosis: L04.719 - Status post right rotator cuff repair                Rehab Codes: L99.079, M25.611  Onset Date: DOS (22)                                       Next 's appt.: 22   Visit# / total visits:  (2-3x wk)                       Cancels/No Shows: 0/0    Subjective: Pt continues to have soreness after completing therapy/HEP, states it is improving. Pain:  X Yes  - No  Location: R shoulder, bicep    Pain Rating: (0-10 scale) 1/10  Pain altered Tx:  x No  - Yes  Action:  Comments:          Objective:  Modalities: vaso 15' low compression.    Precautions: No active Lifting, Pushing, Pulling                                   Exercises: Utilize medium RTC repair protocol  Exercise  R RTC Repair     S/P week 11 ()    Reps/ Time Weight/ Level Comments    UBE 6'  No resistance x   Pulleys 4'   Flexion, scaption x              UT S 3x30\"   x          Scapula retraction  2x15      Shrugs  2x15      Standing ext AAROM 10x10\"  Wand     Supine wand flex, ER 10x10\"   Wand  x   Wall slides 20x10\"  Flexion/scaption x   Table slides 10x10\" Flexion/scaption    PROM  10'    ER/flexion/IR                 AROM x20     Flexion x    IR towel S 3x30\"     x                 Prone ext x20   x   SL ER x20   x          Isometrics 2x10, 5\"  ER/IR, flexion x   Rhythmic stabilization 2x30\"  Flexion/ext, abd/add, IR/ER x          T-band:       Rows 3x10, 3\" Orange  x          Other: TP release to R clavicular portion of pectoralis - held        Specific Instructions for next treatment: Continue with tx per POC      Treatment Charges: Mins Units   []  Modalities     [x]  Ther Exercise 55 4   []  Manual Therapy     []  Ther Activities     []  Aquatics     [x]  Vasocompression 15 1   []  Other     Total Treatment time 70 5       Assessment: [x] Progressing toward goals. Began today's tx with UBE followed by pulleys to reduce tightness and improve mobility. Pt did note some inc sxs with UBE this date. Pt is not experiencing any catching feelings when performing flexion or scaption movements. Inc repetitions with Tband rows and SL ER. Pt requires cueing during SL ER to maintain elbow at 90 deg and to reduce compensation from trunk lean. Continued with supine wand ER to work on improving ROM. Tx concluded with vaso for reduction of sxs. [] No change. [x] Other: Pt would benefit from additional PT at 2-3x/week x additional 12 visits at this time to keep working on addressing ROM and strength deficits that are currently present, as well as continuing to work on improving functional use of UE with ADLs.- SB, PT  [x] Patient would continue to benefit from skilled physical therapy services in order to:Progress R shoulder strength, ROM, and functional use following R RTC repair    STG: (to be met in 10 treatments)  1. ? Pain: Pt to decrease pain levels to 4/10 to help improve tolerance to progression through ROM.: MET (4/20/22)  2.  Pt will self-report better tolerance to sleeping to help improve quality of sleep.:ONGOING, reports sleeping has improved, however can still have issues (22)  3. Independent with Home Exercise Programs: MET (22)     LTG: (to be met in 20 treatments)  1. Improve score of UEFS from 82% impaired to <50% impaired, demonstrating improved functional use of R UE.  2. ? ROM: Increase R UE PROM to full in all planes, allowing for transition to full AROM. 3. Decrease pain to 0/10     LT treatments  1. Pt will demonstrate R AROM equal to L to reduce need for compensations with functional use  2. Pt will demonstrate R shoulder strength to 4+/5 grossly, reducing need for compensations with use of R UE.  3. Pt will demonstrate ability to complete all overhead motion with R UE without any compensations with UT, improve mechanics with completion. 4. Improve score of UEFS from 82% impaired to <30% impaired, demonstrating improved functional use of R UE. Patient goals: \"Regain movement without pain\"    Pt. Education:  [x] Yes  [] No  [x] Reviewed Prior HEP/Ed  Method of Education: [x] Verbal  [x] Demo  [x] Written  Access Code: [email or web address]  URL: [email or web address]. com/  Date: 2022  Prepared by: [facility]    Exercises  Standing Isometric Shoulder External Rotation with Doorway - 1-2 x daily - 7 x weekly - 2 sets - 10 reps - 5 seconds hold  Standing Isometric Shoulder Internal Rotation at Doorway - 1-2 x daily - 7 x weekly - 2 sets - 10 reps - 5 seconds hold  Standing Shoulder Flexion to 90 Degrees - 3 x daily - 7 x weekly - 3 sets - 10 reps  Standing Row with Anchored Resistance - 1 x daily - 7 x weekly - 2-3 sets - 10 reps - 3 seconds hold  Standing Shoulder Internal Rotation Stretch with Towel - 3 x daily - 7 x weekly - 3 sets - 30 seconds hold    Comprehension of Education:  [x] Verbalizes understanding. [x] Demonstrates understanding. [x] Needs review. [] Demonstrates/verbalizes HEP/Ed previously given. Plan: [] Continue current frequency toward long and short term goals.     [x] Specific Instructions for subsequent treatments: Continue tx per Protocol  X   Pt would benefit from additional 12 visits at 2-3x/week- SB, PT      Time In: 9:00 am          Time Out: 10:10 am    Electronically signed by:  ELDON Aldana The above documentation completed by Paty Haywood, Student Physical Therapist Assistant, was reviewed and accepted by supervising Clinical Instructor Richard Tavera PTA.

## 2022-05-02 ENCOUNTER — HOSPITAL ENCOUNTER (OUTPATIENT)
Dept: PHYSICAL THERAPY | Facility: CLINIC | Age: 67
Setting detail: THERAPIES SERIES
Discharge: HOME | End: 2022-05-02
Payer: MEDICARE

## 2022-05-02 PROCEDURE — 97016 VASOPNEUMATIC DEVICE THERAPY: CPT

## 2022-05-02 PROCEDURE — 97110 THERAPEUTIC EXERCISES: CPT

## 2022-05-02 NOTE — FLOWSHEET NOTE
[] [facility]) - [facility]  [facility]  [address]  [phone]  F: [phone] [] [address]  [address]   [address]  P: [phone]  F: [phone] [x] [address]  Therapy  [address] [facility]  P: [phone]  F: [phone] [] [address]  P: [phone]  F: [phone] [] [address]  [facility]   [address]   [place]ree [place]: [phone]  F: [phone]      Physical Therapy Daily Treatment Note    Date:  2022  Patient Name:  Manuel Arceo    :  1955  MRN: [id number]   Physician: Juan Steward MD                   Insurance: MEDICARE ([employer]. [employer].)  Medical Diagnosis: Y58.524 - Status post right rotator cuff repair                Rehab Codes: M25.511, M25.611  Onset Date: DOS (22)                                       Next 's appt.: 22   Visit# / total visits: 21/36 (2-3x wk)                       Cancels/No Shows: 0/0    Subjective: Pt arrives to therapy with some soreness and stiffness, states she did some cleaning over the weekend that inc sxs. Pain:  - Yes  X No  Location: R shoulder, bicep    Pain Rating: (0-10 scale) 0/10  Pain altered Tx:  x No  - Yes  Action:  Comments:          Objective:  Modalities: vaso 15' low compression.    Precautions: No active Lifting, Pushing, Pulling                                   Exercises: Utilize medium RTC repair protocol  Exercise  R RTC Repair     S/P week 11 ()    Reps/ Time Weight/ Level Comments    UBE 6'  No resistance x   Pulleys 4'   Flexion, scaption x              UT S 3x30\"   x          Scapula retraction  2x15      Shrugs  2x15      Standing ext AAROM 10x10\"  Wand     Supine wand flex, ER 10x10\"   Wand  x   Wall slides 20x10\"  Flexion/scaption x Table slides 10x10\"   Flexion/scaption    PROM  10'    ER/flexion/IR                 AROM x20     Flexion x    IR towel S 3x30\"     x                 Prone ext x30   x   SL ER x30   x          Isometrics 2x10, 5\"  ER/IR, flexion x   Rhythmic stabilization 2x30\"  Flexion/ext, abd/add, IR/ER           T-band:       Rows 3x10, 3\" Orange  x          Other: TP release to R clavicular portion of pectoralis - held        Specific Instructions for next treatment: Continue with tx per POC      Treatment Charges: Mins Units   []  Modalities     [x]  Ther Exercise 55 4   []  Manual Therapy     []  Ther Activities     []  Aquatics     [x]  Vasocompression 15 1   []  Other     Total Treatment time 70 5       Assessment: [x] Progressing toward goals. Began today's tx with UBE followed by pulleys to reduce tightness and improve mobility. Inc repetitions for SL ER and prone extensions with good pt tolerance. During both SL and wand ER pt requires verbal cues to maintain shoulder adduction and the elbow at 90 degrees. Plan to reduce frequency to 2x/week. Pt instructed in importance of completing HEP to maintain and continue improving mobility. Tx concluded with vaso for reduction of sxs. [] No change. [] Other:  [x] Patient would continue to benefit from skilled physical therapy services in order to:Progress R shoulder strength, ROM, and functional use following R RTC repair    STG: (to be met in 10 treatments)  1. ? Pain: Pt to decrease pain levels to 4/10 to help improve tolerance to progression through ROM.: MET (4/20/22)  2. Pt will self-report better tolerance to sleeping to help improve quality of sleep.:ONGOING, reports sleeping has improved, however can still have issues (4/20/22)  3. Independent with Home Exercise Programs: MET (4/20/22)     LTG: (to be met in 20 treatments)  1.  Improve score of UEFS from 82% impaired to <50% impaired, demonstrating improved functional use of R UE.  2. ? ROM: Increase R UE PROM to full in all planes, allowing for transition to full AROM. 3. Decrease pain to 0/10     LT treatments  1. Pt will demonstrate R AROM equal to L to reduce need for compensations with functional use  2. Pt will demonstrate R shoulder strength to 4+/5 grossly, reducing need for compensations with use of R UE.  3. Pt will demonstrate ability to complete all overhead motion with R UE without any compensations with UT, improve mechanics with completion. 4. Improve score of UEFS from 82% impaired to <30% impaired, demonstrating improved functional use of R UE. Patient goals: \"Regain movement without pain\"    Pt. Education:  [x] Yes  [] No  [x] Reviewed Prior HEP/Ed  Method of Education: [x] Verbal  [x] Demo  [x] Written  Access Code: [email or web address]  URL: [email or web address]  Date: 2022  Prepared by: [facility]    Exercises  Standing Isometric Shoulder External Rotation with Doorway - 1-2 x daily - 7 x weekly - 2 sets - 10 reps - 5 seconds hold  Standing Isometric Shoulder Internal Rotation at Doorway - 1-2 x daily - 7 x weekly - 2 sets - 10 reps - 5 seconds hold  Standing Shoulder Flexion to 90 Degrees - 3 x daily - 7 x weekly - 3 sets - 10 reps  Standing Row with Anchored Resistance - 1 x daily - 7 x weekly - 2-3 sets - 10 reps - 3 seconds hold  Standing Shoulder Internal Rotation Stretch with Towel - 3 x daily - 7 x weekly - 3 sets - 30 seconds hold    Comprehension of Education:  [x] Verbalizes understanding. [x] Demonstrates understanding. [x] Needs review. [] Demonstrates/verbalizes HEP/Ed previously given. Plan: [x] Continue current frequency toward long and short term goals.     [x] Specific Instructions for subsequent treatments: Continue tx per Protocol      Time In: 9:00 am          Time Out: 10:10 am    Electronically signed by:  Evangelina Ahumada, SPTA The above documentation completed by Evangelina Ahumada, Student Physical Therapist Assistant, was reviewed and accepted by supervising Clinical Instructor Jose ramos, PTA.

## 2022-05-04 ENCOUNTER — HOSPITAL ENCOUNTER (OUTPATIENT)
Dept: PHYSICAL THERAPY | Facility: CLINIC | Age: 67
Setting detail: THERAPIES SERIES
Discharge: HOME | End: 2022-05-04
Payer: MEDICARE

## 2022-05-04 PROCEDURE — 97016 VASOPNEUMATIC DEVICE THERAPY: CPT

## 2022-05-04 PROCEDURE — 97110 THERAPEUTIC EXERCISES: CPT

## 2022-05-04 NOTE — FLOWSHEET NOTE
[] [facility]) - [facility] &  Therapy  [address]  [phone]  F: [phone] [] [address]  [address]   [address]  P: [phone]  F: [phone] [x] [address]  Therapy  [address] [facility] [address]  P: [phone]  F: [phone] [] [address]  P: [phone]  F: [phone] [] [address]  [facility]   [address]   [place]ani [place]: [phone]  F: [phone]      Physical Therapy Daily Treatment Note    Date:  2022  Patient Name:  Giovanny Plascencia    :  1955  MRN: [id number]   Physician: Sonu Walton MD                   Insurance: MEDICARE ([employer])  Medical Diagnosis: U07.203 - Status post right rotator cuff repair                Rehab Codes: G30.152, M25.611  Onset Date: DOS (22)                                       Next 's appt.: 22   Visit# / total visits:  (2-3x wk)                       Cancels/No Shows: 0/0    Subjective: Pt arrives to therapy with the usual morning stiffness and explains that there is a slight pulling feeling in the bicep. No c/o pain. Pain:  - Yes  X No  Location: R shoulder, bicep    Pain Rating: (0-10 scale) 0/10  Pain altered Tx:  x No  - Yes  Action:  Comments:          Objective:  Modalities: vaso 15' low compression.    Precautions: No active Lifting, Pushing, Pulling                                   Exercises: Utilize medium RTC repair protocol  Exercise  R RTC Repair     S/P week 11 ()    Reps/ Time Weight/ Level Comments    UBE 6'  No resistance x   Pulleys 4'   Flexion, scaption x              UT S 3x30\"   x   IR towel S 3x30\"   x          Scapula retraction  2x15      Shrugs  2x15      Standing ext AAROM 10x10\"  Wand     Supine wand flex, ER 10x10\"   Wand  x   Wall slides 20x10\"  Flexion/scaption x   Table slides 10x10\"   Flexion/scaption    PROM  10'   ER/flexion/IR     AROM x20    Flexion/scaption x   Prone ext x30      SL ER x30   x          Isometrics 2x10, 5\"  ER/IR, flexion    Rhythmic stabilization 2x30\"  Flexion/ext, abd/add, IR/ER x   PB wall 15x ea Red PB cw, ccw, side-side, up-down, protraction x          T-band:       Rows 3x10, 3\" Orange  x   Extension 2x10 Orange  x   Curls 2x10 Orange  x   Tricep ext 2x10 Orange  x          Other: TP release to R clavicular portion of pectoralis - held        Specific Instructions for next treatment: Continue with tx per POC      Treatment Charges: Mins Units   []  Modalities     [x]  Ther Exercise 60 4   []  Manual Therapy     []  Ther Activities     []  Aquatics     [x]  Vasocompression 15 1   []  Other     Total Treatment time 75 5       Assessment: [x] Progressing toward goals. Began today's tx with UBE followed by pulleys to reduce tightness and improve mobility. Progressed exercises with the addition of PB on wall and Tband curls, tricep ext, and shoulder extension to continue improving strength and stability of shoulder musculature. Verbal cues needed during tricep ext to stabilize the shoulder as well as cueing during PB on wall to maintain upright posture and keep elbow extended, good follow through. Handout given for new exercises to be added to HEP. Continues to require verbal cues during ER exercises to maintain elbow in 90 deg flexion. Pt noted some inc soreness/fatigue with additional exercises. Tx concluded with vaso for sxs reduction. [] No change. [] Other:  [x] Patient would continue to benefit from skilled physical therapy services in order to:Progress R shoulder strength, ROM, and functional use following R RTC repair    STG: (to be met in 10 treatments)  1. ? Pain: Pt to decrease pain levels to 4/10 to help improve tolerance to progression through ROM.: MET (4/20/22)  2.  Pt will self-report better tolerance to sleeping to help improve quality of sleep.:ONGOING, reports sleeping has improved, however can still have issues (22)  3. Independent with Home Exercise Programs: MET (22)     LTG: (to be met in 20 treatments)  1. Improve score of UEFS from 82% impaired to <50% impaired, demonstrating improved functional use of R UE.  2. ? ROM: Increase R UE PROM to full in all planes, allowing for transition to full AROM. 3. Decrease pain to 0/10     LT treatments  1. Pt will demonstrate R AROM equal to L to reduce need for compensations with functional use  2. Pt will demonstrate R shoulder strength to 4+/5 grossly, reducing need for compensations with use of R UE.  3. Pt will demonstrate ability to complete all overhead motion with R UE without any compensations with UT, improve mechanics with completion. 4. Improve score of UEFS from 82% impaired to <30% impaired, demonstrating improved functional use of R UE. Patient goals: \"Regain movement without pain\"    Pt. Education:  [x] Yes  [] No  [x] Reviewed Prior HEP/Ed  Method of Education: [x] Verbal  [x] Demo  [x] Written  Access Code: [email or web address]  URL: [email or web address]  Date: 2022  Prepared by: [facility]    Exercises  Standing Isometric Shoulder External Rotation with Doorway - 1-2 x daily - 7 x weekly - 2 sets - 10 reps - 5 seconds hold  Standing Isometric Shoulder Internal Rotation at Doorway - 1-2 x daily - 7 x weekly - 2 sets - 10 reps - 5 seconds hold  Standing Shoulder Flexion to 90 Degrees - 3 x daily - 7 x weekly - 3 sets - 10 reps  Standing Row with Anchored Resistance - 1 x daily - 7 x weekly - 2-3 sets - 10 reps - 3 seconds hold  Standing Shoulder Internal Rotation Stretch with Towel - 3 x daily - 7 x weekly - 3 sets - 30 seconds hold    Comprehension of Education:  [x] Verbalizes understanding. [x] Demonstrates understanding. [x] Needs review. [] Demonstrates/verbalizes HEP/Ed previously given.      Plan: [x] Continue current frequency toward long and short term goals. [x] Specific Instructions for subsequent treatments: Continue tx per Protocol      Time In: 9:00 am          Time Out: 10:15 am    Electronically signed by:  ELDON Tanner The above documentation completed by Andrew Doe, Student Physical Therapist Assistant, was reviewed and accepted by supervising Clinical Instructor Kita Severs, PTA.

## 2022-05-06 ENCOUNTER — HOSPITAL ENCOUNTER (OUTPATIENT)
Dept: PHYSICAL THERAPY | Facility: CLINIC | Age: 67
Setting detail: THERAPIES SERIES
Discharge: HOME | End: 2022-05-06
Payer: MEDICARE

## 2022-05-06 PROCEDURE — 97110 THERAPEUTIC EXERCISES: CPT

## 2022-05-06 PROCEDURE — 97016 VASOPNEUMATIC DEVICE THERAPY: CPT

## 2022-05-06 NOTE — FLOWSHEET NOTE
[] [facility]) - [facility] &  Therapy  [address]  [phone]  F: [phone] [] [address]  [address]   [address]  P: [phone]  F: [phone] [x] [address]  Therapy  [address] [address]  P: [phone]  F: [phone] [] [address]  P: [phone]  F: [phone] [] [address]  [facility]   [address]   [place]: [phone]  F: [phone]      Physical Therapy Daily Treatment Note    Date:  2022  Patient Name:  Paula Young    :  1955  MRN: [id number]   Physician: Elisa Neville MD                   Insurance: Lorayne Puna ([employer]  Medical Diagnosis: J53.832 - Status post right rotator cuff repair                Rehab Codes: B83.106, M25.611  Onset Date: DOS (22)                                       Next 's appt.: 22   Visit# / total visits:  (2-3x wk)                       Cancels/No Shows: 0/0    Subjective:    Pain:  - Yes  X No  Location: R shoulder, bicep    Pain Rating: (0-10 scale) 0/10  Pain altered Tx:  x No  - Yes  Action:  Comments: Pt mentioned that she is a little sore         Objective:  Modalities: vaso 15' low compression.    Precautions: No active Lifting, Pushing, Pulling                                   Exercises: Utilize medium RTC repair protocol  Exercise  R RTC Repair     S/P week 11 (Tu)    Reps/ Time Weight/ Level Comments    UBE 6'  No resistance x   Pulleys 4'   Flexion, scaption x              UT S 3x30\"   x   IR towel S 3x30\"   x          Supine wand flex, ER 10x10\"   Wand  x   Wall slides 10x10\"  Flexion/scaption x   PROM  10'   ER/flexion/IR     AROM x20    Flexion/scaption x   Prone ext x30   x   SL ER x30   x          PB wall 15x ea Red PB cw, ccw, side-side, up-down, protraction x          T-band:       Rows 3x10, 3\" Orange  x   Extension 2x10 Orange  x   Curls 2x10 Orange  x   Tricep ext 2x10 Orange  x          Other:          Specific Instructions for next treatment: Continue with tx per POC      Treatment Charges: Mins Units   []  Modalities     [x]  Ther Exercise 50 3   []  Manual Therapy     []  Ther Activities     []  Aquatics     [x]  Vasocompression 15 1   []  Other     Total Treatment time 65 4       Assessment: [x] Progressing toward goals. Pt able to continue to utilize warm up and stretches to aide in loosening tight structures. Continued with AAROM exercises to help increase ROM. Pt most difficulty with scap exercises and ER movements. Min cueing verbally for proper technique with body mechanics. Finished session with vaso for symptom relief. [] No change. [] Other:  [x] Patient would continue to benefit from skilled physical therapy services in order to:Progress R shoulder strength, ROM, and functional use following R RTC repair    STG: (to be met in 10 treatments)  1. ? Pain: Pt to decrease pain levels to 4/10 to help improve tolerance to progression through ROM.: MET (22)  2. Pt will self-report better tolerance to sleeping to help improve quality of sleep.:ONGOING, reports sleeping has improved, however can still have issues (22)  3. Independent with Home Exercise Programs: MET (22)     LTG: (to be met in 20 treatments)  1. Improve score of UEFS from 82% impaired to <50% impaired, demonstrating improved functional use of R UE.  2. ? ROM: Increase R UE PROM to full in all planes, allowing for transition to full AROM. 3. Decrease pain to 0/10     LT treatments  1. Pt will demonstrate R AROM equal to L to reduce need for compensations with functional use  2.  Pt will demonstrate R shoulder strength to 4+/5 grossly, reducing need for compensations with use of R UE.  3. Pt will demonstrate ability to complete all overhead motion with R UE without any compensations with UT, improve mechanics with completion. 4. Improve score of UEFS from 82% impaired to <30% impaired, demonstrating improved functional use of R UE. Patient goals: \"Regain movement without pain\"    Pt. Education:  [x] Yes  [] No  [x] Reviewed Prior HEP/Ed  Method of Education: [x] Verbal  [x] Demo  [x] Written  Access Code: [email or web address]  URL: [email or web address]  Date: 04/20/2022  Prepared by: [facility]    Exercises  Standing Isometric Shoulder External Rotation with Doorway - 1-2 x daily - 7 x weekly - 2 sets - 10 reps - 5 seconds hold  Standing Isometric Shoulder Internal Rotation at Doorway - 1-2 x daily - 7 x weekly - 2 sets - 10 reps - 5 seconds hold  Standing Shoulder Flexion to 90 Degrees - 3 x daily - 7 x weekly - 3 sets - 10 reps  Standing Row with Anchored Resistance - 1 x daily - 7 x weekly - 2-3 sets - 10 reps - 3 seconds hold  Standing Shoulder Internal Rotation Stretch with Towel - 3 x daily - 7 x weekly - 3 sets - 30 seconds hold    Comprehension of Education:  [x] Verbalizes understanding. [x] Demonstrates understanding. [x] Needs review. [] Demonstrates/verbalizes HEP/Ed previously given. Plan: [x] Continue current frequency toward long and short term goals.     [x] Specific Instructions for subsequent treatments: Continue tx per Protocol      Time In: 9:00 am          Time Out: 10:15 am    Electronically signed by:  Tami Thakkar PTA

## 2022-05-09 ENCOUNTER — HOSPITAL ENCOUNTER (OUTPATIENT)
Dept: PHYSICAL THERAPY | Facility: CLINIC | Age: 67
Setting detail: THERAPIES SERIES
End: 2022-05-09
Payer: MEDICARE

## 2022-05-10 ENCOUNTER — HOSPITAL ENCOUNTER (OUTPATIENT)
Dept: PHYSICAL THERAPY | Facility: CLINIC | Age: 67
Setting detail: THERAPIES SERIES
Discharge: HOME | End: 2022-05-10
Payer: MEDICARE

## 2022-05-10 PROCEDURE — 97016 VASOPNEUMATIC DEVICE THERAPY: CPT

## 2022-05-10 PROCEDURE — 97110 THERAPEUTIC EXERCISES: CPT

## 2022-05-10 NOTE — FLOWSHEET NOTE
[] [facility]) - [facility] &  Therapy  [address]  [phone]  F: [phone] [] [address]  [place] [address]   [address]  P: [phone]  F: [phone] [x] [address]  Therapy  [address] [facility] [facility] [address]  P: [phone]  F: [phone] [] [address]  P: [phone]  F: [phone] [] [address]  [facility]   [address]   [place]: [phone]  F: [phone]      Physical Therapy Daily Treatment Note    Date:  5/10/2022  Patient Name:  Duane Ortiz    :  1955  MRN: [id number]   Physician: Kalin Arguelles MD                   Insurance: MEDICARE ([employer]  Medical Diagnosis: G37.685 - Status post right rotator cuff repair                Rehab Codes: D45.378, M25.611  Onset Date: DOS (22)                                       Next 's appt.: 22   Visit# / total visits:  (2-3x wk)                       Cancels/No Shows: 0/0    Subjective:    Pain:  - Yes  X No  Location: R shoulder, bicep    Pain Rating: (0-10 scale) 0/10  Pain altered Tx:  x No  - Yes  Action:  Comments: Pt stated that she is feeling good today. Objective:  Modalities: vaso 15' low compression.    Precautions: No active Lifting, Pushing, Pulling                                   Exercises: Utilize medium RTC repair protocol  Exercise  R RTC Repair     S/P week 11 (Tu)    Reps/ Time Weight/ Level Comments    UBE 10'  No resistance x   Pulleys 4'   Flexion, scaption x              UT S 3x30\"       IR towel S 3x30\"   x          Supine wand flex, ER 10x10\"   Wand     Wall slides 10x10\"  Flexion/scaption x   Rainbows on wall 10x   x           AROM x20    Flexion/scaption x   Prone ext x30   x   Prone row x30   x   SL abduction x10   x   SL ER x30   x          PB wall 20x ea Red PB cw, ccw, side-side, up-down, protraction x          T-band:       Rows 3x10, 3\" Dick[place]     Extension 2x10 Orange  x   Curls 2x10 Orange     Tricep ext 2x10 Orange  x          Other:          Specific Instructions for next treatment: Continue with tx per POC      Treatment Charges: Mins Units   []  Modalities     [x]  Ther Exercise 50 3   []  Manual Therapy     []  Ther Activities     []  Aquatics     [x]  Vasocompression 15 1   []  Other     Total Treatment time 65 4       Assessment: [x] Progressing toward goals. Pt demo improvement with all exercises with less soreness and symptoms though out. Added in half [employer] towel slides on wall to place pt in new ROM. Added in prone rows and side lying abduction to further work on scapular stability. Continued with vaso at end.      [] No change. [] Other:  [x] Patient would continue to benefit from skilled physical therapy services in order to:Progress R shoulder strength, ROM, and functional use following R RTC repair    STG: (to be met in 10 treatments)  1. ? Pain: Pt to decrease pain levels to 4/10 to help improve tolerance to progression through ROM.: MET (22)  2. Pt will self-report better tolerance to sleeping to help improve quality of sleep.:ONGOING, reports sleeping has improved, however can still have issues (22)  3. Independent with Home Exercise Programs: MET (22)     LTG: (to be met in 20 treatments)  1. Improve score of UEFS from 82% impaired to <50% impaired, demonstrating improved functional use of R UE.  2. ? ROM: Increase R UE PROM to full in all planes, allowing for transition to full AROM. 3. Decrease pain to 0/10     LT treatments  1. Pt will demonstrate R AROM equal to L to reduce need for compensations with functional use  2.  Pt will demonstrate R shoulder strength to 4+/5 grossly, reducing need for compensations with use of R UE.  3. Pt will demonstrate ability to complete all overhead motion with R UE without any compensations with UT, improve mechanics with completion. 4. Improve score of UEFS from 82% impaired to <30% impaired, demonstrating improved functional use of R UE. Patient goals: \"Regain movement without pain\"    Pt. Education:  [x] Yes  [] No  [x] Reviewed Prior HEP/Ed  Method of Education: [x] Verbal  [x] Demo  [x] Written  Access Code: [email or web address]  URL: [email or web address]  Date: 04/20/2022  Prepared by: [facility]    Exercises  Standing Isometric Shoulder External Rotation with Doorway - 1-2 x daily - 7 x weekly - 2 sets - 10 reps - 5 seconds hold  Standing Isometric Shoulder Internal Rotation at Doorway - 1-2 x daily - 7 x weekly - 2 sets - 10 reps - 5 seconds hold  Standing Shoulder Flexion to 90 Degrees - 3 x daily - 7 x weekly - 3 sets - 10 reps  Standing Row with Anchored Resistance - 1 x daily - 7 x weekly - 2-3 sets - 10 reps - 3 seconds hold  Standing Shoulder Internal Rotation Stretch with Towel - 3 x daily - 7 x weekly - 3 sets - 30 seconds hold    Comprehension of Education:  [x] Verbalizes understanding. [x] Demonstrates understanding. [x] Needs review. [] Demonstrates/verbalizes HEP/Ed previously given. Plan: [x] Continue current frequency toward long and short term goals.     [x] Specific Instructions for subsequent treatments: Continue tx per Protocol      Time In: 8:59 am          Time Out: 10:10 am    Electronically signed by:  Lola Lomax PTA

## 2022-05-12 ENCOUNTER — HOSPITAL ENCOUNTER (OUTPATIENT)
Dept: PHYSICAL THERAPY | Facility: CLINIC | Age: 67
Setting detail: THERAPIES SERIES
Discharge: HOME | End: 2022-05-12
Payer: MEDICARE

## 2022-05-12 PROCEDURE — 97016 VASOPNEUMATIC DEVICE THERAPY: CPT

## 2022-05-12 PROCEDURE — 97110 THERAPEUTIC EXERCISES: CPT

## 2022-05-12 NOTE — FLOWSHEET NOTE
[] [facility]) - [facility] &  Therapy  [address]  [phone]  F: [phone] [] [address]  [place] [address]   [address]  P: [phone]  F: [phone] [x] [address] &  Therapy  [address] [facility]  P: [phone]  F: [phone] [] [address]  P: [phone]  F: [phone] [] [address]  [facility]   [address]   [place]: [phone]  F: [phone]      Physical Therapy Daily Treatment Note    Date:  2022  Patient Name:  Hosey Lesches    :  1955  MRN: [id number]   Physician: Reed Davis MD                   Insurance: MEDICARE ([employer]. [employer]  Medical Diagnosis: R10.703 - Status post right rotator cuff repair                Rehab Codes: I54.493, M25.611  Onset Date: DOS (22)                                       Next 's appt.: 22   Visit# / total visits: 36 (2-3x wk)                       Cancels/No Shows: 0/0    Subjective:    Pain:  - Yes  X No  Location: R shoulder, bicep    Pain Rating: (0-10 scale) 0/10  Pain altered Tx:  x No  - Yes  Action:  Comments: Pt stated that she is feeling good today. Objective:  Modalities: vaso 15' low compression.    Precautions: No active Lifting, Pushing, Pulling                                   Exercises: Utilize medium RTC repair protocol  Exercise  R RTC Repair     S/P week 11 (Tu)    Reps/ Time Weight/ Level Comments    UBE 10'  No resistance x   Pulleys 4'   Flexion, scaption x              IR towel S 3x30\"   x          Wall slides 10x10\"  Flexion/scaption x   Rainbows on wall 10x   x          Prone ext x30   x   Prone row x30   x   SL abduction x20   x   SL ER x30   x          PB wall flexion and abduction 20x ea Red PB cw, ccw, side-side, up-down, protraction x          T-band:       Rows 3x10, 3\" Orange  x   Extension 2x10 Orange  x   Curls 2x10 Orange  x   Tricep ext 2x10 Orange  x          AROM       Flexion  2x10   x   Abduction  2x10   x   Other:          Specific Instructions for next treatment: Continue with tx per POC      Treatment Charges: Mins Units   []  Modalities     [x]  Ther Exercise 47 3   []  Manual Therapy     []  Ther Activities     []  Aquatics     [x]  Vasocompression 15 1   []  Other     Total Treatment time 62 4       Assessment: [x] Progressing toward goals. Pt able to demo good exercise technique but required min/mod verbal cueing for exercise recall. Added in abduction ball on the wall to work in a elevated UE position with scapular stability. Reviewed over HEP for answers on performance and technique. Finished with vaso. [] No change. [] Other:  [x] Patient would continue to benefit from skilled physical therapy services in order to:Progress R shoulder strength, ROM, and functional use following R RTC repair    STG: (to be met in 10 treatments)  1. ? Pain: Pt to decrease pain levels to 4/10 to help improve tolerance to progression through ROM.: MET (22)  2. Pt will self-report better tolerance to sleeping to help improve quality of sleep.:ONGOING, reports sleeping has improved, however can still have issues (22)  3. Independent with Home Exercise Programs: MET (22)     LTG: (to be met in 20 treatments)  1. Improve score of UEFS from 82% impaired to <50% impaired, demonstrating improved functional use of R UE.  2. ? ROM: Increase R UE PROM to full in all planes, allowing for transition to full AROM. 3. Decrease pain to 0/10     LT treatments  1. Pt will demonstrate R AROM equal to L to reduce need for compensations with functional use  2.  Pt will demonstrate R shoulder strength to 4+/5 grossly, reducing need for compensations with use of R UE.  3. Pt will demonstrate ability to complete all overhead motion with R UE without any compensations with UT, improve mechanics with completion. 4. Improve score of UEFS from 82% impaired to <30% impaired, demonstrating improved functional use of R UE. Patient goals: \"Regain movement without pain\"    Pt. Education:  [x] Yes  [] No  [x] Reviewed Prior HEP/Ed  Method of Education: [x] Verbal  [x] Demo  [x] Written  Access Code: [email or web address]  URL: "[email or web address]/  Date: 04/20/2022  Prepared by: [facility]    Exercises  Standing Isometric Shoulder External Rotation with Doorway - 1-2 x daily - 7 x weekly - 2 sets - 10 reps - 5 seconds hold  Standing Isometric Shoulder Internal Rotation at Doorway - 1-2 x daily - 7 x weekly - 2 sets - 10 reps - 5 seconds hold  Standing Shoulder Flexion to 90 Degrees - 3 x daily - 7 x weekly - 3 sets - 10 reps  Standing Row with Anchored Resistance - 1 x daily - 7 x weekly - 2-3 sets - 10 reps - 3 seconds hold  Standing Shoulder Internal Rotation Stretch with Towel - 3 x daily - 7 x weekly - 3 sets - 30 seconds hold    Comprehension of Education:  [x] Verbalizes understanding. [x] Demonstrates understanding. [x] Needs review. [] Demonstrates/verbalizes HEP/Ed previously given. Plan: [x] Continue current frequency toward long and short term goals.     [x] Specific Instructions for subsequent treatments: Continue tx per Protocol      Time In: 9:01 am          Time Out: 10:10am     Electronically signed by:  Britney Woodall PTA

## 2022-05-13 ENCOUNTER — OFFICE VISIT (OUTPATIENT)
Dept: ORTHOPEDIC SURGERY | Age: 67
End: 2022-05-13
Payer: MEDICARE

## 2022-05-13 DIAGNOSIS — M17.11 OSTEOARTHRITIS OF RIGHT KNEE, UNSPECIFIED OSTEOARTHRITIS TYPE: Primary | ICD-10-CM

## 2022-05-13 PROCEDURE — G8399 PT W/DXA RESULTS DOCUMENT: HCPCS | Performed by: ORTHOPAEDIC SURGERY

## 2022-05-13 PROCEDURE — 1123F ACP DISCUSS/DSCN MKR DOCD: CPT | Performed by: ORTHOPAEDIC SURGERY

## 2022-05-13 PROCEDURE — G8427 DOCREV CUR MEDS BY ELIG CLIN: HCPCS | Performed by: ORTHOPAEDIC SURGERY

## 2022-05-13 PROCEDURE — 99212 OFFICE O/P EST SF 10 MIN: CPT | Performed by: ORTHOPAEDIC SURGERY

## 2022-05-13 PROCEDURE — 4040F PNEUMOC VAC/ADMIN/RCVD: CPT | Performed by: ORTHOPAEDIC SURGERY

## 2022-05-13 PROCEDURE — 3017F COLORECTAL CA SCREEN DOC REV: CPT | Performed by: ORTHOPAEDIC SURGERY

## 2022-05-13 PROCEDURE — G8417 CALC BMI ABV UP PARAM F/U: HCPCS | Performed by: ORTHOPAEDIC SURGERY

## 2022-05-13 PROCEDURE — 1090F PRES/ABSN URINE INCON ASSESS: CPT | Performed by: ORTHOPAEDIC SURGERY

## 2022-05-13 PROCEDURE — 1036F TOBACCO NON-USER: CPT | Performed by: ORTHOPAEDIC SURGERY

## 2022-05-13 RX ORDER — MELOXICAM 15 MG/1
15 TABLET ORAL DAILY
Qty: 30 TABLET | Refills: 3 | Status: SHIPPED | OUTPATIENT
Start: 2022-05-13 | End: 2022-06-06 | Stop reason: SDUPTHER

## 2022-05-13 NOTE — PROGRESS NOTES
Danny Winter returns today. 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 She was here to get an injection today however she thought her last injection was in this past January where in fact it was done on March 18 which is only 8 weeks ago. Therefore be too soon to get another injection.     I did not do a repeat examination today    Impression DJD right knee    Plan  I did inform the patient that she can come back in mid June which would be 3 months from her last injection in the meantime I thought it be good for her to start up on a regular anti-inflammatory she does take an Aleve occasionally however I think going on a daily regimen of Mobic may be more helpful or to send her a prescription for Mobic and seen back here in mid June

## 2022-05-17 ENCOUNTER — HOSPITAL ENCOUNTER (OUTPATIENT)
Dept: PHYSICAL THERAPY | Facility: CLINIC | Age: 67
Setting detail: THERAPIES SERIES
Discharge: HOME | End: 2022-05-17
Payer: MEDICARE

## 2022-05-17 PROCEDURE — 97016 VASOPNEUMATIC DEVICE THERAPY: CPT

## 2022-05-17 PROCEDURE — 97110 THERAPEUTIC EXERCISES: CPT

## 2022-05-17 NOTE — FLOWSHEET NOTE
[] [facility]) - [facility] &  Therapy  [address]  [phone]  F: [phone] [] [address]  [address]   [address]  P: [phone]  F: [phone] [x] [address] &  Therapy  [address]  P: [phone]  F: [phone] [] [address]  P: [phone]  F: [phone] [] [address]  [facility]   [address]   [place]: [phone]  F: [phone]      Physical Therapy Daily Treatment Note    Date:  2022  Patient Name:  Duane Ortiz    :  1955  MRN: [id number]   Physician: Kalin Arguelles MD                   Insurance: MEDICARE ([employer]  Medical Diagnosis: L06.243 - Status post right rotator cuff repair                Rehab Codes: W74.116, M25.611  Onset Date: DOS (22)                                       Next 's appt.: 22   Visit# / total visits:  (2-3x wk)                       Cancels/No Shows: 0/0    Subjective:    Pain:  - Yes  X No  Location: R shoulder, bicep    Pain Rating: (0-10 scale) 0/10  Pain altered Tx:  x No  - Yes  Action:  Comments: Pt stated that she is a little sore today. Objective:  Modalities: vaso 15' low compression.    Precautions: No active Lifting, Pushing, Pulling                                   Exercises: Utilize medium RTC repair protocol  Exercise  R RTC Repair     S/P week 11 (Tu)    Reps/ Time Weight/ Level Comments    UBE 10'  No resistance x   Pulleys 4'   Flexion, scaption x   IR stretch 3x30\"     x          Wall slides 10x10\"  Flexion/scaption x   Rainbows on wall 10x  Facing wall and side of wall x          Prone ext x30   x   Prone row x30   x   SL abduction x30   x   SL ER x30   x          PB wall flexion and abduction 20x ea Red PB cw, ccw, side-side, up-down, protraction x          T-band:       Rows 3x10, 3\" Lime       Extension 2x10 Lime       Curls 2x10 Lime       Tricep ext 2x10 Lime              AROM       Flexion  2x10   x   Abduction  2x10   x   Other:          Specific Instructions for next treatment: Continue with tx per POC      Treatment Charges: Mins Units   []  Modalities     [x]  Ther Exercise 42 3   []  Manual Therapy     []  Ther Activities     []  Aquatics     [x]  Vasocompression 15 1   []  Other     Total Treatment time 57 4       Assessment: [x] Progressing toward goals. Pt able to complete all stretches and AAROM exercises. After performing her table exercises pt became dizzy upon standing. Pt mentioned that she has been having dizzy spells for the last week. After a standing wall supported rest break per pt request, pt still had some dizziness. Held standing band exercises today and had pt sit for vaso. Referred pt to call doctor about dizzy spells. [] No change. [] Other:  [x] Patient would continue to benefit from skilled physical therapy services in order to:Progress R shoulder strength, ROM, and functional use following R RTC repair    STG: (to be met in 10 treatments)  1. ? Pain: Pt to decrease pain levels to 4/10 to help improve tolerance to progression through ROM.: MET (22)  2. Pt will self-report better tolerance to sleeping to help improve quality of sleep.:ONGOING, reports sleeping has improved, however can still have issues (22)  3. Independent with Home Exercise Programs: MET (22)     LTG: (to be met in 20 treatments)  1. Improve score of UEFS from 82% impaired to <50% impaired, demonstrating improved functional use of R UE.  2. ? ROM: Increase R UE PROM to full in all planes, allowing for transition to full AROM. 3. Decrease pain to 0/10     LT treatments  1. Pt will demonstrate R AROM equal to L to reduce need for compensations with functional use  2.  Pt will demonstrate R shoulder strength to 4+/5 grossly, reducing need for compensations with use of R UE.  3. Pt will demonstrate ability to complete all overhead motion with R UE without any compensations with UT, improve mechanics with completion. 4. Improve score of UEFS from 82% impaired to <30% impaired, demonstrating improved functional use of R UE. Patient goals: \"Regain movement without pain\"    Pt. Education:  [x] Yes  [] No  [x] Reviewed Prior HEP/Ed  Method of Education: [x] Verbal  [x] Demo  [x] Written  Access Code: [email or web address]  URL: [email or web address]  Date: 04/20/2022  Prepared by: [facility]    Exercises  Standing Isometric Shoulder External Rotation with Doorway - 1-2 x daily - 7 x weekly - 2 sets - 10 reps - 5 seconds hold  Standing Isometric Shoulder Internal Rotation at Doorway - 1-2 x daily - 7 x weekly - 2 sets - 10 reps - 5 seconds hold  Standing Shoulder Flexion to 90 Degrees - 3 x daily - 7 x weekly - 3 sets - 10 reps  Standing Row with Anchored Resistance - 1 x daily - 7 x weekly - 2-3 sets - 10 reps - 3 seconds hold  Standing Shoulder Internal Rotation Stretch with Towel - 3 x daily - 7 x weekly - 3 sets - 30 seconds hold    Comprehension of Education:  [x] Verbalizes understanding. [x] Demonstrates understanding. [x] Needs review. [] Demonstrates/verbalizes HEP/Ed previously given. Plan: [x] Continue current frequency toward long and short term goals.     [x] Specific Instructions for subsequent treatments: Continue tx per Protocol      Time In: 9:00 am          Time Out: 10:05am      Electronically signed by:  Alexandrea Barraza PTA

## 2022-05-18 ENCOUNTER — OFFICE VISIT (OUTPATIENT)
Dept: ORTHOPEDIC SURGERY | Age: 67
End: 2022-05-18

## 2022-05-18 VITALS — WEIGHT: 163.8 LBS | BODY MASS INDEX: 27.29 KG/M2 | HEIGHT: 65 IN

## 2022-05-18 DIAGNOSIS — Z98.890 STATUS POST ROTATOR CUFF REPAIR: Primary | ICD-10-CM

## 2022-05-18 PROCEDURE — 99024 POSTOP FOLLOW-UP VISIT: CPT | Performed by: ORTHOPAEDIC SURGERY

## 2022-05-19 ENCOUNTER — HOSPITAL ENCOUNTER (OUTPATIENT)
Dept: PHYSICAL THERAPY | Facility: CLINIC | Age: 67
Setting detail: THERAPIES SERIES
Discharge: HOME | End: 2022-05-19
Payer: MEDICARE

## 2022-05-19 PROCEDURE — 97016 VASOPNEUMATIC DEVICE THERAPY: CPT

## 2022-05-19 PROCEDURE — 97110 THERAPEUTIC EXERCISES: CPT

## 2022-05-19 NOTE — PRE-CERTIFICATION NOTE
Medicare Cap   [x] Physical Therapy  [] Speech Therapy  [] Occupational therapy  *PT and Speech caps combine      $2150 Limit for PT and Speech combined  $2150 Limit for OT individually  At the beginning of the month where you expect to go over $2150, please add the [address] [place] [address] modifier      Patient Name: Yarelis Schreiberb: 1955    Note:  This is an estimate of charges billed. Date of [address] Name # units/ charge $$$ charge Daily Total Charge Ongoing Total $$$   3/14/22 Eval, therex 1/1 98.01+22.84+9.21 130.06 130.06   3/16/22 2TE,man,vaso 2,1,1 29.21+22.84+21.34+9.21 82.60 212.66   3/18/22 3TE, man  29.21+22.84+22.84+21.34 96.23 308.89   3/21/22 2TE, vaso 2,1 29.21+22.84+9.21 61.26 370.50   3/23/22 2TE,vaso 2,1  61.26 431.41   3/25/22 2TE, 1 vaso 2/1  61.26 492.67   3/28/22 TE, Vaso 2/1  61.26 553.93   3/30/22 TE, Vaso 3/1  84.10 638.03   4/1/22 TE, vaso 2/1  61.26 699.29   4/4/22 TE, vaso 2/1  61.26 760.55   4/6/22 TE, vaso 2/1  61.26 821.81   4/8/22 TE, vaso 3/1  84.10 905.91   4/11/22 TE, vaso 3/1  84.10 990.01   4/13/22 TE, vaso 3/1  84.10 1074.11   4/14/22 TE, vaso 3/1  84.10 1158.21   4/18/22 TE, vaso 3/1  84.10 1242.31   4/20/22 TE, vaso 3/1  84.10 1326.41   4/22/22 TE, vaso 3/1  84.10 1410.51   4/25/22 TE, vaso 3/1  84.10 1494.61   4/27/22 TE, vaso 4/1  106.94 1601.65   4/29/22 TE, vaso 4/1  106.94 1708.49   5/2/22 TE, vaso 4/1  106.94 1815.43   5/4/22 TE, vaso 4/1  106.94 1922.37   5/6/22 TE, vaso 3/1  84.10 2006.47   5/10/22 TE, vaso 3/1  84.10 2090.57   5/12/22 TE, vsao 3/1  84.10 2174.67-KX   5/17/22 TE, vaso 3/1  84.10 [address]

## 2022-05-19 NOTE — FLOWSHEET NOTE
[] [facility]) - [facility] &  Therapy  [address]  [phone]  F: [phone] [] [address]  [address]   [address]  P: [phone]  F: [phone] [x] [address] &  Therapy  [address]  P: [phone]  F: [phone] [] [address]  P: [phone]  F: [phone] [] [address]  [facility]   [address]   [place]: [phone]  F: [phone]      Physical Therapy Daily Treatment Note    Date:  2022  Patient Name:  Lewis Brown    :  1955  MRN: [id number]   Physician: Janna Jacobson MD                   Insurance: MEDICARE ([employer]  Medical Diagnosis: Y04.601 - Status post right rotator cuff repair                Rehab Codes: R04.888, M25.611  Onset Date: DOS (22)                                       Next 's appt.: 22   Visit# / total visits: 36 (2-3x wk)                       Cancels/No Shows: 0/0    Subjective:    Pain:  - Yes  X No  Location: R shoulder, bicep    Pain Rating: (0-10 scale) 0/10  Pain altered Tx:  x No  - Yes  Action:  Comments: Pt mentioned that doctor wishes for her strengthening program to be increased. Objective:  Modalities: vaso 15' low compression.    Precautions: No active Lifting, Pushing, Pulling                                   Exercises: Utilize medium RTC repair protocol  Exercise  R RTC Repair     S/P week 11 ()    Reps/ Time Weight/ Level Comments    UBE 10'  No resistance x   Pulleys 4'   Flexion, scaption x   IR stretch 3x30\"     x          Rainbows on wall 10x  Facing wall and side of wall x          Prone ext x30 1#  x   Prone row x30 1#  x   Prone HAB x20   x          SL abduction x30 1#  x   SL ER x30 1#  x   SL HAB NEXT      Supine ABC's 2x 1#  x          Wall flexion and abduction 20x ea 1.5# ball cw, ccw, side-side, up-down, protraction x          T-band:       Rows 3x10, 3\" Lime   HEP   Extension 2x10 Lime   HEP   Curls 2x10 Lime   HEP   Tricep ext 2x10 Lime   HEP          ER/IR walkouts 2x10 Lime  One step x          AROM       Flexion  2x10 1#  x   Abduction  2x10 1#  x   Other:        Access Code: [email or web address]  URL: [email or web address]  Date: 05/19/2022  Prepared by: Blessing Jeffers    Exercises  Standing Shoulder Flexion to 90 Degrees with Dumbbells - 1 x daily - 4 x weekly - 2 sets - 10 reps  Shoulder Abduction with Dumbbells - Thumbs Up - 1 x daily - 4 x weekly - 2 sets - 10 reps  Sidelying Shoulder Abduction Palm Forward - 1 x daily - 4 x weekly - 2 sets - 10 reps  Sidelying Shoulder External Rotation Dumbbell - 1 x daily - 4 x weekly - 2 sets - 10 reps  Supine Shoulder Alphabet - 1 x daily - 4 x weekly - 2 sets - 10 reps  Prone Shoulder Extension - Single Arm - 1 x daily - 4 x weekly - 2 sets - 10 reps  Prone Shoulder Row - 1 x daily - 4 x weekly - 2 sets - 10 reps  Prone Single Arm Shoulder Horizontal Abduction with Dumbbell - 1 x daily - 4 x weekly - 2 sets - 10 reps  Shoulder External Rotation Reactive Isometrics - 1 x daily - 4 x weekly - 2 sets - 10 reps  Shoulder Internal Rotation Reactive Isometrics - 1 x daily - 4 x weekly - 2 sets - 10 reps    Specific Instructions for next treatment: add in manual      Treatment Charges: Mins Units   []  Modalities     [x]  Ther Exercise 48 3KX   []  Manual Therapy     []  Ther Activities     []  Aquatics     [x]  Vasocompression 15 1KX   []  Other     Total Treatment time 63 4KX       Assessment: [x] Progressing toward goals. Able to progress pt strengthening program today. Pt has difficulty with mechanics in standing abduction d/t weakness and tends to compensate with UT, cueing required for corrections.  Weakness in side lying ER but able to complete full ROM with physical assistance and focused on eccentric aspect. Updated pt HEP with printout provided. [] No change. [x] Other: Since her last progress report, pt reports that she has seen progress in her pain and her flexibility/motion, as well as her fluidity in motion as she does not experience as much \"catching\" in the shoulder with motion. However, pt still reports that she has considerable weakness in her shoulder which is her biggest issue and concern at this time. Pt also reporting that her sleep can still be affected d/t pain at this time. Plan to continue with PT to keep working on her motion, but also to keep progressing functional strength as pt tolerates d/t still having some pain and \"catching\" with motion.- SB, PT  [x] Patient would continue to benefit from skilled physical therapy services in order to:Progress R shoulder strength, ROM, and functional use following R RTC repair      - Goals addressed by Yuli Bland, PT  STG: (to be met in 10 treatments)  ? Pain: Pt to decrease pain levels to 4/10 to help improve tolerance to progression through ROM.: MET (22)  Pt will self-report better tolerance to sleeping to help improve quality of sleep.:ONGOING, reports sleeping has improved, however can still have issues (22)  Independent with Home Exercise Programs: MET (22)     LTG: (to be met in 20 treatments)  Improve score of UEFS from 82% impaired to <50% impaired, demonstrating improved functional use of R UE.: ONGOING, pt scored 65% impaired (22)  ? ROM: Increase R UE PROM to full in all planes, allowing for transition to full AROM.: ONGOING (22)  Decrease pain to 0/10: ONGOING (22)     LT treatments  1. Pt will demonstrate R AROM equal to L to reduce need for compensations with functional use  2.  Pt will demonstrate R shoulder strength to 4+/5 grossly, reducing need for compensations with use of R UE.  3. Pt will demonstrate ability to complete all overhead motion with R UE without any compensations with UT, improve mechanics with completion. 4. Improve score of UEFS from 82% impaired to <30% impaired, demonstrating improved functional use of R UE. Patient goals: \"Regain movement without pain\"    Pt. Education:  [x] Yes  [] No  [x] Reviewed Prior HEP/Ed  Method of Education: [x] Verbal  [x] Demo  [x] Written  Comprehension of Education:  [x] Verbalizes understanding. [x] Demonstrates understanding. [x] Needs review. [] Demonstrates/verbalizes HEP/Ed previously given. Plan: [x] Continue current frequency toward long and short term goals.     [x] Specific Instructions for subsequent treatments: Continue tx per Protocol      Time In: 9:00 am          Time Out: 10:11am      Electronically signed by:  Roseanne English PTA

## 2022-05-19 NOTE — PRE-CERTIFICATION NOTE
Medicare Cap   [x] Physical Therapy  [] Speech Therapy  [] Occupational therapy  *PT and Speech caps combine      $2150 Limit for PT and Speech combined  $2150 Limit for OT individually  At the beginning of the month where you expect to go over $2150, please add the [address] [place] [address] modifier      Patient Name: Marilou Rodriguez: 1955    Note:  This is an estimate of charges billed. Date of [address] Name # units/ charge $$$ charge Daily Total Charge Ongoing Total $$$   3/14/22 Eval, therex 1/1 98.01+22.84+9.21 130.06 130.06   3/16/22 2TE,man,vaso 2,1,1 29.21+22.84+21.34+9.21 82.60 212.66   3/18/22 3TE, man  29.21+22.84+22.84+21.34 96.23 308.89   3/21/22 2TE, vaso 2,1 29.21+22.84+9.21 61.26 370.50   3/23/22 2TE,vaso 2,1  61.26 431.41   3/25/22 2TE, 1 vaso 2/1  61.26 492.67   3/28/22 TE, Vaso 2/1  61.26 553.93   3/30/22 TE, Vaso 3/1  84.10 638.03   4/1/22 TE, vaso 2/1  61.26 699.29   4/4/22 TE, vaso 2/1  61.26 760.55   4/6/22 TE, vaso 2/1  61.26 821.81   4/8/22 TE, vaso 3/1  84.10 905.91   4/11/22 TE, vaso 3/1  84.10 990.01   4/13/22 TE, vaso 3/1  84.10 1074.11   4/14/22 TE, vaso 3/1  84.10 1158.21   4/18/22 TE, vaso 3/1  84.10 1242.31   4/20/22 TE, vaso 3/1  84.10 1326.41   4/22/22 TE, vaso 3/1  84.10 1410.51   4/25/22 TE, vaso 3/1  84.10 1494.61   4/27/22 TE, vaso 4/1  106.94 1601.65   4/29/22 TE, vaso 4/1  106.94 1708.49   5/2/22 TE, vaso 4/1  106.94 1815.43   5/4/22 TE, vaso 4/1  106.94 1922.37   5/6/22 TE, vaso 3/1  84.10 2006.47   5/10/22 TE, vaso 3/1  84.10 2090.57   5/12/22 TE, vsao 3/1  84.10 2174.67-KX   5/17/22 TE, vaso 3/1  84.10 2258. 77- KX   5/18 TE, vaso 3/1  84.10 2342.87 KX

## 2022-05-22 NOTE — PROGRESS NOTES
Procedure: Right shoulder arthroscopic rotator cuff repair  Date of procedure: 2/22/22    HPI: J Carlos Bridges is a 77 y.o. female who is approximately 3 months status post the aforementioned procedure. She indicates that she continues to do well. She  has continued on with PT which is reportedly going well. Pain is rated at 5/10. Physical examination:  Ht 5' 5\" (1.651 m) Comment: per pt  Wt 163 lb 12.8 oz (74.3 kg)   BMI 27.26 kg/m²   Evaluation of patient's right shoulder and upper extremity demonstrates her incisions to be healed appropriately. There is minimal to no swelling at this time. Sensation is grossly intact light touch in all dermatomes and she has a 2+ radial pulse. ROM: (Degrees)    Right   A P   Left   A P    Elevation  150 150   Elevation  155   Abduction  155 155   Abduction  160    ER   70 70   ER   85   IR   T12    IR   T10   90 abd/ER      90 abd/ER     90 abd/IR      90 abd/IR     Crepitation  No    Crepitation  No      Muscle strength:    Right       Left    Deltoid   5    Deltoid   5  Supraspinatus  3    Supraspinatus  5  ER   4    ER   5  IR   5    IR   5    Special tests    Right   Rotator Cuff    Left    n   Painful arc    n   n   Pain with ER    n    n   Neer's     n    y   Hawkin's    n    n   Drop Arm    n  n   Lift off/Belly Press   n  n   ER Lag    n      Impression and plan:Marianna Fraser is a 77 y.o. female  who is approximately 3 months status post left shoulder arthroscopic rotator cuff repair. She is doing relatively well at this time but does have some weakness on exam as outlined above. I did have a discussion with the patient today about this and the possibilities. I explained to her that there is a chance that the rotator cuff is not healing and is why she exhibits some of this weakness or can certainly be the fact that she still needs to build up strength postoperatively.  She may continue to use the extremity for light activities of daily living not to exceed lifting, pushing or pulling more than 2-3 pounds. She is to continue to work in PT on ROM and gradual progressive RTC and scapular stabilizer strengthening. I will see her back in my clinic in 3 months for reevaluation but she was encouraged to return or call earlier with questions and/or concerns.

## 2022-05-26 ENCOUNTER — HOSPITAL ENCOUNTER (OUTPATIENT)
Dept: PHYSICAL THERAPY | Facility: CLINIC | Age: 67
Setting detail: THERAPIES SERIES
Discharge: HOME | End: 2022-05-26
Payer: MEDICARE

## 2022-05-26 PROCEDURE — 97110 THERAPEUTIC EXERCISES: CPT

## 2022-05-26 PROCEDURE — 97140 MANUAL THERAPY 1/> REGIONS: CPT

## 2022-05-26 PROCEDURE — 97016 VASOPNEUMATIC DEVICE THERAPY: CPT

## 2022-05-26 NOTE — FLOWSHEET NOTE
[] [facility]) [facility]  [facility]  [address]  [phone]  F: [phone] [] [address]  [facility] [place] [facility]   [address]  P: [phone]  F: [phone] [x] [address]  Outpatient Rehabilitation &  Therapy  [address]  P: [phone]  F: [phone] [] [address]  P: [phone]  F: [phone] [] [address]  [facility]   [address]   [place]: [phone]  F: [phone]      Physical Therapy Daily Treatment Note    Date:  2022  Patient Name:  Yeison Huynh    :  1955  MRN: [id number]   Physician: Los Bobo MD                   Insurance: MEDICARE ([employer]. [employer]  Medical Diagnosis: H43.626 - Status post right rotator cuff repair                Rehab Codes: F92.535, M25.611  Onset Date: DOS (22)                                       Next 's appt.: 22   Visit# / total visits:  (2-3x wk)                       Cancels/No Shows: 0/1    Subjective:  Pt presents to PT with no shoulder pain, however states that she has some increased pain in her R wrist upon arrival, rating at 4/10. Pain:  - Yes  X No  Location: R wrist     Pain Rating: (0-10 scale) 4/10  Pain altered Tx:  x No  - Yes  Action:  Comments:           Objective:  Modalities: vaso 15' low compression.    Precautions: No active Lifting, Pushing, Pulling                                   Exercises: Utilize medium RTC repair protocol  Exercise  R RTC Repair     S/P week 13 ()    Reps/ Time Weight/ Level Comments    UBE 6'  No resistance x   Pulleys 4'   Flexion, scaption    IR stretch 3x30\"     x          Rainbows on wall 10x  Facing wall and side of wall           Prone ext x30 1#     Prone row x30 1#     Prone HAB x20             SL abduction x30 1#  x   SL ER for subsequent treatments: Continue tx per Protocol      Time In: 0900        Time Out: 4580     Electronically signed by:   Rima Pinto PT

## 2022-05-26 NOTE — PRE-CERTIFICATION NOTE
Medicare Cap   [x] Physical Therapy  [] Speech Therapy  [] Occupational therapy  *PT and Speech caps combine      $2150 Limit for PT and Speech combined  $2150 Limit for OT individually  At the beginning of the month where you expect to go over $2150, please add the [address] [place] modifier      Patient Name: Casey Hansen: 1955    Note:  This is an estimate of charges billed. Date of [address] Name # units/ charge $$$ charge Daily Total Charge Ongoing Total $$$   3/14/22 Eval, therex 1/1 98.01+22.84+9.21 130.06 130.06   3/16/22 2TE,man,vaso 2,1,1 29.21+22.84+21.34+9.21 82.60 212.66   3/18/22 3TE, man  29.21+22.84+22.84+21.34 96.23 308.89   3/21/22 2TE, vaso 2,1 29.21+22.84+9.21 61.26 370.50   3/23/22 2TE,vaso 2,1  61.26 431.41   3/25/22 2TE, 1 vaso 2/1  61.26 492.67   3/28/22 TE, Vaso 2/1  61.26 553.93   3/30/22 TE, Vaso 3/1  84.10 638.03   4/1/22 TE, vaso 2/1  61.26 699.29   4/4/22 TE, vaso 2/1  61.26 760.55   4/6/22 TE, vaso 2/1  61.26 821.81   4/8/22 TE, vaso 3/1  84.10 905.91   4/11/22 TE, vaso 3/1  84.10 990.01   4/13/22 TE, vaso 3/1  84.10 1074.11   4/14/22 TE, vaso 3/1  84.10 1158.21   4/18/22 TE, vaso 3/1  84.10 1242.31   4/20/22 TE, vaso 3/1  84.10 1326.41   4/22/22 TE, vaso 3/1  84.10 1410.51   4/25/22 TE, vaso 3/1  84.10 1494.61   4/27/22 TE, vaso 4/1  106.94 1601.65   4/29/22 TE, vaso 4/1  106.94 1708.49   5/2/22 TE, vaso 4/1  106.94 1815.43   5/4/22 TE, vaso 4/1  106.94 1922.37   5/6/22 TE, vaso 3/1  84.10 2006.47   5/10/22 TE, vaso 3/1  84.10 2090.57   5/12/22 TE, vsao 3/1  84.10 2174.67-KX   5/17/22 TE, vaso 3/1  84.10 2258. 77- KX   5/18 TE, vaso 3/1  84.10 2342.87 KX   5/26 TE, Manual, Vaso 2/1/1 29.21+22.84+21.34+9.21 82.60 2425.47

## 2022-05-31 ENCOUNTER — HOSPITAL ENCOUNTER (OUTPATIENT)
Dept: PHYSICAL THERAPY | Facility: CLINIC | Age: 67
Setting detail: THERAPIES SERIES
Discharge: HOME | End: 2022-05-31
Payer: MEDICARE

## 2022-05-31 PROCEDURE — 97016 VASOPNEUMATIC DEVICE THERAPY: CPT

## 2022-05-31 PROCEDURE — 97110 THERAPEUTIC EXERCISES: CPT

## 2022-05-31 PROCEDURE — 97140 MANUAL THERAPY 1/> REGIONS: CPT

## 2022-05-31 NOTE — FLOWSHEET NOTE
[] [facility]) - [facility] &  Therapy  [address]  [phone]  F: [phone] [] [address]  [place] [address]   [address]  P: [phone]  F: [phone] [x] [address] &  Therapy  [address]  P: [phone]  F: [phone] [] [address]  P: [phone]  F: [phone] [] [address]  [facility]   [address]   [place]: [phone]  F: [phone]      Physical Therapy Daily Treatment Note    Date:  2022  Patient Name:  Dewayne Ochoa    :  1955  MRN: [id number]   Physician: Julianne Welch MD                   Insurance: MEDICARE ([employer]  Medical Diagnosis: O51.279 - Status post right rotator cuff repair                Rehab Codes: I52.895, M25.611  Onset Date: DOS (22)                                       Next 's appt.: 22   Visit# / total visits: 36 (2-3x wk)                       Cancels/No Shows: 0/1    Subjective:     Pain:  - Yes  X No  Location: R wrist     Pain Rating: (0-10 scale) 2/10  Pain altered Tx:  x No  - Yes  Action:  Comments:  Pt mentioned that her shoulder felt a little looser on Saturday but feels tight today. Objective:  Modalities: vaso 15' low compression.    Precautions: No active Lifting, Pushing, Pulling                                   Exercises: Utilize medium RTC repair protocol  Exercise  R RTC Repair     S/P week 13 ()    Reps/ Time Weight/ Level Comments    UBE 10'  No resistance xx   Pulleys 4'   Flexion, scaption    IR stretch 3x30\"     xx          Rainbows on wall 10x  Facing wall and side of wall           Prone ext x30 1#  xx   Prone row x30 1#  xx   Prone HAB x20   xx          SL abduction x30 1#  xx   SL ER x30 1# Assistance needed for range xx SL HAB 2x10    xx   Supine ABC's 2x 1# On TG level 20 xx          Supine punches 3x10 1# On TG level 20 xx          Wall flexion and abduction 20x ea 1.5# ball cw, ccw, side-side, up-down, protraction           T-band:       ER walkouts 2x10 Orange One step           AROM       Flexion  2x12 1#     Scaption 2x12 1#     Other: R infraspinatus and upper tricep R anterior shoulder mobs, manual to R lat, pec major/minor       Specific Instructions for next treatment:      Treatment Charges: Mins Units   []  Modalities     [x]  Ther Exercise 30 2KX   [x]  Manual Therapy 15 1KX   []  Ther Activities     []  Aquatics     [x]  Vasocompression 15 1KX   []  Other     Total Treatment time 60 4KX       Assessment: [x] Progressing toward goals. Initiated session with warm up and stretches. Followed up with manual to right shoulder and surrounding structures. Pt difficulties with range on side lying ER so added assistance to complete end range with concentration on eccentric movement. Finished with vaso. [] No change. [x] Other:    [x] Patient would continue to benefit from skilled physical therapy services in order to:Progress R shoulder strength, ROM, and functional use following R RTC repair      - Goals addressed by Andry Vickers, PT  STG: (to be met in 10 treatments)  1. ? Pain: Pt to decrease pain levels to 4/10 to help improve tolerance to progression through ROM.: MET (4/20/22)  2. Pt will self-report better tolerance to sleeping to help improve quality of sleep.:ONGOING, reports sleeping has improved, however can still have issues (5/19/22)  3. Independent with Home Exercise Programs: MET (4/20/22)     LTG: (to be met in 20 treatments)  1. Improve score of UEFS from 82% impaired to <50% impaired, demonstrating improved functional use of R UE.: ONGOING, pt scored 65% impaired (5/19/22)  2. ? ROM: Increase R UE PROM to full in all planes, allowing for transition to full AROM.: ONGOING (5/19/22)  3.  Decrease pain to 0/10: ONGOING (22)     LT treatments  1. Pt will demonstrate R AROM equal to L to reduce need for compensations with functional use  2. Pt will demonstrate R shoulder strength to 4+/5 grossly, reducing need for compensations with use of R UE.  3. Pt will demonstrate ability to complete all overhead motion with R UE without any compensations with UT, improve mechanics with completion. 4. Improve score of UEFS from 82% impaired to <30% impaired, demonstrating improved functional use of R UE. Patient goals: \"Regain movement without pain\"    Pt. Education:  [x] Yes  [] No  [x] Reviewed Prior HEP/Ed  Method of Education: [x] Verbal  [x] Demo  [] Written  Comprehension of Education:  [x] Verbalizes understanding. [x] Demonstrates understanding. [x] Needs review. [] Demonstrates/verbalizes HEP/Ed previously given. Plan: [x] Continue current frequency toward long and short term goals.     [x] Specific Instructions for subsequent treatments: Continue tx per Protocol      Time In: 0900        Time Out:  1001     Electronically signed by:  Alonso Escobedo PTA

## 2022-05-31 NOTE — PRE-CERTIFICATION NOTE
Medicare Cap   [x] Physical Therapy  [] Speech Therapy  [] Occupational therapy  *PT and Speech caps combine      $2150 Limit for PT and Speech combined  $2150 Limit for OT individually  At the beginning of the month where you expect to go over $2150, please add the [address] [place] modifier      Patient Name: Margaret Brand: 1955    Note:  This is an estimate of charges billed. Date of [address] Name # units/ charge $$$ charge Daily Total Charge Ongoing Total $$$   3/14/22 Eval, therex 1/1 98.01+22.84+9.21 130.06 130.06   3/16/22 2TE,man,vaso 2,1,1 29.21+22.84+21.34+9.21 82.60 212.66   3/18/22 3TE, man  29.21+22.84+22.84+21.34 96.23 308.89   3/21/22 2TE, vaso 2,1 29.21+22.84+9.21 61.26 370.50   3/23/22 2TE,vaso 2,1  61.26 431.41   3/25/22 2TE, 1 vaso 2/1  61.26 492.67   3/28/22 TE, Vaso 2/1  61.26 553.93   3/30/22 TE, Vaso 3/1  84.10 638.03   4/1/22 TE, vaso 2/1  61.26 699.29   4/4/22 TE, vaso 2/1  61.26 760.55   4/6/22 TE, vaso 2/1  61.26 821.81   4/8/22 TE, vaso 3/1  84.10 905.91   4/11/22 TE, vaso 3/1  84.10 990.01   4/13/22 TE, vaso 3/1  84.10 1074.11   4/14/22 TE, vaso 3/1  84.10 1158.21   4/18/22 TE, vaso 3/1  84.10 1242.31   4/20/22 TE, vaso 3/1  84.10 1326.41   4/22/22 TE, vaso 3/1  84.10 1410.51   4/25/22 TE, vaso 3/1  84.10 1494.61   4/27/22 TE, vaso 4/1  106.94 1601.65   4/29/22 TE, vaso 4/1  106.94 1708.49   5/2/22 TE, vaso 4/1  106.94 1815.43   5/4/22 TE, vaso 4/1  106.94 1922.37   5/6/22 TE, vaso 3/1  84.10 2006.47   5/10/22 TE, vaso 3/1  84.10 2090.57   5/12/22 TE, vsao 3/1  84.10 2174.67-KX   5/17/22 TE, vaso 3/1  84.10 2258. 77- KX   5/18 TE, vaso 3/1  84.10 2342.87 KX   5/26 TE, Manual, Vaso 2/1/1 29.21+22.84+21.34+9.21 82.60 2425.47   5/31 TE, man, vaso   82.60 2508.07

## 2022-06-02 ENCOUNTER — HOSPITAL ENCOUNTER (OUTPATIENT)
Dept: PHYSICAL THERAPY | Facility: CLINIC | Age: 67
Setting detail: THERAPIES SERIES
Discharge: HOME | End: 2022-06-02
Payer: MEDICARE

## 2022-06-02 PROCEDURE — 97140 MANUAL THERAPY 1/> REGIONS: CPT

## 2022-06-02 PROCEDURE — 97016 VASOPNEUMATIC DEVICE THERAPY: CPT

## 2022-06-02 PROCEDURE — 97110 THERAPEUTIC EXERCISES: CPT

## 2022-06-02 NOTE — FLOWSHEET NOTE
[] [facility]) - [facility] &  Therapy  [address]  [phone]  F: [phone] [] [address]  [place]   [address]  P: [phone]  F: [phone] [x] [address] &  Therapy  [address]  P: [phone]  F: [phone] [] [address]  P: [phone]  F: [phone] [] [address]  [facility]   [address]   [place]: [phone]  F: [phone]      Physical Therapy Daily Treatment Note    Date:  2022  Patient Name:  David Garber    :  1955  MRN: [id number]   Physician: Annamarie Lopez MD                   Insurance: MEDICARE ([employer]  Medical Diagnosis: X15.696 - Status post right rotator cuff repair                Rehab Codes: Z98.382, M25.611  Onset Date: DOS (22)                                       Next 's appt.: 22   Visit# / total visits: 36 (2-3x wk)                       Cancels/No Shows: 0/1    Subjective:     Pain:  x Yes   No  Location: R deltoid     Pain Rating: (0-10 scale) 1/10  Pain altered Tx:  x No  - Yes  Action:  Comments:  Pt mentioned that she was feeling better today than previous session. Objective:  Modalities: vaso 15' low compression.    Precautions: No active Lifting, Pushing, Pulling                                   Exercises: Utilize medium RTC repair protocol  Exercise  R RTC Repair     S/P week 13 ()    Reps/ Time Weight/ Level Comments    [place] 10'  No resistance x   Pulleys 4'   Flexion, scaption    IR stretch 3x30\"     x          Rainbows on wall 10x  Facing wall and side of wall           Prone ext x30 2#  x   Prone row x30 2#  x   Prone HAB x20   x          SL abduction x30 1#  x   SL ER x30 1# Assistance needed for range x   SL HAB 2x10 x          Supine ABC's 2x 2# On TG level 20 x   Supine punches 3x10 2# On TG level 20 x          Wall flexion and abduction 20x ea 1.5# ball cw, ccw, side-side, up-down, protraction           T-band:       ER walkouts 3x10 Orange One step x          AROM       Flexion  2x12 1#     Scaption 2x12 1#     Other: R infraspinatus and upper tricep R anterior shoulder mobs, manual to R lat, pec major/minor       Specific Instructions for next treatment:      Treatment Charges: Mins Units   []  Modalities     [x]  Ther Exercise 30 2KX   [x]  Manual Therapy 15 1KX   []  Ther Activities     []  Aquatics     [x]  Vasocompression 15 1KX   []  Other     Total Treatment time 60 4KX       Assessment: [x] Progressing toward goals. Began with prone strengthening with progression in weight for extension and rows. Able to progress weight with supine exercises. Pt continued to require mod assistance to complete full ROM with side lying ER and no assistance with eccentric movement. Mirror utilized for visual cueing with ER walk outs. [] No change. [] Other:    [x] Patient would continue to benefit from skilled physical therapy services in order to:Progress R shoulder strength, ROM, and functional use following R RTC repair      - Goals addressed by Katrin Fields PT  STG: (to be met in 10 treatments)  1. ? Pain: Pt to decrease pain levels to 4/10 to help improve tolerance to progression through ROM.: MET (4/20/22)  2. Pt will self-report better tolerance to sleeping to help improve quality of sleep.:ONGOING, reports sleeping has improved, however can still have issues (5/19/22)  3. Independent with Home Exercise Programs: MET (4/20/22)     LTG: (to be met in 20 treatments)  1.  Improve score of UEFS from 82% impaired to <50% impaired, demonstrating improved functional use of R UE.: ONGOING, pt scored 65% impaired (5/19/22)  2. ? ROM: Increase R UE PROM to full in all planes, allowing for transition to full AROM.: ONGOING (22)  3. Decrease pain to 0/10: ONGOING (22)     LT treatments  1. Pt will demonstrate R AROM equal to L to reduce need for compensations with functional use  2. Pt will demonstrate R shoulder strength to 4+/5 grossly, reducing need for compensations with use of R UE.  3. Pt will demonstrate ability to complete all overhead motion with R UE without any compensations with UT, improve mechanics with completion. 4. Improve score of UEFS from 82% impaired to <30% impaired, demonstrating improved functional use of R UE. Patient goals: \"Regain movement without pain\"    Pt. Education:  [x] Yes  [] No  [x] Reviewed Prior HEP/Ed  Method of Education: [x] Verbal  [x] Demo  [] Written  Comprehension of Education:  [x] Verbalizes understanding. [x] Demonstrates understanding. [x] Needs review. [] Demonstrates/verbalizes HEP/Ed previously given. Plan: [x] Continue current frequency toward long and short term goals.     [x] Specific Instructions for subsequent treatments: Continue tx per Protocol      Time In: 7383        Time Out:  1000     Electronically signed by:  Arleen Villar PTA

## 2022-06-02 NOTE — PRE-CERTIFICATION NOTE
Medicare Cap   [x] Physical Therapy  [] Speech Therapy  [] Occupational therapy  *PT and Speech caps combine      $2150 Limit for PT and Speech combined  $2150 Limit for OT individually  At the beginning of the month where you expect to go over $2150, please add the [address] [place] 22 modifier      Patient Name: Radha Jameson: 1955    Note:  This is an estimate of charges billed. Date of [address] Name # units/ charge $$$ charge Daily Total Charge Ongoing Total $$$   3/14/22 Eval, therex 1/1 98.01+22.84+9.21 130.06 130.06   3/16/22 2TE,man,vaso 2,1,1 29.21+22.84+21.34+9.21 82.60 212.66   3/18/22 3TE, man  29.21+22.84+22.84+21.34 96.23 308.89   3/21/22 2TE, vaso 2,1 29.21+22.84+9.21 61.26 370.50   3/23/22 2TE,vaso 2,1  61.26 431.41   3/25/22 2TE, 1 vaso 2/1  61.26 492.67   3/28/22 TE, Vaso 2/1  61.26 553.93   3/30/22 TE, Vaso 3/1  84.10 638.03   4/1/22 TE, vaso 2/1  61.26 699.29   4/4/22 TE, vaso 2/1  61.26 760.55   4/6/22 TE, vaso 2/1  61.26 821.81   4/8/22 TE, vaso 3/1  84.10 905.91   4/11/22 TE, vaso 3/1  84.10 990.01   4/13/22 TE, vaso 3/1  84.10 1074.11   4/14/22 TE, vaso 3/1  84.10 1158.21   4/18/22 TE, vaso 3/1  84.10 1242.31   4/20/22 TE, vaso 3/1  84.10 1326.41   4/22/22 TE, vaso 3/1  84.10 1410.51   4/25/22 TE, vaso 3/1  84.10 1494.61   4/27/22 TE, vaso 4/1  106.94 1601.65   4/29/22 TE, vaso 4/1  106.94 1708.49   5/2/22 TE, vaso 4/1  106.94 1815.43   5/4/22 TE, vaso 4/1  106.94 1922.37   5/6/22 TE, vaso 3/1  84.10 2006.47   5/10/22 TE, vaso 3/1  84.10 2090.57   5/12/22 TE, vsao 3/1  84.10 2174.67-KX   5/17/22 TE, vaso 3/1  84.10 2258. 77- KX   5/18 TE, vaso 3/1  84.10 2342.87 KX   5/26 TE, Manual, Vaso 2/1/1 29.21+22.84+21.34+9.21 82.60 2425.47   5/31 TE, man, vaso   82.60 2508.07   6/2 TE2, man, vaso   82.60 2590.67

## 2022-06-06 ENCOUNTER — HOSPITAL ENCOUNTER (OUTPATIENT)
Dept: GENERAL RADIOLOGY | Age: 67
Discharge: HOME | End: 2022-06-08
Payer: MEDICARE

## 2022-06-06 ENCOUNTER — HOSPITAL ENCOUNTER (OUTPATIENT)
Age: 67
Discharge: HOME | End: 2022-06-08
Payer: MEDICARE

## 2022-06-06 ENCOUNTER — OFFICE VISIT (OUTPATIENT)
Dept: FAMILY MEDICINE CLINIC | Age: 67
End: 2022-06-06
Payer: MEDICARE

## 2022-06-06 VITALS
DIASTOLIC BLOOD PRESSURE: 78 MMHG | OXYGEN SATURATION: 99 % | HEART RATE: 88 BPM | SYSTOLIC BLOOD PRESSURE: 113 MMHG | HEIGHT: 65 IN | WEIGHT: 162 LBS | BODY MASS INDEX: 26.99 KG/M2

## 2022-06-06 DIAGNOSIS — M79.644 FINGER PAIN, RIGHT: ICD-10-CM

## 2022-06-06 DIAGNOSIS — E55.9 VITAMIN D DEFICIENCY: ICD-10-CM

## 2022-06-06 DIAGNOSIS — I10 HYPERTENSION, UNSPECIFIED TYPE: Primary | ICD-10-CM

## 2022-06-06 DIAGNOSIS — E11.9 TYPE 2 DIABETES MELLITUS WITHOUT COMPLICATION, WITHOUT LONG-TERM CURRENT USE OF INSULIN (HCC): ICD-10-CM

## 2022-06-06 DIAGNOSIS — E78.00 HYPERCHOLESTEROLEMIA: ICD-10-CM

## 2022-06-06 DIAGNOSIS — Z79.899 ON STATIN THERAPY: ICD-10-CM

## 2022-06-06 DIAGNOSIS — Z13.6 SCREENING FOR CARDIOVASCULAR CONDITION: ICD-10-CM

## 2022-06-06 DIAGNOSIS — M17.11 OSTEOARTHRITIS OF RIGHT KNEE, UNSPECIFIED OSTEOARTHRITIS TYPE: ICD-10-CM

## 2022-06-06 DIAGNOSIS — K21.9 GASTROESOPHAGEAL REFLUX DISEASE WITHOUT ESOPHAGITIS: ICD-10-CM

## 2022-06-06 PROCEDURE — G8399 PT W/DXA RESULTS DOCUMENT: HCPCS | Performed by: INTERNAL MEDICINE

## 2022-06-06 PROCEDURE — 1036F TOBACCO NON-USER: CPT | Performed by: INTERNAL MEDICINE

## 2022-06-06 PROCEDURE — 99214 OFFICE O/P EST MOD 30 MIN: CPT | Performed by: INTERNAL MEDICINE

## 2022-06-06 PROCEDURE — 3017F COLORECTAL CA SCREEN DOC REV: CPT | Performed by: INTERNAL MEDICINE

## 2022-06-06 PROCEDURE — 1123F ACP DISCUSS/DSCN MKR DOCD: CPT | Performed by: INTERNAL MEDICINE

## 2022-06-06 PROCEDURE — 73130 X-RAY EXAM OF HAND: CPT

## 2022-06-06 PROCEDURE — 1090F PRES/ABSN URINE INCON ASSESS: CPT | Performed by: INTERNAL MEDICINE

## 2022-06-06 PROCEDURE — G8417 CALC BMI ABV UP PARAM F/U: HCPCS | Performed by: INTERNAL MEDICINE

## 2022-06-06 PROCEDURE — 2022F DILAT RTA XM EVC RTNOPTHY: CPT | Performed by: INTERNAL MEDICINE

## 2022-06-06 PROCEDURE — 3046F HEMOGLOBIN A1C LEVEL >9.0%: CPT | Performed by: INTERNAL MEDICINE

## 2022-06-06 PROCEDURE — G8427 DOCREV CUR MEDS BY ELIG CLIN: HCPCS | Performed by: INTERNAL MEDICINE

## 2022-06-06 RX ORDER — MELOXICAM 15 MG/1
15 TABLET ORAL DAILY
Qty: 90 TABLET | Refills: 3 | Status: SHIPPED | OUTPATIENT
Start: 2022-06-06 | End: 2022-10-06

## 2022-06-06 RX ORDER — ONDANSETRON 4 MG/1
TABLET, FILM COATED ORAL
Qty: 20 TABLET | OUTPATIENT
Start: 2022-06-06

## 2022-06-06 RX ORDER — MONTELUKAST SODIUM 10 MG/1
10 TABLET ORAL NIGHTLY
Qty: 90 TABLET | Refills: 3 | Status: SHIPPED | OUTPATIENT
Start: 2022-06-06 | End: 2022-08-05 | Stop reason: SDUPTHER

## 2022-06-06 RX ORDER — GABAPENTIN 600 MG/1
1200 TABLET ORAL NIGHTLY
Qty: 180 TABLET | Refills: 3 | Status: SHIPPED | OUTPATIENT
Start: 2022-06-06 | End: 2022-10-06 | Stop reason: SDUPTHER

## 2022-06-06 RX ORDER — MIRTAZAPINE 30 MG/1
30 TABLET, FILM COATED ORAL NIGHTLY
Qty: 90 TABLET | Refills: 3 | Status: SHIPPED | OUTPATIENT
Start: 2022-06-06 | End: 2022-10-06

## 2022-06-06 RX ORDER — ESCITALOPRAM OXALATE 5 MG/1
5 TABLET ORAL NIGHTLY
Qty: 90 TABLET | Refills: 3 | Status: SHIPPED | OUTPATIENT
Start: 2022-06-06 | End: 2022-10-06

## 2022-06-06 RX ORDER — OMEPRAZOLE 40 MG/1
40 CAPSULE, DELAYED RELEASE ORAL
Qty: 90 CAPSULE | Refills: 3 | Status: SHIPPED | OUTPATIENT
Start: 2022-06-06 | End: 2022-10-06 | Stop reason: SDUPTHER

## 2022-06-06 RX ORDER — LOSARTAN POTASSIUM 25 MG/1
25 TABLET ORAL DAILY
Qty: 90 TABLET | Refills: 3 | Status: SHIPPED | OUTPATIENT
Start: 2022-06-06 | End: 2022-10-06 | Stop reason: SDUPTHER

## 2022-06-06 RX ORDER — ROSUVASTATIN CALCIUM 10 MG/1
TABLET, COATED ORAL
Qty: 90 TABLET | Refills: 3 | Status: SHIPPED | OUTPATIENT
Start: 2022-06-06 | End: 2022-10-06 | Stop reason: SDUPTHER

## 2022-06-06 SDOH — ECONOMIC STABILITY: FOOD INSECURITY: WITHIN THE PAST 12 MONTHS, THE FOOD YOU BOUGHT JUST DIDN'T LAST AND YOU DIDN'T HAVE MONEY TO GET MORE.: NEVER TRUE

## 2022-06-06 SDOH — ECONOMIC STABILITY: FOOD INSECURITY: WITHIN THE PAST 12 MONTHS, YOU WORRIED THAT YOUR FOOD WOULD RUN OUT BEFORE YOU GOT MONEY TO BUY MORE.: NEVER TRUE

## 2022-06-06 ASSESSMENT — SOCIAL DETERMINANTS OF HEALTH (SDOH): HOW HARD IS IT FOR YOU TO PAY FOR THE VERY BASICS LIKE FOOD, HOUSING, MEDICAL CARE, AND HEATING?: NOT HARD AT ALL

## 2022-06-06 ASSESSMENT — PATIENT HEALTH QUESTIONNAIRE - PHQ9
SUM OF ALL RESPONSES TO PHQ QUESTIONS 1-9: 0
SUM OF ALL RESPONSES TO PHQ9 QUESTIONS 1 & 2: 0
SUM OF ALL RESPONSES TO PHQ QUESTIONS 1-9: 0
SUM OF ALL RESPONSES TO PHQ QUESTIONS 1-9: 0
1. LITTLE INTEREST OR PLEASURE IN DOING THINGS: 0
2. FEELING DOWN, DEPRESSED OR HOPELESS: 0
SUM OF ALL RESPONSES TO PHQ QUESTIONS 1-9: 0

## 2022-06-06 ASSESSMENT — ENCOUNTER SYMPTOMS
RESPIRATORY NEGATIVE: 1
GASTROINTESTINAL NEGATIVE: 1
EYES NEGATIVE: 1
ALLERGIC/IMMUNOLOGIC NEGATIVE: 1

## 2022-06-06 NOTE — PROGRESS NOTES
[address]   [facility]  [address]  [facility] [address]      Date of Visit:  2022  Patient Name: Bambi Jorgensen   Patient :  1955     CHIEF COMPLAINT:     Bambi Jorgensen is a 77 y.o. female who presents today for an general visit to be evaluated for the following condition(s):  Chief Complaint   Patient presents with    Diabetes    Gastroesophageal Reflux       REVIEW OF SYSTEM      Review of Systems   Constitutional: Negative. HENT: Negative. Eyes: Negative. Respiratory: Negative. Cardiovascular: Negative. Gastrointestinal: Negative. Endocrine: Negative. Genitourinary: Negative. Musculoskeletal:        Right index finger pain   Skin: Negative. Allergic/Immunologic: Negative. Neurological: Negative. Hematological: Negative. Psychiatric/Behavioral: Negative. All other systems reviewed and are negative. HISTORY OF PRESENT ILLNESS     HPI    Pt is here for 6 month check follow up appt and medication review  She has not had her labs drawn yet. She is working with PT on post shoulder surgery. She has noticed pain and possible trigger finger in her right ring finger.     REVIEWED INFORMATION      Allergies   Allergen Reactions    Latex     Seasonal     Penicillins Rash       Patient Active Problem List   Diagnosis    Type 2 diabetes mellitus without complication ([address]    On statin therapy    Hypercholesterolemia    Vitamin D deficiency    Gastroesophageal reflux disease without esophagitis    Hypertension    Rotator cuff tendonitis, right    Osteoarthritis of right knee    Right shoulder pain    Right knee pain    Atopic dermatitis    Subluxation of tendon of long head of biceps    Complete tear of right rotator cuff       Past Medical History:   Diagnosis Date    Acid reflux     Asthma     seasonal    Depression     Diabetes mellitus ([address]     type 2    Diabetic neuropathy (HCC)     Hyperlipidemia     Neuropathy     PONV (postoperative nausea and vomiting)        Past Surgical History:   Procedure Laterality Date    APPENDECTOMY  07/07/2016    laparoscopic    BREAST BIOPSY Left     BREAST SURGERY      b/l reduction    COLONOSCOPY      HYSTERECTOMY      SHOULDER ARTHROSCOPY Right 02/22/2022    RIGHT SHOULDER ARTHROSCOPY ROTATOR CUFF REPAIR AND BICEPS TENODESIS WITH ARTHREX AND PRE OP INTERSCALENE BLOCK WITH EXPERAL (Right )    SHOULDER ARTHROSCOPY Right 2/22/2022    RIGHT SHOULDER ARTHROSCOPY ROTATOR CUFF REPAIR AND BICEPS TENODESIS WITH ARTHREX AND PRE OP INTERSCALENE BLOCK WITH EXPERAL performed by Ketty Wan MD at [facility] History     Socioeconomic History    Marital status:      Spouse name: Not on file    Number of children: Not on file    Years of education: Not on file    Highest education level: Not on file   Occupational History    Not on file   Tobacco Use    Smoking status: Never Smoker    Smokeless tobacco: Never Used   Vaping Use    Vaping Use: Never used   Substance and Sexual Activity    Alcohol use: No    Drug use: Never    Sexual activity: Not on file   Other Topics Concern    Not on file   Social History Narrative    Not on file     Social Determinants of Health     Financial Resource Strain: Low Risk     Difficulty of Paying Living Expenses: Not hard at all   Food Insecurity: No Food Insecurity    Worried About Running Out of Food in the Last Year: Never true    Len of Food in the Last Year: Never true   Transportation Needs:     Lack of Transportation (Medical): Not on file    Lack of Transportation (Non-Medical):  Not on file   Physical Activity:     Days of Exercise per Week: Not on file    Minutes of Exercise per Session: Not on file   Stress:     Feeling of Stress : Not on file   Social Connections:     Frequency of Communication with Friends and Family: Not on file    Frequency of Social Gatherings with Friends and Family: Not on file    Attends Worship Services: Not on file    Active Member of Clubs or Organizations: Not on file    Attends Club or Organization Meetings: Not on file    Marital Status: Not on file   Intimate Partner Violence:     Fear of Current or Ex-Partner: Not on file    Emotionally Abused: Not on file    Physically Abused: Not on file    Sexually Abused: Not on file   Housing Stability:     Unable to Pay for Housing in the Last Year: Not on file    Number of [place] in the Last Year: Not on file    Unstable Housing in the Last Year: Not on file        Family History   Problem Relation Age of Onset    Asthma Mother     High Blood Pressure Mother     Other Mother     Depression Mother     Heart Disease Father     High Cholesterol Father     High Blood Pressure Father     High Cholesterol Brother        PHYSICAL EXAM     /78   Pulse 88   Ht 5' 5\" (1.651 m)   Wt 162 lb (73.5 kg)   SpO2 99%   BMI 26.96 kg/m²    Physical Exam  Vitals and nursing note reviewed. Constitutional:       Appearance: Normal appearance. She is normal weight. HENT:      Head: Normocephalic and atraumatic. Right Ear: Tympanic membrane, ear canal and external ear normal.      Left Ear: Tympanic membrane, ear canal and external ear normal.      Nose: Nose normal.      Mouth/Throat:      Mouth: Mucous membranes are moist.   Eyes:      Extraocular Movements: Extraocular movements intact. Conjunctiva/sclera: Conjunctivae normal.      Pupils: Pupils are equal, round, and reactive to light. Cardiovascular:      Rate and Rhythm: Normal rate and regular rhythm. Pulses: Normal pulses. Heart sounds: Normal heart sounds. Pulmonary:      Effort: Pulmonary effort is normal.      Breath sounds: Normal breath sounds. Abdominal:      General: Abdomen is flat. Bowel sounds are normal.      Palpations: Abdomen is soft.    Musculoskeletal: General: Normal range of motion. Cervical back: Normal range of motion and neck supple. Skin:     General: Skin is warm. Capillary Refill: Capillary refill takes less than 2 seconds. Neurological:      General: No focal deficit present. Mental Status: She is alert and oriented to person, place, and time. Psychiatric:         Mood and Affect: Mood normal.         Behavior: Behavior normal.         Thought Content: Thought content normal.         Judgment: Judgment normal.         ASSESSMENT/PLAN     1. Hypertension, unspecified type    2. Gastroesophageal reflux disease without esophagitis  - CBC with Auto Differential; Future    3. Type 2 diabetes mellitus without complication, without long-term current use of insulin (HCC)  - Comprehensive Metabolic Panel, Fasting; Future  - Hemoglobin A1C; Future  - Lipid, Fasting; Future  - TSH With Reflex Ft4; Future  - Microalbumin / Creatinine Urine Ratio; Future    4. Vitamin D deficiency  - Vitamin D 25 Hydroxy; Future    5. Hypercholesterolemia  - Lipid, Fasting; Future    6. On statin therapy  - Lipid, Fasting; Future    7. Finger pain, right  - XR HAND RIGHT (MIN 3 VIEWS); Future    8. Osteoarthritis of right knee, unspecified osteoarthritis type  - meloxicam (MOBIC) 15 MG tablet; Take 1 tablet by mouth daily  Dispense: 90 tablet; Refill: 3    9. Screening for cardiovascular condition  - C-Reactive Protein;  Future    Records reviewed    Healthcare Maintenance -[address] [place] [address] Annual wellness completed 11/29/2021, mammogram 4/2021, colonoscopy up to date, immunizations are up to date, screening labs are up to date, cont working to optimize diet and exercise.       DM - cont current regimen, working with endo, A1C as been in good range, recheck scheduled     Cholesterol - cont med, cont working to optimize diet and healthy activity, recheck scheduled     Vit d Def  - cont diet rich in Vit D, recheck scheduled     Reflux - cont med, avoid triggers     Anxiety/depression - cont med, sx well controlled currently     Allergies/asthma - has been stable, used meds for flare ups    Finger pain - check xrays       COMMUNICATION:       Electronically signed by Keith Hu MD on 6/6/2022 at 8:35 AM

## 2022-06-07 ENCOUNTER — HOSPITAL ENCOUNTER (OUTPATIENT)
Dept: PHYSICAL THERAPY | Facility: CLINIC | Age: 67
Setting detail: THERAPIES SERIES
Discharge: HOME | End: 2022-06-07
Payer: MEDICARE

## 2022-06-07 PROCEDURE — 97016 VASOPNEUMATIC DEVICE THERAPY: CPT

## 2022-06-07 PROCEDURE — 97110 THERAPEUTIC EXERCISES: CPT

## 2022-06-07 NOTE — FLOWSHEET NOTE
[] [facility]) - [facility] &  Therapy  [address]  [phone]  F: [phone] [] [address]  [address]  P: [phone]  F: [phone] [x] [address] &  Therapy  [address] [facility] [address]  P: [phone]  F: [phone] [] [address]  P: [phone]  F: [phone] [] [address]  [facility]   [address]   [place]: [phone]  F: [phone]      Physical Therapy Daily Treatment Note    Date:  2022  Patient Name:  J Carlos Bridges    :  1955  MRN: [id number]   Physician: Melissa Cárdenas MD                   Insurance: MEDICARE ([employer]  Medical Diagnosis: B34.582 - Status post right rotator cuff repair                Rehab Codes: S91.072, M25.611  Onset Date: DOS (22)                                       Next 's appt.: 22   Visit# / total visits: 32/36 (2-3x wk)                       Cancels/No Shows: 0/1    Subjective:     Pain:  x Yes   No  Location: R deltoid     Pain Rating: (0-10 scale) 1/10  Pain altered Tx:  x No  - Yes  Action:  Comments:  Pt stated that she was doing \"ok\" today. Objective:  Modalities: vaso 15' low compression.    Precautions: No active Lifting, Pushing, Pulling                                   Exercises: Utilize medium RTC repair protocol  Exercise  R RTC Repair     S/P week 13 (Tu)    Reps/ Time Weight/ Level Comments    Codie 10'  No resistance x   IR stretch 3x30\"     x          Prone ext x30 2#  x   Prone row x30 2#  x   Prone HAB x20   x          SL abduction x30 1#  x   SL ER eccentric x15 3# Assistance needed for concentric  x   SL ER  x10 1# Assistance needed for end range x   SL HAB 20x 1#  x          Supine ABC's 2x 2#   x   Supine punches 3x10 2#  x          Overhead shoulder press  1# Arm held at a 45° x          T-band:       ER walkouts 3x10 [place] One step           AROM       Flexion  2x12 1#  x   Scaption 2x12 1#  x   Other:         Specific Instructions for next treatment:      Treatment Charges: Mins Units   []  Modalities     [x]  Ther Exercise 45 3KX   []  Manual Therapy      []  Ther Activities     []  Aquatics     [x]  Vasocompression 15 1KX   []  Other     Total Treatment time 60 4KX       Assessment: [x] Progressing toward goals. Increased weight on ER today to complete only eccentric movements with mod/max verbal and tactile cueing to ensure proper deceleration rate and performance. Added in standing overhead press with moderate tactile cueing to keep pt inline with technique and performance. Able to perform supine exercises on flat surface compared to TG.    [] No change. [] Other:    [x] Patient would continue to benefit from skilled physical therapy services in order to:Progress R shoulder strength, ROM, and functional use following R RTC repair      - Goals addressed by Rica Wilks, PT  STG: (to be met in 10 treatments)  1. ? Pain: Pt to decrease pain levels to 4/10 to help improve tolerance to progression through ROM.: MET (22)  2. Pt will self-report better tolerance to sleeping to help improve quality of sleep.:ONGOING, reports sleeping has improved, however can still have issues (22)  3. Independent with Home Exercise Programs: MET (22)     LTG: (to be met in 20 treatments)  1. Improve score of UEFS from 82% impaired to <50% impaired, demonstrating improved functional use of R UE.: ONGOING, pt scored 65% impaired (22)  2. ? ROM: Increase R UE PROM to full in all planes, allowing for transition to full AROM.: ONGOING (22)  3. Decrease pain to 0/10: ONGOING (22)     LT treatments  1. Pt will demonstrate R AROM equal to L to reduce need for compensations with functional use  2.  Pt will demonstrate R shoulder strength to 4+/5 grossly, reducing need for compensations with use of R UE.  3. Pt will demonstrate ability to complete all overhead motion with R UE without any compensations with UT, improve mechanics with completion. 4. Improve score of UEFS from 82% impaired to <30% impaired, demonstrating improved functional use of R UE. Patient goals: \"Regain movement without pain\"    Pt. Education:  [x] Yes  [] No  [x] Reviewed Prior HEP/Ed  Method of Education: [x] Verbal  [x] Demo  [] Written  Comprehension of Education:  [x] Verbalizes understanding. [x] Demonstrates understanding. [x] Needs review. [] Demonstrates/verbalizes HEP/Ed previously given. Plan: [x] Continue current frequency toward long and short term goals.     [x] Specific Instructions for subsequent treatments: Continue tx per Protocol      Time In: 7413        Time Out:  1003     Electronically signed by:  Ike Moore PTA

## 2022-06-07 NOTE — PRE-CERTIFICATION NOTE
Medicare Cap   [x] Physical Therapy  [] Speech Therapy  [] Occupational therapy  *PT and Speech caps combine      $2150 Limit for PT and Speech combined  $2150 Limit for OT individually  At the beginning of the month where you expect to go over $2150, please add the [address] [place] [address] modifier      Patient Name: Radha Jameson: 1955    Note:  This is an estimate of charges billed. Date of [address] Name # units/ charge $$$ charge Daily Total Charge Ongoing Total $$$   3/14/22 Eval, therex 1/1 98.01+22.84+9.21 130.06 130.06   3/16/22 2TE,man,vaso 2,1,1 29.21+22.84+21.34+9.21 82.60 212.66   3/18/22 3TE, man  29.21+22.84+22.84+21.34 96.23 308.89   3/21/22 2TE, vaso 2,1 29.21+22.84+9.21 61.26 370.50   3/23/22 2TE,vaso 2,1  61.26 431.41   3/25/22 2TE, 1 vaso 2/1  61.26 492.67   3/28/22 TE, Vaso 2/1  61.26 553.93   3/30/22 TE, Vaso 3/1  84.10 638.03   4/1/22 TE, vaso 2/1  61.26 699.29   4/4/22 TE, vaso 2/1  61.26 760.55   4/6/22 TE, vaso 2/1  61.26 821.81   4/8/22 TE, vaso 3/1  84.10 905.91   4/11/22 TE, vaso 3/1  84.10 990.01   4/13/22 TE, vaso 3/1  84.10 1074.11   4/14/22 TE, vaso 3/1  84.10 1158.21   4/18/22 TE, vaso 3/1  84.10 1242.31   4/20/22 TE, vaso 3/1  84.10 1326.41   4/22/22 TE, vaso 3/1  84.10 1410.51   4/25/22 TE, vaso 3/1  84.10 1494.61   4/27/22 TE, vaso 4/1  106.94 1601.65   4/29/22 TE, vaso 4/1  106.94 1708.49   5/2/22 TE, vaso 4/1  106.94 1815.43   5/4/22 TE, vaso 4/1  106.94 1922.37   5/6/22 TE, vaso 3/1  84.10 2006.47   5/10/22 TE, vaso 3/1  84.10 2090.57   5/12/22 TE, vsao 3/1  84.10 2174.67-KX   5/17/22 TE, vaso 3/1  84.10 2258. 77- KX   5/18 TE, vaso 3/1  84.10 2342.87 KX   5/26 TE, Manual, Vaso 2/1/1 29.21+22.84+21.34+9.21 82.60 2425. 47KX   5/31 TE, man, vaso   82.60 2508. 07KX   6/2 TE2, man, vaso   82.60 2590.67KX   6/7 TE3, vaso  29.21+22.84+22.84+9.21 84.10 2674. Jamee Vera

## 2022-06-09 ENCOUNTER — HOSPITAL ENCOUNTER (OUTPATIENT)
Dept: PHYSICAL THERAPY | Facility: CLINIC | Age: 67
Setting detail: THERAPIES SERIES
Discharge: HOME | End: 2022-06-09
Payer: MEDICARE

## 2022-06-09 PROCEDURE — 97016 VASOPNEUMATIC DEVICE THERAPY: CPT

## 2022-06-09 PROCEDURE — 97110 THERAPEUTIC EXERCISES: CPT

## 2022-06-09 NOTE — FLOWSHEET NOTE
[] [facility]) [facility] &  Therapy  [address]  [phone]  F: [phone] [] [email or web address]  [address]   [address]  P: [phone]  F: [phone] [x] [address]  Therapy  [address] [facility] [address]  P: [phone]  F: [phone] [] [address]  P: [phone]  F: [phone] [] [address]  Psychiatric   [address]   [place]: [phone]  F: [phone]      Physical Therapy Daily Treatment Note    Date:  2022  Patient Name:  Negrito Boyle    :  1955  MRN: [id number]   Physician: Saumya Davis MD                   Insurance: MEDICARE ([employer]  Medical Diagnosis: L19.753 - Status post right rotator cuff repair                Rehab Codes: H69.114, M25.611  Onset Date: DOS (22)                                       Next 's appt.: 22   Visit# / total visits: 33/36 (2-3x wk)                       Cancels/No Shows: 0/1    Subjective: Pt arrives with no pain in her R shoulder, but states that she has started to have some discomfort in her L shoulder, but is unsure how this started. Pain:   Yes   X No  Location: R deltoid     Pain Rating: (0-10 scale) 0/10  Pain altered Tx:  x No  - Yes  Action:  Comments:              Objective:  Modalities: vaso 15' low compression.    Precautions: General                                Exercises: Utilize medium RTC repair protocol  Exercise  R RTC Repair     S/P week 13 ()    Reps/ Time Weight/ Level Comments    Codie 10'  No resistance x   IR stretch 3x30\"     x          Prone ext x30 2#     Prone row x30 2#     Prone HAB 3x10 1#  x          SL abduction 2x10 2#  x   SL ER eccentric x15 3# Assistance needed for concentric     SL ER  2x10 2# Assistance needed for end range x   SL HAB 3x10 1#  x          Supine ABC's 2x 2#      Supine punches 2x10 3#            Overhead shoulder press  1# Arm held at a 45°           T-band:       ER walkouts 2x10 [place] One step x          AROM       Flexion  2x10 2#  x   Scaption 2x12 1#  x   Other:         Specific Instructions for next treatment:  Continue with strengthening    Treatment Charges: Mins Units   []  Modalities     [x]  Ther Exercise 41 3KX   []  Manual Therapy      []  Ther Activities     []  Aquatics     [x]  Vasocompression 15 1KX   []  Other     Total Treatment time 56 4KX       Assessment: [x] Progressing toward goals. Continued to work on and focus on strengthening program today with pt demonstrating good tolerance and better strength compared to last treatment with writing therapist. Progressed reps today with SL HAB, as well as increased reps with SL ER/abd. Also able to increase weight with standing flexion/scaption and increased resistance with ER walkouts. Pt continues to require cueing with standing flexion and scaption to avoid any UT compensations with fair carryover after cued to correct. Pt continues to report that she has still been having difficulty with completion of ER motions. Ended session with vaso for soreness. [] No change. [] Other:    [x] Patient would continue to benefit from skilled physical therapy services in order to:Progress R shoulder strength, ROM, and functional use following R RTC repair      - Goals addressed by Rima Pinto, PT  STG: (to be met in 10 treatments)  1. ? Pain: Pt to decrease pain levels to 4/10 to help improve tolerance to progression through ROM.: MET (4/20/22)  2. Pt will self-report better tolerance to sleeping to help improve quality of sleep.:ONGOING, reports sleeping has improved, however can still have issues (5/19/22)  3. Independent with Home Exercise Programs: MET (4/20/22)     LTG: (to be met in 20 treatments)  1.  Improve score of UEFS from 82% impaired to <50% impaired, demonstrating improved functional use of R UE.: ONGOING, pt scored 65% impaired (22)  2. ? ROM: Increase R UE PROM to full in all planes, allowing for transition to full AROM.: ONGOING (22)  3. Decrease pain to 0/10: ONGOING (22)     LT treatments  1. Pt will demonstrate R AROM equal to L to reduce need for compensations with functional use  2. Pt will demonstrate R shoulder strength to 4+/5 grossly, reducing need for compensations with use of R UE.  3. Pt will demonstrate ability to complete all overhead motion with R UE without any compensations with UT, improve mechanics with completion. 4. Improve score of UEFS from 82% impaired to <30% impaired, demonstrating improved functional use of R UE. Patient goals: \"Regain movement without pain\"    Pt. Education:  [x] Yes  [] No  [x] Reviewed Prior HEP/Ed  Method of Education: [x] Verbal  [x] Demo  [] Written  Comprehension of Education:  [x] Verbalizes understanding. [x] Demonstrates understanding. [x] Needs review. [] Demonstrates/verbalizes HEP/Ed previously given. Plan: [x] Continue current frequency toward long and short term goals. [x] Specific Instructions for subsequent treatments: Continue tx per Protocol      Time In: 901        Time Out:  1000     Electronically signed by:   Otoniel Wood PT

## 2022-06-09 NOTE — PRE-CERTIFICATION NOTE
Medicare Cap   [x] Physical Therapy  [] Speech Therapy  [] Occupational therapy  *PT and Speech caps combine      $2150 Limit for PT and Speech combined  $2150 Limit for OT individually  At the beginning of the month where you expect to go over $2150, please add the [address] [place] modifier      Patient Name: Ynes Del Toro: 1955    Note:  This is an estimate of charges billed. Date of [address] Name # units/ charge $$$ charge Daily Total Charge Ongoing Total $$$   3/14/22 Eval, therex 1/1 98.01+22.84+9.21 130.06 130.06   3/16/22 2TE,man,vaso 2,1,1 29.21+22.84+21.34+9.21 82.60 212.66   3/18/22 3TE, man  29.21+22.84+22.84+21.34 96.23 308.89   3/21/22 2TE, vaso 2,1 29.21+22.84+9.21 61.26 370.50   3/23/22 2TE,vaso 2,1  61.26 431.41   3/25/22 2TE, 1 vaso 2/1  61.26 492.67   3/28/22 TE, Vaso 2/1  61.26 553.93   3/30/22 TE, Vaso 3/1  84.10 638.03   4/1/22 TE, vaso 2/1  61.26 699.29   4/4/22 TE, vaso 2/1  61.26 760.55   4/6/22 TE, vaso 2/1  61.26 821.81   4/8/22 TE, vaso 3/1  84.10 905.91   4/11/22 TE, vaso 3/1  84.10 990.01   4/13/22 TE, vaso 3/1  84.10 1074.11   4/14/22 TE, vaso 3/1  84.10 1158.21   4/18/22 TE, vaso 3/1  84.10 1242.31   4/20/22 TE, vaso 3/1  84.10 1326.41   4/22/22 TE, vaso 3/1  84.10 1410.51   4/25/22 TE, vaso 3/1  84.10 1494.61   4/27/22 TE, vaso 4/1  106.94 1601.65   4/29/22 TE, vaso 4/1  106.94 1708.49   5/2/22 TE, vaso 4/1  106.94 1815.43   5/4/22 TE, vaso 4/1  106.94 1922.37   5/6/22 TE, vaso 3/1  84.10 2006.47   5/10/22 TE, vaso 3/1  84.10 2090.57   5/12/22 TE, vsao 3/1  84.10 2174.67-KX   5/17/22 TE, vaso 3/1  84.10 2258. 77- KX   5/18 TE, vaso 3/1  84.10 2342.87 KX   5/26 TE, Manual, Vaso 2/1/1 29.21+22.84+21.34+9.21 82.60 2425. 47KX   5/31 TE, man, vaso   82.60 2508. 07KX   6/2 TE2, man, vaso   82.60 2590.67KX   6/7 TE3, vaso  29.21+22.84+22.84+9.21 84.10 2674. 77KX   6/9/22 TE, vaso 3/1  84.10 2758.87 KX

## 2022-06-14 ENCOUNTER — HOSPITAL ENCOUNTER (OUTPATIENT)
Dept: PHYSICAL THERAPY | Facility: CLINIC | Age: 67
Setting detail: THERAPIES SERIES
Discharge: HOME | End: 2022-06-14
Payer: MEDICARE

## 2022-06-14 PROCEDURE — 97016 VASOPNEUMATIC DEVICE THERAPY: CPT

## 2022-06-14 PROCEDURE — 97110 THERAPEUTIC EXERCISES: CPT

## 2022-06-14 NOTE — PRE-CERTIFICATION NOTE
Medicare Cap   [x] Physical Therapy  [] Speech Therapy  [] Occupational therapy  *PT and Speech caps combine      $2150 Limit for PT and Speech combined  $2150 Limit for OT individually  At the beginning of the month where you expect to go over $2150, please add the [address] [place] modifier      Patient Name: Alessandro Mail: 1955    Note:  This is an estimate of charges billed. Date of [address] Name # units/ charge $$$ charge Daily Total Charge Ongoing Total $$$   3/14/22 Eval, therex 1/1 98.01+22.84+9.21 130.06 130.06   3/16/22 2TE,man,vaso 2,1,1 29.21+22.84+21.34+9.21 82.60 212.66   3/18/22 3TE, man  29.21+22.84+22.84+21.34 96.23 308.89   3/21/22 2TE, vaso 2,1 29.21+22.84+9.21 61.26 370.50   3/23/22 2TE,vaso 2,1  61.26 431.41   3/25/22 2TE, 1 vaso 2/1  61.26 492.67   3/28/22 TE, Vaso 2/1  61.26 553.93   3/30/22 TE, Vaso 3/1  84.10 638.03   4/1/22 TE, vaso 2/1  61.26 699.29   4/4/22 TE, vaso 2/1  61.26 760.55   4/6/22 TE, vaso 2/1  61.26 821.81   4/8/22 TE, vaso 3/1  84.10 905.91   4/11/22 TE, vaso 3/1  84.10 990.01   4/13/22 TE, vaso 3/1  84.10 1074.11   4/14/22 TE, vaso 3/1  84.10 1158.21   4/18/22 TE, vaso 3/1  84.10 1242.31   4/20/22 TE, vaso 3/1  84.10 1326.41   4/22/22 TE, vaso 3/1  84.10 1410.51   4/25/22 TE, vaso 3/1  84.10 1494.61   4/27/22 TE, vaso 4/1  106.94 1601.65   4/29/22 TE, vaso 4/1  106.94 1708.49   5/2/22 TE, vaso 4/1  106.94 1815.43   5/4/22 TE, vaso 4/1  106.94 1922.37   5/6/22 TE, vaso 3/1  84.10 2006.47   5/10/22 TE, vaso 3/1  84.10 2090.57   5/12/22 TE, vsao 3/1  84.10 2174.67-KX   5/17/22 TE, vaso 3/1  84.10 2258. 77- KX   5/18 TE, vaso 3/1  84.10 2342.87 KX   5/26 TE, Manual, Vaso 2/1/1 29.21+22.84+21.34+9.21 82.60 2425. 47KX   5/31 TE, man, vaso   82.60 2508. 07KX   6/2 TE2, man, vaso   82.60 2590.67KX   6/7 TE3, vaso  29.21+22.84+22.84+9.21 84.10 2674. 77KX   6/9/22 TE, vaso 3/1  84.10 2758.87 KX   6/14 TE3, vaso   84.10 2842.97 KX

## 2022-06-14 NOTE — FLOWSHEET NOTE
[] [facility]) - [facility] &  Therapy  [address]  [phone]  F: [phone] [] [address]  [place] [address]   [address]  P: [phone]  F: [phone] [x] [address] &  Therapy  [address]  P: [phone]  F: [phone] [] [address]  P: [phone]  F: [phone] [] [address]  [facility]   [address]   [place]: [phone]  F: [phone]      Physical Therapy Daily Treatment Note    Date:  2022  Patient Name:  Cathleen     :  1955  MRN: [id number]   Physician: Danny Servin MD                   Insurance: MEDICARE (Med. [employer].)  Medical Diagnosis: N21.307 - Status post right rotator cuff repair                Rehab Codes: X58.052, M25.611  Onset Date: DOS (22)                                       Next 's appt.: 22   Visit# / total visits: 34/36 (2-3x wk)                       Cancels/No Shows: 0/1    Subjective:    Pain:   Yes   X No  Location: R deltoid     Pain Rating: (0-10 scale) 0/10  Pain altered Tx:  x No  - Yes  Action:  Comments:  Pt mentioned that her shoulder is feeling pretty good today. Objective:  Modalities: vaso 15' low compression.    Precautions: General                                Exercises: Utilize medium RTC repair protocol  Exercise  R RTC Repair     S/P week 13 ()    Reps/ Time Weight/ Level Comments    A[place]yne 10'  No resistance xx   Lat stretch 10x10\"  On wall // bar xx   Posterior capsule S 10x10\"   xx   IR stretch 3x30\"     xx          Prone ext x30 2#  xx   Prone row x30 2#  xx   Prone HAB 3x10 1#  xx          SL abduction 2x10 2#  xx   SL ER  2x10 2#  xx   SL HAB 3x10 1#  xx          Supine ABC's 2x 2#   xx   Supine punches 2x10 3#  xx Overhead shoulder press  1# Arm held at a 45° xx          T-band:       ER walkouts 2x10 Lime One step xx          AROM       Flexion  2x10 2#  xx   Scaption 2x12 1#  xx   Other:         Specific Instructions for next treatment:  Continue with strengthening    Treatment Charges: Mins Units   []  Modalities     [x]  Ther Exercise 43 3KX   []  Manual Therapy      []  Ther Activities     []  Aquatics     [x]  Vasocompression 15 1KX   []  Other     Total Treatment time 58 4KX       Assessment: [x] Progressing toward goals. Added in new stretches to program to help loosen up tight structures. Continued with all strengthening exercises per POC. Pt did require min verbal cueing for mechanical corrections but pt able to self correct on most occasions. Finished with vaso at the end of the session. [] No change. [] Other:    [x] Patient would continue to benefit from skilled physical therapy services in order to:Progress R shoulder strength, ROM, and functional use following R RTC repair      - Goals addressed by Stu Scott PT  STG: (to be met in 10 treatments)  1. ? Pain: Pt to decrease pain levels to 4/10 to help improve tolerance to progression through ROM.: MET (22)  2. Pt will self-report better tolerance to sleeping to help improve quality of sleep.:ONGOING, reports sleeping has improved, however can still have issues (22)  3. Independent with Home Exercise Programs: MET (22)     LTG: (to be met in 20 treatments)  1. Improve score of UEFS from 82% impaired to <50% impaired, demonstrating improved functional use of R UE.: ONGOING, pt scored 65% impaired (22)  2. ? ROM: Increase R UE PROM to full in all planes, allowing for transition to full AROM.: ONGOING (22)  3. Decrease pain to 0/10: ONGOING (22)     LT treatments  1. Pt will demonstrate R AROM equal to L to reduce need for compensations with functional use  2.  Pt will demonstrate R shoulder strength to 4+/5 grossly, reducing need for compensations with use of R UE.  3. Pt will demonstrate ability to complete all overhead motion with R UE without any compensations with UT, improve mechanics with completion. 4. Improve score of UEFS from 82% impaired to <30% impaired, demonstrating improved functional use of R UE. Patient goals: \"Regain movement without pain\"    Pt. Education:  [x] Yes  [] No  [x] Reviewed Prior HEP/Ed  Method of Education: [x] Verbal  [x] Demo  [] Written  Comprehension of Education:  [x] Verbalizes understanding. [x] Demonstrates understanding. [x] Needs review. [] Demonstrates/verbalizes HEP/Ed previously given. Plan: [x] Continue current frequency toward long and short term goals.     [x] Specific Instructions for subsequent treatments: Continue tx per Protocol      Time In: 0900        Time Out:  8790     Electronically signed by:  Alexandrea Barraza PTA

## 2022-06-16 ENCOUNTER — HOSPITAL ENCOUNTER (OUTPATIENT)
Dept: PHYSICAL THERAPY | Facility: CLINIC | Age: 67
Setting detail: THERAPIES SERIES
Discharge: HOME | End: 2022-06-16
Payer: MEDICARE

## 2022-06-16 PROCEDURE — 97110 THERAPEUTIC EXERCISES: CPT

## 2022-06-16 PROCEDURE — 97016 VASOPNEUMATIC DEVICE THERAPY: CPT

## 2022-06-16 NOTE — FLOWSHEET NOTE
[] [facility]) - [facility] &  Therapy  [address]  [phone]  F: [phone] [] [address]  [place] [address]   [address]  P: [phone]  F: [phone] [x] [address] &  Therapy  [address]  P: [phone]  F: [phone] [] [address]  P: [phone]  F: [phone] [] [address]  [facility]   [address]   [place]: [phone]  F: [phone]      Physical Therapy Daily Treatment Note    Date:  2022  Patient Name:  Giovanny Plascencia    :  1955  MRN: [id number]   Physician: Sonu Walton MD                   Insurance: MEDICARE (Med. [employer].)  Medical Diagnosis: I70.156 - Status post right rotator cuff repair                Rehab Codes: M19.218, M25.611  Onset Date: DOS (22)                                       Next 's appt.: 22   Visit# / total visits: 35/36 (2-3x wk)                       Cancels/No Shows: 0/1    Subjective:    Pain:   Yes   X No  Location: R deltoid     Pain Rating: (0-10 scale) 0/10  Pain altered Tx:  x No  - Yes  Action:  Comments:   Pt stated that her shoulder has been feeling much better. Objective:  Modalities: vaso 15' low compression.    Precautions: General                                Exercises: Utilize medium RTC repair protocol  Exercise  R RTC Repair     S/P week 13 ()    Reps/ Time Weight/ Level Comments    Elliptical  8'  No resistance x   Lat stretch 10x10\"  On wall // bar x   Posterior capsule S 10x10\"   x   IR stretch 3x30\"     x          Prone ext x30 2#  x   Prone row x30 2#  x   Prone HAB 3x10 1#  x          SL abduction 2x10 2#  x   SL ER  2x10 2#  x   SL HAB 3x10 1#  x          Supine ABC's 2x 2#   x   Supine punches 2x10 3#  x          Overhead shoulder press  1# Arm held at a 45° x          T-band:       ER walkouts 2x10 Lime One step x          AROM       Flexion  2x10 2#  x   Scaption 2x12 1#  x   Other:         Specific Instructions for next treatment:  Continue with strengthening    Treatment Charges: Mins Units   []  Modalities     [x]  Ther Exercise 44 3KX   []  Manual Therapy      []  Ther Activities     []  Aquatics     [x]  Vasocompression 15 1KX   []  Other     Total Treatment time 59 4KX       Assessment: [x] Progressing toward goals. Pt able to complete all exercises with an improved motion and technique. Pt most difficulty with ER in side lying but able to increase resistance with walk outs with no issues. Continued with vaso at the end of the session to help relief of soreness. [] No change. [] Other:    [x] Patient would continue to benefit from skilled physical therapy services in order to:Progress R shoulder strength, ROM, and functional use following R RTC repair      - Goals addressed by Jus Marc PT  STG: (to be met in 10 treatments)  1. ? Pain: Pt to decrease pain levels to 4/10 to help improve tolerance to progression through ROM.: MET (22)  2. Pt will self-report better tolerance to sleeping to help improve quality of sleep.:ONGOING, reports sleeping has improved, however can still have issues (22)  3. Independent with Home Exercise Programs: MET (22)     LTG: (to be met in 20 treatments)  1. Improve score of UEFS from 82% impaired to <50% impaired, demonstrating improved functional use of R UE.: ONGOING, pt scored 65% impaired (22)  2. ? ROM: Increase R UE PROM to full in all planes, allowing for transition to full AROM.: ONGOING (22)  3. Decrease pain to 0/10: ONGOING (22)     LT treatments  1. Pt will demonstrate R AROM equal to L to reduce need for compensations with functional use  2.  Pt will demonstrate R shoulder strength to 4+/5 grossly, reducing need for compensations with use of R UE.  3. Pt will demonstrate ability to complete all overhead motion with R UE without any compensations with UT, improve mechanics with completion. 4. Improve score of UEFS from 82% impaired to <30% impaired, demonstrating improved functional use of R UE. Patient goals: \"Regain movement without pain\"    Pt. Education:  [x] Yes  [] No  [x] Reviewed Prior HEP/Ed  Method of Education: [x] Verbal  [x] Demo  [] Written  Comprehension of Education:  [x] Verbalizes understanding. [x] Demonstrates understanding. [x] Needs review. [] Demonstrates/verbalizes HEP/Ed previously given. Plan: [x] Continue current frequency toward long and short term goals.     [x] Specific Instructions for subsequent treatments: Continue tx per Protocol      Time In: 6248        Time Out:  1001     Electronically signed by:  David Nelson PTA

## 2022-06-21 ENCOUNTER — HOSPITAL ENCOUNTER (OUTPATIENT)
Dept: PHYSICAL THERAPY | Facility: CLINIC | Age: 67
Setting detail: THERAPIES SERIES
Discharge: HOME | End: 2022-06-21
Payer: MEDICARE

## 2022-06-21 ENCOUNTER — TELEPHONE (OUTPATIENT)
Dept: ORTHOPEDIC SURGERY | Age: 67
End: 2022-06-21

## 2022-06-21 PROCEDURE — 97016 VASOPNEUMATIC DEVICE THERAPY: CPT

## 2022-06-21 PROCEDURE — 97110 THERAPEUTIC EXERCISES: CPT

## 2022-06-21 NOTE — TELEPHONE ENCOUNTER
Patient called in requesting that Dr. Dustin Levy send in excuse as the patient has jury duty on 06/28/22, patient is still in PT and is requesting that she be excused from going that day. Patient provided a fax number where the excuse can be sent [phone]. Please advise thank you!

## 2022-06-21 NOTE — PRE-CERTIFICATION NOTE
Medicare Cap   [x] Physical Therapy  [] Speech Therapy  [] Occupational therapy  *PT and Speech caps combine      $2150 Limit for PT and Speech combined  $2150 Limit for OT individually  At the beginning of the month where you expect to go over $2150, please add the [address] [place] modifier      Patient Name: Tomy Peraza: 1955    Note:  This is an estimate of charges billed. Date of [address] Name # units/ charge $$$ charge Daily Total Charge Ongoing Total $$$   3/14/22 Eval, therex 1/1 98.01+22.84+9.21 130.06 130.06   3/16/22 2TE,man,vaso 2,1,1 29.21+22.84+21.34+9.21 82.60 212.66   3/18/22 3TE, man  29.21+22.84+22.84+21.34 96.23 308.89   3/21/22 2TE, vaso 2,1 29.21+22.84+9.21 61.26 370.50   3/23/22 2TE,vaso 2,1  61.26 431.41   3/25/22 2TE, 1 vaso 2/1  61.26 492.67   3/28/22 TE, Vaso 2/1  61.26 553.93   3/30/22 TE, Vaso 3/1  84.10 638.03   4/1/22 TE, vaso 2/1  61.26 699.29   4/4/22 TE, vaso 2/1  61.26 760.55   4/6/22 TE, vaso 2/1  61.26 821.81   4/8/22 TE, vaso 3/1  84.10 905.91   4/11/22 TE, vaso 3/1  84.10 990.01   4/13/22 TE, vaso 3/1  84.10 1074.11   4/14/22 TE, vaso 3/1  84.10 1158.21   4/18/22 TE, vaso 3/1  84.10 1242.31   4/20/22 TE, vaso 3/1  84.10 1326.41   4/22/22 TE, vaso 3/1  84.10 1410.51   4/25/22 TE, vaso 3/1  84.10 1494.61   4/27/22 TE, vaso 4/1  106.94 1601.65   4/29/22 TE, vaso 4/1  106.94 1708.49   5/2/22 TE, vaso 4/1  106.94 1815.43   5/4/22 TE, vaso 4/1  106.94 1922.37   5/6/22 TE, vaso 3/1  84.10 2006.47   5/10/22 TE, vaso 3/1  84.10 2090.57   5/12/22 TE, vsao 3/1  84.10 2174.67-KX   5/17/22 TE, vaso 3/1  84.10 2258. 77- KX   5/18 TE, vaso 3/1  84.10 2342.87 KX   5/26 TE, Manual, Vaso 2/1/1 29.21+22.84+21.34+9.21 82.60 2425. 47KX   5/31 TE, man, vaso   82.60 2508. 07KX   6/2 TE2, man, vaso   82.60 2590.67KX   6/7 TE3, vaso  29.21+22.84+22.84+9.21 84.10 2674. 77KX   6/9/22 TE, vaso 3/1  84.10 2758.87 KX   6/14 TE3, vaso   84.10 2842.97 KX   6/16 TE3, vaso   84.10 2927.07 KX   6/21 TE3, vaso   84.10 3011.17 KX

## 2022-06-21 NOTE — FLOWSHEET NOTE
[] [facility]) - [facility] &  Therapy  [address]  [phone]  F: [phone] [] [address]  [place] [address]   [address]  P: [phone]  F: [phone] [x] [address] &  Therapy  [address]  P: [phone]  F: [phone] [] [address]  P: [phone]  F: [phone] [] [address]  [facility]   [address]   [facility] [place]: [phone]  F: [phone]      Physical Therapy Daily Treatment Note    Date:  2022  Patient Name:  Piero Vazquez    :  1955  MRN: [id number]   Physician: Mahamed Boyle MD                   Insurance: MEDICARE (Med. [employer].)  Medical Diagnosis: Z36.638 - Status post right rotator cuff repair                Rehab Codes: A95.379, M25.611  Onset Date: DOS (22)                                       Next 's appt.: 22   Visit# / total visits: 36/36 (2-3x wk)                       Cancels/No Shows: 0/1    Subjective:    Pain:   Yes   X No  Location: R deltoid     Pain Rating: (0-10 scale) 0/10  Pain altered Tx:  x No  - Yes  Action:  Comments:   Pt with no major complaints today. Objective:  Modalities: vaso 15' low compression.    Precautions: General                                Exercises: Utilize medium RTC repair protocol  Exercise  R RTC Repair     S/P week 13 ()    Reps/ Time Weight/ Level Comments    Elliptical  8'  No resistance x   Lat stretch 10x10\"  On wall // bar x   Posterior capsule S 10x10\"   x   IR stretch 3x30\"     x          Prone ext x30 3#  x   Prone row x30 3#  x   Prone HAB 3x10 2#  x          SL abduction 2x10 2#  x   SL ER  2x10 2#  x   SL HAB 3x10 1#  x          Supine ABC's 2x 3#   x   Supine punches 2x10 3#  x          Overhead shoulder press 20x 2# Arm held at a 45° x          T-band:       ER walkouts 2x10 Lime One step x          Standing        Flexion  2x10 2#  x   Scaption 2x10 2#  x   Other:         Specific Instructions for next treatment:  Continue with strengthening    Treatment Charges: Mins Units   []  Modalities     [x]  Ther Exercise 43 3KX   []  Manual Therapy      []  Ther Activities     []  Aquatics     [x]  Vasocompression 15 1KX   []  Other     Total Treatment time 58 4KX       Assessment: [x] Progressing toward goals. Pt demo improvement of body mechanics with exercises. Pt able to progress weight in all exercises with the exception of sidelying HAB and ER. Able to maintain good form with walk outs but fatigued at end of session. Finished with vaso. [] No change. [] Other:    [x] Patient would continue to benefit from skilled physical therapy services in order to:Progress R shoulder strength, ROM, and functional use following R RTC repair      - Goals addressed by Soniya Chu PT  STG: (to be met in 10 treatments)  1. ? Pain: Pt to decrease pain levels to 4/10 to help improve tolerance to progression through ROM.: MET (22)  2. Pt will self-report better tolerance to sleeping to help improve quality of sleep.:ONGOING, reports sleeping has improved, however can still have issues (22)  3. Independent with Home Exercise Programs: MET (22)     LTG: (to be met in 20 treatments)  1. Improve score of UEFS from 82% impaired to <50% impaired, demonstrating improved functional use of R UE.: ONGOING, pt scored 65% impaired (22)  2. ? ROM: Increase R UE PROM to full in all planes, allowing for transition to full AROM.: ONGOING (22)  3. Decrease pain to 0/10: ONGOING (22)     LT treatments  1. Pt will demonstrate R AROM equal to L to reduce need for compensations with functional use  2.  Pt will demonstrate R shoulder strength to 4+/5 grossly, reducing need for compensations with use of R UE.  3. Pt will demonstrate ability to complete all overhead motion with R UE without any compensations with UT, improve mechanics with completion. 4. Improve score of UEFS from 82% impaired to <30% impaired, demonstrating improved functional use of R UE. Patient goals: \"Regain movement without pain\"    Pt. Education:  [x] Yes  [] No  [x] Reviewed Prior HEP/Ed  Method of Education: [x] Verbal  [x] Demo  [] Written  Comprehension of Education:  [x] Verbalizes understanding. [x] Demonstrates understanding. [x] Needs review. [] Demonstrates/verbalizes HEP/Ed previously given. Plan: [x] Continue current frequency toward long and short term goals.     [x] Specific Instructions for subsequent treatments: Continue tx per Protocol      Time In: 9:00am       Time Out:  9:59am     Electronically signed by:  Pearl Escobar PTA

## 2022-06-23 ENCOUNTER — HOSPITAL ENCOUNTER (OUTPATIENT)
Dept: PHYSICAL THERAPY | Facility: CLINIC | Age: 67
Setting detail: THERAPIES SERIES
Discharge: HOME | End: 2022-06-23
Payer: MEDICARE

## 2022-06-23 PROCEDURE — 97016 VASOPNEUMATIC DEVICE THERAPY: CPT

## 2022-06-23 PROCEDURE — 97110 THERAPEUTIC EXERCISES: CPT

## 2022-06-23 NOTE — PRE-CERTIFICATION NOTE
Medicare Cap   [x] Physical Therapy  [] Speech Therapy  [] Occupational therapy  *PT and Speech caps combine      $2150 Limit for PT and Speech combined  $2150 Limit for OT individually  At the beginning of the month where you expect to go over $2150, please add the [address] [place] modifier      Patient Name: Jennie Regalado: 1955    Note:  This is an estimate of charges billed. Date of [address] Name # units/ charge $$$ charge Daily Total Charge Ongoing Total $$$   3/14/22 Eval, therex 1/1 98.01+22.84+9.21 130.06 130.06   3/16/22 2TE,man,vaso 2,1,1 29.21+22.84+21.34+9.21 82.60 212.66   3/18/22 3TE, man  29.21+22.84+22.84+21.34 96.23 308.89   3/21/22 2TE, vaso 2,1 29.21+22.84+9.21 61.26 370.50   3/23/22 2TE,vaso 2,1  61.26 431.41   3/25/22 2TE, 1 vaso 2/1  61.26 492.67   3/28/22 TE, Vaso 2/1  61.26 553.93   3/30/22 TE, Vaso 3/1  84.10 638.03   4/1/22 TE, vaso 2/1  61.26 699.29   4/4/22 TE, vaso 2/1  61.26 760.55   4/6/22 TE, vaso 2/1  61.26 821.81   4/8/22 TE, vaso 3/1  84.10 905.91   4/11/22 TE, vaso 3/1  84.10 990.01   4/13/22 TE, vaso 3/1  84.10 1074.11   4/14/22 TE, vaso 3/1  84.10 1158.21   4/18/22 TE, vaso 3/1  84.10 1242.31   4/20/22 TE, vaso 3/1  84.10 1326.41   4/22/22 TE, vaso 3/1  84.10 1410.51   4/25/22 TE, vaso 3/1  84.10 1494.61   4/27/22 TE, vaso 4/1  106.94 1601.65   4/29/22 TE, vaso 4/1  106.94 1708.49   5/2/22 TE, vaso 4/1  106.94 1815.43   5/4/22 TE, vaso 4/1  106.94 1922.37   5/6/22 TE, vaso 3/1  84.10 2006.47   5/10/22 TE, vaso 3/1  84.10 2090.57   5/12/22 TE, vsao 3/1  84.10 2174.67-KX   5/17/22 TE, vaso 3/1  84.10 2258. 77- KX   5/18 TE, vaso 3/1  84.10 2342.87 KX   5/26 TE, Manual, Vaso 2/1/1 29.21+22.84+21.34+9.21 82.60 2425. 47KX   5/31 TE, man, vaso   82.60 2508. 07KX   6/2 TE2, man, vaso   82.60 2590.67KX   6/7 TE3, vaso  29.21+22.84+22.84+9.21 84.10 2674. 77KX   6/9/22 TE, vaso 3/1  84.10 2758.87 KX   6/14 TE3, vaso   84.10 2842.97 KX   6/16 TE3, vaso   84.10 2927.07 KX   6/21 TE3, vaso   84.10 3011.17 KX   6/23/22 TE3, vaso 3/1  84.10 3095.27

## 2022-06-23 NOTE — FLOWSHEET NOTE
[] [facility]) - [facility]  [facility]  [address]  [phone]  F: [phone] [] [address]  [place] [address]   [address]  P: [phone]  F: [phone] [x] [address] &  Therapy  [address] [facility]  P: [phone]  F: [phone] [] [address]  P: [phone]  F: [phone] [] [address]  [facility]   [address]   [place]: [phone]  F: [phone]      Physical Therapy Daily Treatment Note    Date:  2022  Patient Name:  Jose Gonzalez    :  1955  MRN: [id number]   Physician: Mahsa Rocha MD                   Insurance: MEDICARE (Med. [employer])  Medical Diagnosis: Z02.450 - Status post right rotator cuff repair                Rehab Codes: J75.250, M25.611  Onset Date: DOS (22)                                       Next 's appt.: 22   Visit# / total visits: 37/48     (1x wk)                       Cancels/No Shows: 0/1    Subjective:    Pain:   XYes   No  Location: R deltoid     Pain Rating: (0-10 scale) 4-5/10  Pain altered Tx:  x No  - Yes  Action:  Comments: Pt reports an increase in shoulder pain on arrival d/t being extra active with grand child over the week. Objective:  Modalities: vaso 15' low compression.    Precautions: General                                Exercises: Utilize medium RTC repair protocol  Exercise  R RTC Repair     S/P week 13 ()    Reps/ Time Weight/ Level Comments    Elliptical  8'  No resistance x   Lat stretch 10x10\"  On wall // bar x   Posterior capsule S 10x10\"   x   IR stretch 3x30\"     x          Prone ext x30 3#  x   Prone row x30 3#  x   Prone HAB 3x10 2#  x          SL abduction 2x10 2#  x   SL ER  2x10 2#  x   SL HAB 3x10 1#  x          Supine ABC's 2x 3# x   Supine punches 2x10 3#  x          Overhead shoulder press 20x 2# Arm held at a 45° x          T-band:       ER walkouts 2x10 Lime One step x          Standing        Flexion  2x10 2#  x   Scaption 2x10 2#  x   Other:         Specific Instructions for next treatment:  Continue with strengthening    Treatment Charges: Mins Units   []  Modalities     [x]  Ther Exercise 43 3KX   []  Manual Therapy      []  Ther Activities     []  Aquatics     [x]  Vasocompression 15 1KX   []  Other     Total Treatment time 58 4KX       Assessment: [x] Progressing toward goals. Continued present regiment with no progressions d/t increase in pain on arrival. Pt demo difficulty completing SL ER and abd with cues in avoiding compensations. Discussion with primary PT regaurding continuing POC, pt will be  to 1x wk with additional of 12 visits max, visits were updated above. Pt continues to be restricted in ER ROM, ed on utilizing doorways to promote stretch at 3x30\" with good understanding and carryover post. Pt was given blank calender with ed on creating personalized HEP in order to maintain consistency in compliancy moving forward. Concluded tx with vaso for pain management. [] No change. [x] Other: Since her last progress report, pt has demonstrated improvements in her strength and pain. Pt reports that functional use of her shoulder continues to improve, as well as noting that weights used with exercises continue to get easier.  At this time, pt reports that she feels ready to transition to 1x/week to keep working on strengthening, but planning on preparing for DC to [facility], PT  [x] Patient would continue to benefit from skilled physical therapy services in order to:Progress R shoulder strength, ROM, and functional use following R RTC repair      - Goals addressed by William Bass, PT  STG: (to be met in 10 treatments)  1. ? Pain: Pt to decrease pain levels to 4/10 to help improve tolerance to progression through ROM.: MET (22)  2. Pt will self-report better tolerance to sleeping to help improve quality of sleep.:ONGOING, reports sleeping has improved, however can still have issues (22)  3. Independent with Home Exercise Programs: MET (22)     LTG: (to be met in 20 treatments)  1. Improve score of UEFS from 82% impaired to <50% impaired, demonstrating improved functional use of R UE.: ONGOING, pt scored 65% impaired (22)  2. ? ROM: Increase R UE PROM to full in all planes, allowing for transition to full AROM.: ONGOING (22)  3. Decrease pain to 0/10: ONGOING (22)     LT treatments  1. Pt will demonstrate R AROM equal to L to reduce need for compensations with functional use  2. Pt will demonstrate R shoulder strength to 4+/5 grossly, reducing need for compensations with use of R UE.  3. Pt will demonstrate ability to complete all overhead motion with R UE without any compensations with UT, improve mechanics with completion. 4. Improve score of UEFS from 82% impaired to <30% impaired, demonstrating improved functional use of R UE. Patient goals: \"Regain movement without pain\"    Pt. Education:  [x] Yes  [] No  [x] Reviewed Prior HEP/Ed  Method of Education: [x] Verbal  [x] Demo  [] Written  Comprehension of Education:  [x] Verbalizes understanding. [x] Demonstrates understanding. [x] Needs review. [] Demonstrates/verbalizes HEP/Ed previously given. Plan: [x] Continue current frequency toward long and short term goals.     [x] Specific Instructions for subsequent treatments: Continue tx per Protocol      Time In: 2:01pm       Time Out:  3:12pm    Electronically signed by:  Thompson Mckeon PTA

## 2022-06-28 ENCOUNTER — APPOINTMENT (OUTPATIENT)
Dept: PHYSICAL THERAPY | Facility: CLINIC | Age: 67
End: 2022-06-28
Payer: MEDICARE

## 2022-06-30 ENCOUNTER — HOSPITAL ENCOUNTER (OUTPATIENT)
Dept: PHYSICAL THERAPY | Facility: CLINIC | Age: 67
Setting detail: THERAPIES SERIES
Discharge: HOME | End: 2022-06-30
Payer: MEDICARE

## 2022-06-30 PROCEDURE — 97016 VASOPNEUMATIC DEVICE THERAPY: CPT

## 2022-06-30 PROCEDURE — 97110 THERAPEUTIC EXERCISES: CPT

## 2022-06-30 NOTE — FLOWSHEET NOTE
[] [facility]) [facility] &  Therapy  [address]  [phone]  F: [phone] [] [address]  [facility] [place] [facility]\"" [address]   [address]  P: [phone]  F: [phone] [x] [address] &  Therapy  [address]  P: [phone]  F: [phone] [] [address]  P: [phone]  F: [phone] [] [address]  [facility]   [address]   [place]: [phone]  F: [phone]      Physical Therapy Daily Treatment Note    Date:  2022  Patient Name:  Ioana Aleman    :  1955  MRN: [id number]   Physician: Iglesia Crockett MD                   Insurance: MEDICARE (Med. [employer]  Medical Diagnosis: T38.414 - Status post right rotator cuff repair                Rehab Codes: O00.307, M25.611  Onset Date: DOS (22)                                       Next 's appt.: 22   Visit# / total visits: 38/48     (1x wk)                       Cancels/No Shows: 0/1    Subjective:    Pain:   XYes   No  Location: R deltoid     Pain Rating: (0-10 scale) 0/10  Pain altered Tx:  x No  - Yes  Action:  Comments: Pt reports an improvement and 0/10 pain on arrival. She stated the home exercise calender has been helping her become more \"accountable\" in maintaining compliance. Objective:  Modalities: vaso 15' low compression.    Precautions: General                                Exercises: Utilize medium RTC repair protocol  Exercise  R RTC Repair     S/P week 13 ()    Reps/ Time Weight/ Level Comments    Elliptical  8'  No resistance x   Lat stretch 10x10\"  On wall // bar x   Posterior capsule S 10x10\"   x   IR stretch 3x30\"     x          Prone ext 30x3\" 3#  x   Prone row 30x3\" 3#  x   Prone HAB 3x10 2#  x          SL abduction 3x10 2#  x   SL ER 3x10 2#  x   SL HAB 3x10 1#  x          Supine ABC's 2x 3#   x   Supine punches 2x10 3#  x          Overhead shoulder press 20x 2# Arm held at a 45° x          T-band:       ER walkouts 2x10 Lime One step x          Standing        Flexion  2x10 2#  x   Scaption 2x10 2#  x   Other:         Specific Instructions for next treatment:  Continue with strengthening    Treatment Charges: Mins Units   []  Modalities     [x]  Ther Exercise 43 3KX   []  Manual Therapy      []  Ther Activities     []  Aquatics     [x]  Vasocompression 15 1KX   []  Other     Total Treatment time 58 4KX       Assessment: [x] Progressing toward goals. Continued with ex regiment as listed above with minimal progressions listed in reps and hold time to promote strength and control. Pt continues to demo most difficulty in SL abd, prone HAB, and maintaining ER in walks outs. Associated \"clicking\" felt with SL abd near supraspinatus but denied any associated pain, just mm weakness. Pt requested to continue with vaso concluding tx for sxs relief post tx.       [] No change. [x] Other: Since her last progress report, pt has demonstrated improvements in her strength and pain. Pt reports that functional use of her shoulder continues to improve, as well as noting that weights used with exercises continue to get easier. At this time, pt reports that she feels ready to transition to 1x/week to keep working on strengthening, but planning on preparing for DC to [facility], PT  [x] Patient would continue to benefit from skilled physical therapy services in order to:Progress R shoulder strength, ROM, and functional use following R RTC repair      - Goals addressed by Daksha Loyola, PT  STG: (to be met in 10 treatments)  1. ? Pain: Pt to decrease pain levels to 4/10 to help improve tolerance to progression through ROM.: MET (4/20/22)  2.  Pt will self-report better tolerance to sleeping to help improve quality of sleep.:ONGOING, reports sleeping has improved, however can still have issues (22)  3. Independent with Home Exercise Programs: MET (22)     LTG: (to be met in 20 treatments)  1. Improve score of UEFS from 82% impaired to <50% impaired, demonstrating improved functional use of R UE.: ONGOING, pt scored 65% impaired (22)  2. ? ROM: Increase R UE PROM to full in all planes, allowing for transition to full AROM.: ONGOING (22)  3. Decrease pain to 0/10: ONGOING (22)     LT treatments  1. Pt will demonstrate R AROM equal to L to reduce need for compensations with functional use  2. Pt will demonstrate R shoulder strength to 4+/5 grossly, reducing need for compensations with use of R UE.  3. Pt will demonstrate ability to complete all overhead motion with R UE without any compensations with UT, improve mechanics with completion. 4. Improve score of UEFS from 82% impaired to <30% impaired, demonstrating improved functional use of R UE. Patient goals: \"Regain movement without pain\"    Pt. Education:  [x] Yes  [] No  [x] Reviewed Prior HEP/Ed  Method of Education: [x] Verbal  [x] Demo  [] Written  Comprehension of Education:  [x] Verbalizes understanding. [x] Demonstrates understanding. [x] Needs review. [] Demonstrates/verbalizes HEP/Ed previously given. Plan: [x] Continue current frequency toward long and short term goals.     [x] Specific Instructions for subsequent treatments: Continue tx per Protocol      Time In: 9:00am      Time Out: 10:06am    Electronically signed by:  Cris Kendall PTA

## 2022-07-01 ENCOUNTER — OFFICE VISIT (OUTPATIENT)
Dept: ORTHOPEDIC SURGERY | Age: 67
End: 2022-07-01
Payer: MEDICARE

## 2022-07-01 DIAGNOSIS — M17.11 OSTEOARTHRITIS OF RIGHT KNEE, UNSPECIFIED OSTEOARTHRITIS TYPE: Primary | ICD-10-CM

## 2022-07-01 PROCEDURE — 20610 DRAIN/INJ JOINT/BURSA W/O US: CPT | Performed by: ORTHOPAEDIC SURGERY

## 2022-07-01 RX ORDER — BUPIVACAINE HYDROCHLORIDE 5 MG/ML
2 INJECTION, SOLUTION PERINEURAL ONCE
Status: COMPLETED | OUTPATIENT
Start: 2022-07-01 | End: 2022-07-01

## 2022-07-01 RX ORDER — LIDOCAINE HYDROCHLORIDE 10 MG/ML
2 INJECTION, SOLUTION INFILTRATION; PERINEURAL ONCE
Status: COMPLETED | OUTPATIENT
Start: 2022-07-01 | End: 2022-07-01

## 2022-07-01 RX ORDER — BETAMETHASONE SODIUM PHOSPHATE AND BETAMETHASONE ACETATE 3; 3 MG/ML; MG/ML
12 INJECTION, SUSPENSION INTRA-ARTICULAR; INTRALESIONAL; INTRAMUSCULAR; SOFT TISSUE ONCE
Status: COMPLETED | OUTPATIENT
Start: 2022-07-01 | End: 2022-07-01

## 2022-07-01 RX ADMIN — BETAMETHASONE SODIUM PHOSPHATE AND BETAMETHASONE ACETATE 12 MG: 3; 3 INJECTION, SUSPENSION INTRA-ARTICULAR; INTRALESIONAL; INTRAMUSCULAR; SOFT TISSUE at 12:57

## 2022-07-01 RX ADMIN — LIDOCAINE HYDROCHLORIDE 2 ML: 10 INJECTION, SOLUTION INFILTRATION; PERINEURAL at 12:57

## 2022-07-01 RX ADMIN — BUPIVACAINE HYDROCHLORIDE 10 MG: 5 INJECTION, SOLUTION PERINEURAL at 12:57

## 2022-07-01 NOTE — PROGRESS NOTES
Rick Ley M.D.            [address], [address] [facility]           Dept Phone: [phone]           Dept Fax:  [phone]           [place]orge Champion          Dept Phone: [phone]           Dept Fax:  [phone]      Chief Compliant:  Chief Complaint   Patient presents with    Pain     Rt knee        History of Present Illness: This is a 77 y.o. female who presents to the clinic today for evaluation / follow up of knee pain. Please see prior dictation. Patient had returned back on May 13 however her injection was within a 3-month window from that and did not get injection that day she is for here 1 today. She says she gets tremendous relief from these. .       Review of Systems   Constitutional: Negative for fever, chills, sweats. Eyes: Negative for changes in vision, or pain. HENT: Negative for ear ache, epistaxis, or sore throat. Respiratory/Cardio: Negative for Chest pain, palpitations, SOB, or cough. Gastrointestinal: Negative for abdominal pain, N/V/D. Genitourinary: Negative for dysuria, frequency, urgency, or hematuria. Neurological: Negative for headache, numbness, or weakness. Integumentary: Negative for rash, itching, laceration, or abrasion. Musculoskeletal: Positive for Pain (Rt knee)       Physical Exam:  Constitutional: Patient is oriented to person, place, and time. Patient appears well-developed and well nourished. HENT: Negative otherwise noted  Head: Normocephalic and Atraumatic  Nose: Normal  Eyes: Conjunctivae and EOM are normal  Neck: Normal range of motion Neck supple. Respiratory/Cardio: Effort normal. No respiratory distress.   Musculoskeletal: Examination of her knee was limited although just to show that she has maintained excellent motion 0 to 130 degrees she has nothing on Courtney's ligamentously she is grossly intact no patellofemoral findings no effusion. Neurological: Patient is alert and oriented to person, place, and time. Normal strenght. No sensory deficit. Skin: Skin is warm and dry  Psychiatric: Behavior is normal. Thought content normal.  Nursing note and vitals reviewed. Labs and Imaging:     XR taken today: No new x-rays taken today  No results found. No orders of the defined types were placed in this encounter. Assessment and Plan:  1. Osteoarthritis of right knee, unspecified osteoarthritis type            This is a 77 y.o. female who presents to the clinic today for evaluation / follow up for right knee arthritis    Past History:    Current Outpatient Medications:     gabapentin (NEURONTIN) 600 MG tablet, Take 2 tablets by mouth nightly for 360 days. , Disp: 180 tablet, Rfl: 3    meloxicam (MOBIC) 15 MG tablet, Take 1 tablet by mouth daily, Disp: 90 tablet, Rfl: 3    escitalopram (LEXAPRO) 5 MG tablet, Take 1 tablet by mouth nightly, Disp: 90 tablet, Rfl: 3    losartan (COZAAR) 25 MG tablet, Take 1 tablet by mouth daily, Disp: 90 tablet, Rfl: 3    mirtazapine (REMERON) 30 MG tablet, Take 1 tablet by mouth nightly, Disp: 90 tablet, Rfl: 3    montelukast (SINGULAIR) 10 MG tablet, Take 1 tablet by mouth nightly, Disp: 90 tablet, Rfl: 3    omeprazole (PRILOSEC) 40 MG delayed release capsule, Take 1 capsule by mouth every morning (before breakfast) 1hr. Before eat breakfast, Disp: 90 capsule, Rfl: 3    rosuvastatin (CRESTOR) 10 MG tablet, rosuvastatin 10 mg tablet, Disp: 90 tablet, Rfl: 3    Continuous Blood Gluc Sensor (FREESTYLE JUANI 2 SENSOR) MISC, As directed, change every 14 days, Disp: , Rfl:     metFORMIN (GLUCOPHAGE) 1000 MG tablet, Take 1,000 mg by mouth 2 times daily (with meals), Disp: , Rfl:     triamcinolone (KENALOG) 0.1 % cream, Apply topically 2 times daily. , Disp: 453 g, Rfl: 0    ibuprofen (ADVIL;MOTRIN) 800 MG tablet, Take 1 on file    Lack of Transportation (Non-Medical):  Not on file   Physical Activity:     Days of Exercise per Week: Not on file    Minutes of Exercise per Session: Not on file   Stress:     Feeling of Stress : Not on file   Social Connections:     Frequency of Communication with Friends and Family: Not on file    Frequency of Social Gatherings with Friends and Family: Not on file    Attends Hindu Services: Not on file    Active Member of Clubs or Organizations: Not on file    Attends Club or Organization Meetings: Not on file    Marital Status: Not on file   Intimate Partner Violence:     Fear of Current or Ex-Partner: Not on file    Emotionally Abused: Not on file    Physically Abused: Not on file    Sexually Abused: Not on file   Housing Stability:     Unable to Pay for Housing in the Last Year: Not on file    Number of Places Lived in the Last Year: Not on file    Unstable Housing in the Last Year: Not on file     Past Medical History:   Diagnosis Date    Acid reflux     Asthma     seasonal    Depression     Diabetes mellitus ([facility] [address]     type 2    Diabetic neuropathy ([facility] [address]     Hyperlipidemia     Neuropathy     PONV (postoperative nausea and vomiting)      Past Surgical History:   Procedure Laterality Date    APPENDECTOMY  07/07/2016    laparoscopic    BREAST BIOPSY Left     BREAST SURGERY      b/l reduction    COLONOSCOPY      HYSTERECTOMY      SHOULDER ARTHROSCOPY Right 02/22/2022    RIGHT SHOULDER ARTHROSCOPY ROTATOR CUFF REPAIR AND BICEPS TENODESIS WITH ARTHREX AND PRE OP INTERSCALENE BLOCK WITH EXPERAL (Right )    SHOULDER ARTHROSCOPY Right 2/22/2022    RIGHT SHOULDER ARTHROSCOPY ROTATOR CUFF REPAIR AND BICEPS TENODESIS WITH ARTHREX AND PRE OP INTERSCALENE BLOCK WITH EXPERAL performed by Deepali Teague MD at [address] [facility].     Family History   Problem Relation Age of Onset    Asthma Mother     High Blood Pressure Mother     Other Mother     Depression Mother     Heart Disease Father     High Cholesterol Father     High Blood Pressure Father     High Cholesterol Brother     An informed verbal consent for the procedure was obtained and risks including, but not limited to: allergy to medications, injection, bleeding, stiffness of joint, recurrence of symptoms, loss of function, swelling, drainage, irrigation, need for surgery and pseudo-septic inflammation, were explained to the patient. Also, discussed was the potential for further injections, irrigation and debridement and surgery. Alternate means of treatment have also been discussed with the patient. Under sterile conditions the patient's right knee was injected with lidocaine 2 cc bupivacaine 2 cc and Celestone 2 cc. She tolerated procedure well    Patient return back here at her discretion call if she has any future problems                      Provider Attestation:  Allison Verdugo, personally performed the services described in this documentation. All medical record entries made by the scribe were at my direction and in my presence. I have reviewed the chart and discharge instructions and agree that the records reflect my personal performance and is accurate and complete. Jacy Burrows MD. 07/01/22      Please note that this chart was generated using voice recognition Dragon dictation software. Although every effort was made to ensure the accuracy of this automated transcription, some errors in transcription may have occurred.

## 2022-07-07 ENCOUNTER — HOSPITAL ENCOUNTER (OUTPATIENT)
Dept: PHYSICAL THERAPY | Facility: CLINIC | Age: 67
Setting detail: THERAPIES SERIES
Discharge: HOME | End: 2022-07-07
Payer: MEDICARE

## 2022-07-07 PROCEDURE — 97110 THERAPEUTIC EXERCISES: CPT

## 2022-07-07 PROCEDURE — 97016 VASOPNEUMATIC DEVICE THERAPY: CPT

## 2022-07-07 NOTE — FLOWSHEET NOTE
[] [facility]) - [facility]  [facility]  [address]  [phone]  F: [phone] [] [address]  [address]   [address]  P: [phone]  F: [phone] [x] [address] &  Therapy  [address]  P: [phone]  F: [phone] [] [email or web address]  P: [phone]  F: [phone] [] [address]  [facility]   [address]   [place]: [phone]  F: [phone]      Physical Therapy Daily Treatment Note    Date:  2022  Patient Name:  Renetta Tamayo    :  1955  MRN: [id number]   Physician: Neda Sharp MD                   Insurance: MEDICARE ([employer])  Medical Diagnosis: K10.038 - Status post right rotator cuff repair                Rehab Codes: Q57.208, M25.611  Onset Date: DOS (22)                                       Next 's appt.: 22   Visit# / total visits: 39/48     (1x wk)                       Cancels/No Shows: 0/1    Subjective:    Pain:   Yes   xNo  Location: R deltoid     Pain Rating: (0-10 scale) 0/10  Pain altered Tx:  x No  - Yes  Action:  Comments: Pt reports no \"real pain\" just achy from sleeping on it. Objective:  Modalities: vaso 15' low compression.    Precautions: General                                Exercises: Utilize medium RTC repair protocol  Exercise  R RTC Repair     S/P week 13 ()    Reps/ Time Weight/ Level Comments    Elliptical  8'  No resistance x   Lat stretch 10x10\"  On wall // bar x   Posterior capsule S 10x10\"   x   IR stretch 3x30\"     x          Prone ext 30x3\" 3#  x   Prone row 30x3\" 3#  x   Prone HAB 3x10 2#  x          SL abduction 3x10 2#  x   SL ER  3x10 2#  x   SL HAB 3x10 1#  x          Supine ABC's 2x 3#   x   Supine punches 2x10 3#  x          Overhead shoulder press 20x 2# Arm held at a 45° x          T-band:       ER walkouts 2x10 Lime One step x          Standing        Flexion  2x10 2#  x   Scaption 2x10 2#  x   Other:         Specific Instructions for next treatment:  Continue with strengthening, start 5' manual to ER only. Treatment Charges: Mins Units   []  Modalities     [x]  Ther Exercise 43 3KX   []  Manual Therapy      []  Ther Activities     []  Aquatics     [x]  Vasocompression 15 1KX   []  Other     Total Treatment time 58 4KX       Assessment: [x] Progressing toward goals. Pt demo minimal improvement in standing scaption but continues to be difficult in avoid UT compensations. Added scapula assistance in SL ER and abd to improve ROM with fair carryover post. Pt was demonstrating painful clicking in SL HAB, had pt use L hand to allow rest in between reps and limited end range with noted improvement and eliminated pain completley. Discussed with PT in resuming manual in ER only to improve ROM d/t continued significant lacking and ed pt in increasing ER stretch daily. Concluded tx with vaso d/t pt request for sxs relief. [] No change. [] Other:  [x] Patient would continue to benefit from skilled physical therapy services in order to:Progress R shoulder strength, ROM, and functional use following R RTC repair      - Goals addressed by Lynn Rowan, PT  STG: (to be met in 10 treatments)  ? Pain: Pt to decrease pain levels to 4/10 to help improve tolerance to progression through ROM.: MET (4/20/22)  Pt will self-report better tolerance to sleeping to help improve quality of sleep.:ONGOING, reports sleeping has improved, however can still have issues (5/19/22)  Independent with Home Exercise Programs: MET (4/20/22)     LTG: (to be met in 20 treatments)  Improve score of UEFS from 82% impaired to <50% impaired, demonstrating improved functional use of R UE.: ONGOING, pt scored 65% impaired (5/19/22)  ?  ROM: Increase R UE PROM to full in all planes, allowing for transition to full AROM.: ONGOING (22)  Decrease pain to 0/10: ONGOING (22)     LT treatments  1. Pt will demonstrate R AROM equal to L to reduce need for compensations with functional use  2. Pt will demonstrate R shoulder strength to 4+/5 grossly, reducing need for compensations with use of R UE.  3. Pt will demonstrate ability to complete all overhead motion with R UE without any compensations with UT, improve mechanics with completion. 4. Improve score of UEFS from 82% impaired to <30% impaired, demonstrating improved functional use of R UE. Patient goals: \"Regain movement without pain\"    Pt. Education:  [x] Yes  [] No  [x] Reviewed Prior HEP/Ed  Method of Education: [x] Verbal  [x] Demo  [] Written  Comprehension of Education:  [x] Verbalizes understanding. [x] Demonstrates understanding. [x] Needs review. [] Demonstrates/verbalizes HEP/Ed previously given. Plan: [x] Continue current frequency toward long and short term goals.     [x] Specific Instructions for subsequent treatments: Continue tx per Protocol      Time In: 9:00am      Time Out: 10:13am    Electronically signed by:  Katherin Lo PTA

## 2022-07-07 NOTE — PRE-CERTIFICATION NOTE
Medicare Cap   [x] Physical Therapy  [] Speech Therapy  [] Occupational therapy  *PT and Speech caps combine      $2150 Limit for PT and Speech combined  $2150 Limit for OT individually  At the beginning of the month where you expect to go over $2150, please add the [address] [place] [address] modifier      Patient Name: Cinthia Estrada: 1955    Note:  This is an estimate of charges billed. Date of [address] Name # units/ charge $$$ charge Daily Total Charge Ongoing Total $$$   3/14/22 Eval, therex 1/1 98.01+22.84+9.21 130.06 130.06   3/16/22 2TE,man,vaso 2,1,1 29.21+22.84+21.34+9.21 82.60 212.66   3/18/22 3TE, man  29.21+22.84+22.84+21.34 96.23 308.89   3/21/22 2TE, vaso 2,1 29.21+22.84+9.21 61.26 370.50   3/23/22 2TE,vaso 2,1  61.26 431.41   3/25/22 2TE, 1 vaso 2/1  61.26 492.67   3/28/22 TE, Vaso 2/1  61.26 553.93   3/30/22 TE, Vaso 3/1  84.10 638.03   4/1/22 TE, vaso 2/1  61.26 699.29   4/4/22 TE, vaso 2/1  61.26 760.55   4/6/22 TE, vaso 2/1  61.26 821.81   4/8/22 TE, vaso 3/1  84.10 905.91   4/11/22 TE, vaso 3/1  84.10 990.01   4/13/22 TE, vaso 3/1  84.10 1074.11   4/14/22 TE, vaso 3/1  84.10 1158.21   4/18/22 TE, vaso 3/1  84.10 1242.31   4/20/22 TE, vaso 3/1  84.10 1326.41   4/22/22 TE, vaso 3/1  84.10 1410.51   4/25/22 TE, vaso 3/1  84.10 1494.61   4/27/22 TE, vaso 4/1  106.94 1601.65   4/29/22 TE, vaso 4/1  106.94 1708.49   5/2/22 TE, vaso 4/1  106.94 1815.43   5/4/22 TE, vaso 4/1  106.94 1922.37   5/6/22 TE, vaso 3/1  84.10 2006.47   5/10/22 TE, vaso 3/1  84.10 2090.57   5/12/22 TE, vsao 3/1  84.10 2174.67-KX   5/17/22 TE, vaso 3/1  84.10 2258. 77- KX   5/18 TE, vaso 3/1  84.10 2342.87 KX   5/26 TE, Manual, Vaso 2/1/1 29.21+22.84+21.34+9.21 82.60 2425. 47KX   5/31 TE, man, vaso   82.60 2508. 07KX   6/2 TE2, man, vaso   82.60 2590.67KX   6/7 TE3, vaso  29.21+22.84+22.84+9.21 84.10 2674. 77KX   6/9/22 TE, vaso 3/1  84.10 2758.87 KX   6/14 TE3, vaso   84.10 2842.97 KX   6/16 TE3, vaso   84.10 2927.07 KX   6/21 TE3, vaso   84.10 3011.17 KX   6/23/22 TE3, vaso 3/1  84.10 3095.27   6/30/22 TE 3, vaso 3/1 25.10+19.62+19.62+7.91 72.25 3167.52 KX   7/7/22 TE3, vaso 3,1 25.10+19.62+19.62+7.91 72.25 3239.77

## 2022-07-14 ENCOUNTER — HOSPITAL ENCOUNTER (OUTPATIENT)
Dept: PHYSICAL THERAPY | Facility: CLINIC | Age: 67
Setting detail: THERAPIES SERIES
Discharge: HOME | End: 2022-07-14
Payer: MEDICARE

## 2022-07-14 PROCEDURE — 97110 THERAPEUTIC EXERCISES: CPT

## 2022-07-14 PROCEDURE — 97016 VASOPNEUMATIC DEVICE THERAPY: CPT

## 2022-07-14 NOTE — PRE-CERTIFICATION NOTE
Medicare Cap   [x] Physical Therapy  [] Speech Therapy  [] Occupational therapy  *PT and Speech caps combine      $2150 Limit for PT and Speech combined  $2150 Limit for OT individually  At the beginning of the month where you expect to go over $2150, please add the [address] [place] modifier      Patient Name: Krysta Tim: 1955    Note:  This is an estimate of charges billed. Date of [address] Name # units/ charge $$$ charge Daily Total Charge Ongoing Total $$$   3/14/22 Eval, therex 1/1 98.01+22.84+9.21 130.06 130.06   3/16/22 2TE,man,vaso 2,1,1 29.21+22.84+21.34+9.21 82.60 212.66   3/18/22 3TE, man  29.21+22.84+22.84+21.34 96.23 308.89   3/21/22 2TE, vaso 2,1 29.21+22.84+9.21 61.26 370.50   3/23/22 2TE,vaso 2,1  61.26 431.41   3/25/22 2TE, 1 vaso 2/1  61.26 492.67   3/28/22 TE, Vaso 2/1  61.26 553.93   3/30/22 TE, Vaso 3/1  84.10 638.03   4/1/22 TE, vaso 2/1  61.26 699.29   4/4/22 TE, vaso 2/1  61.26 760.55   4/6/22 TE, vaso 2/1  61.26 821.81   4/8/22 TE, vaso 3/1  84.10 905.91   4/11/22 TE, vaso 3/1  84.10 990.01   4/13/22 TE, vaso 3/1  84.10 1074.11   4/14/22 TE, vaso 3/1  84.10 1158.21   4/18/22 TE, vaso 3/1  84.10 1242.31   4/20/22 TE, vaso 3/1  84.10 1326.41   4/22/22 TE, vaso 3/1  84.10 1410.51   4/25/22 TE, vaso 3/1  84.10 1494.61   4/27/22 TE, vaso 4/1  106.94 1601.65   4/29/22 TE, vaso 4/1  106.94 1708.49   5/2/22 TE, vaso 4/1  106.94 1815.43   5/4/22 TE, vaso 4/1  106.94 1922.37   5/6/22 TE, vaso 3/1  84.10 2006.47   5/10/22 TE, vaso 3/1  84.10 2090.57   5/12/22 TE, vsao 3/1  84.10 2174.67-KX   5/17/22 TE, vaso 3/1  84.10 2258. 77- KX   5/18 TE, vaso 3/1  84.10 2342.87 KX   5/26 TE, Manual, Vaso 2/1/1 29.21+22.84+21.34+9.21 82.60 2425. 47KX   5/31 TE, man, vaso   82.60 2508. 07KX   6/2 TE2, man, vaso   82.60 2590.67KX   6/7 TE3, vaso  29.21+22.84+22.84+9.21 84.10 2674. 77KX   6/9/22 TE, vaso 3/1  84.10 2758.87 KX   6/14 TE3, vaso   84.10 2842.97 KX   6/16 TE3, vaso   84.10 2927.07 KX   6/21 TE3, vaso   84.10 3011.17 KX   6/23/22 TE3, vaso 3/1  84.10 3095.27   6/30/22 TE 3, vaso 3/1 25.10+19.62+19.62+7.91 72.25 3167.52 KX   7/7/22 TE3, vaso 3,1 25.10+19.62+19.62+7.91 72.25 3239.77 KX   7/14/22 TE3, vaso 3,1 25.10+19.62+19.62+7.91 72.25 3312.02 KX

## 2022-07-14 NOTE — FLOWSHEET NOTE
[] [facility]) [facility] &  Therapy  [address]  [phone]  F: [phone] [] [address]  [facility]   [address]  P: [phone]  F: [phone] [x] [address] &  Therapy  [address]  P: [phone]  F: [phone] [] [address]  P: [phone]  F: [phone] [] [address]  [facility]   [address]   [place]: [phone]  F: [phone]      Physical Therapy Daily Treatment Note    Date:  2022  Patient Name:  Luciano Hernandez    :  1955  MRN: [id number]   Physician: La Baker MD                   Insurance: MEDICARE (Med. [employer])  Medical Diagnosis: G76.566 - Status post right rotator cuff repair                Rehab Codes: K85.521, M25.611  Onset Date: DOS (22)                                       Next 's appt.: 22   Visit# / total visits: 40/48     (1x wk)                       Cancels/No Shows: 0/1    Subjective:    Pain:   Yes   xNo  Location: R deltoid     Pain Rating: (0-10 scale) 0/10  Pain altered Tx:  x No  - Yes  Action:  Comments: Pt reports was sore after last tx in posterior shoulder but feels \"ok\" this AM with no pain. Objective:  Modalities: vaso 15' low compression.    Precautions: General                                Exercises: Utilize medium RTC repair protocol  Exercise  R RTC Repair     S/P week 13 ()    Reps/ Time Weight/ Level Comments    Elliptical  8'  No resistance x   Lat stretch 10x10\"  On wall // bar x   Posterior capsule S 10x10\"   x   IR stretch 3x30\"     x          Prone ext 30x3\" 3#  x   Prone row 30x3\" 3#  x   Prone HAB 3x10 2#  x          SL abduction 3x10 2#  x   SL ER  3x10 2#  x   SL HAB 3x10 2#  x          Supine ABC's 2x 3#   x   Supine punches 2x10 3#  x          Overhead shoulder press 20x 2# Arm held at a 45° x          T-band:       ER walkouts 2x10 Lime One step x          Standing        Flexion  2x10 2#  x   Scaption 2x10 2#  x   Other:  Manual: 7/14/22- 3' PROM ER 6x30\" hold, 2' hypervolt to lateral delt. Specific Instructions for next treatment:  Continue with strengthening, start 5' manual to ER only. Treatment Charges: Mins Units   []  Modalities     [x]  Ther Exercise 43 3KX   [x]  Manual Therapy  5    []  Ther Activities     []  Aquatics     [x]  Vasocompression 15 1KX   []  Other     Total Treatment time 61 4KX       Assessment: [x] Progressing toward goals. Improvement in overhead press ROM and control. Continues with compensations in AROM scaption, added scapula assist to improve motion. Continued with scapula assist in SL ER with noted improvement in last 5 reps. Pt was able to increase weight in SL HAB with good control and ed in eccentric control d/t reported minimal difficulty. Decreased reps in prone from 30>20 to allow time for manual including PROM in ER to improve motion (6x30\" hold) and hypervolt to lateral delt to decrease reported mm tension. Continued to conclude tx with vaso for sxs relief. [] No change. [] Other:  [x] Patient would continue to benefit from skilled physical therapy services in order to:Progress R shoulder strength, ROM, and functional use following R RTC repair      - Goals addressed by Gabrielle Lester PT  STG: (to be met in 10 treatments)  1. ? Pain: Pt to decrease pain levels to 4/10 to help improve tolerance to progression through ROM.: MET (4/20/22)  2. Pt will self-report better tolerance to sleeping to help improve quality of sleep.:ONGOING, reports sleeping has improved, however can still have issues (5/19/22)  3. Independent with Home Exercise Programs: MET (4/20/22)     LTG: (to be met in 20 treatments)  1.  Improve score of UEFS from 82% impaired to <50% impaired, demonstrating improved functional use of R UE.: ONGOING, pt scored 65% impaired (22)  2. ? ROM: Increase R UE PROM to full in all planes, allowing for transition to full AROM.: ONGOING (22)  3. Decrease pain to 0/10: ONGOING (22)     LT treatments  1. Pt will demonstrate R AROM equal to L to reduce need for compensations with functional use  2. Pt will demonstrate R shoulder strength to 4+/5 grossly, reducing need for compensations with use of R UE.  3. Pt will demonstrate ability to complete all overhead motion with R UE without any compensations with UT, improve mechanics with completion. 4. Improve score of UEFS from 82% impaired to <30% impaired, demonstrating improved functional use of R UE. Patient goals: \"Regain movement without pain\"    Pt. Education:  [x] Yes  [] No  [x] Reviewed Prior HEP/Ed  Method of Education: [x] Verbal  [x] Demo  [] Written  Comprehension of Education:  [x] Verbalizes understanding. [x] Demonstrates understanding. [x] Needs review. [] Demonstrates/verbalizes HEP/Ed previously given. Plan: [x] Continue current frequency toward long and short term goals.     [x] Specific Instructions for subsequent treatments: Continue tx per Protocol      Time In: 9:00am      Time Out: 10:15am    Electronically signed by:  Nuzhat Dos Santos PTA

## 2022-07-21 ENCOUNTER — HOSPITAL ENCOUNTER (OUTPATIENT)
Dept: PHYSICAL THERAPY | Facility: CLINIC | Age: 67
Setting detail: THERAPIES SERIES
Discharge: HOME | End: 2022-07-21
Payer: MEDICARE

## 2022-07-21 PROCEDURE — 97110 THERAPEUTIC EXERCISES: CPT

## 2022-07-21 PROCEDURE — 97016 VASOPNEUMATIC DEVICE THERAPY: CPT

## 2022-07-21 NOTE — FLOWSHEET NOTE
[] [facility]) - [facility] &  Therapy  [address]  [phone]  F: [phone] [] [address]  [facility]   [address]  P: [phone]  F: [phone] [x] [address] &  Therapy  [address]  P: [phone]  F: [phone] [] [address]  P: [phone]  F: [phone] [] [address]  [facility]   [address]   [place]: [phone]  F: [phone]      Physical Therapy Daily Treatment Note    Date:  2022  Patient Name:  Cammy Pang    :  1955  MRN: [id number]   Physician: Tammie Wyatt MD                   Insurance: MEDICARE ([employer]. [employer]  Medical Diagnosis: P82.253 - Status post right rotator cuff repair                Rehab Codes: W39.078, M25.611  Onset Date: DOS (22)                                       Next 's appt.: 22   Visit# / total visits: 41/48     (1x wk)                       Cancels/No Shows: 0/1    Subjective:    Pain:   Yes   xNo  Location: R deltoid     Pain Rating: (0-10 scale) 0/10  Pain altered Tx:  x No  - Yes  Action:  Comments: Pt reports no pain or issues on arrival and stated has noticed an improvement in her ROM since resuming manual.         Objective:  Modalities: vaso 15' low compression.    Precautions: General                                Exercises: Utilize medium RTC repair protocol  Exercise  R RTC Repair     S/P week 13 ()    Reps/ Time Weight/ Level Comments    Elliptical  8'  No resistance x   Lat stretch 10x10\"  On wall // bar x   Posterior capsule S 10x10\"   x   IR stretch 3x30\"     x          Prone ext 30x3\" 3#  x   Prone row 30x3\" 3#  x   Prone HAB 3x10 2#  x          SL abduction 3x10 2#  x   SL ER  3x10 2#  x   SL HAB 3x10 2#  x          Supine ABC's 2x 3#      Supine punches 2x10 3#  x          Overhead shoulder press 20x 3# Arm held at a 45° x          T-band:       ER walkouts 2x10 Lime One step x          Standing        Flexion  2x10 2#  x   Scaption 2x10 2#  x   Other:  Manual: 7/14/22- 3' PROM ER 6x30\" hold, 2' hypervolt to lateral delt. Specific Instructions for next treatment:  Continue with strengthening,     Treatment Charges: Mins Units   []  Modalities     [x]  Ther Exercise 43 3KX   [x]  Manual Therapy  5    []  Ther Activities     []  Aquatics     [x]  Vasocompression 15 1KX   []  Other     Total Treatment time 61 4KX       Assessment: [x] Progressing toward goals. Continue ex log with increase in weight in overhead press d/t visual improvement in ROM and strength with good carryover from pt. Pt demo improved control in teres minor with TB step outs and demo good awareness in mm fatigue. Continued with assist at end range in SL ER to promote improved ROM with good carryover during. Held hypervolt to lateral delt d/t no pain this date and added 2' to improve ER with good release noted. Continued to conclude tx with vaso by pt request for sxs relief. [] No change. [] Other:  [x] Patient would continue to benefit from skilled physical therapy services in order to:Progress R shoulder strength, ROM, and functional use following R RTC repair      - Goals addressed by Kenzie Gallardo, PT  STG: (to be met in 10 treatments)  ? Pain: Pt to decrease pain levels to 4/10 to help improve tolerance to progression through ROM.: MET (4/20/22)  Pt will self-report better tolerance to sleeping to help improve quality of sleep.:ONGOING, reports sleeping has improved, however can still have issues (5/19/22)  Independent with Home Exercise Programs: MET (4/20/22)     LTG: (to be met in 20 treatments)  Improve score of UEFS from 82% impaired to <50% impaired, demonstrating improved functional use of R UE.: ONGOING, pt scored 65% impaired (5/19/22)  ? ROM: Increase R UE PROM to full in all planes, allowing for transition to full AROM.: ONGOING (22)  Decrease pain to 0/10: ONGOING (22)     LT treatments  1. Pt will demonstrate R AROM equal to L to reduce need for compensations with functional use  2. Pt will demonstrate R shoulder strength to 4+/5 grossly, reducing need for compensations with use of R UE.  3. Pt will demonstrate ability to complete all overhead motion with R UE without any compensations with UT, improve mechanics with completion. 4. Improve score of UEFS from 82% impaired to <30% impaired, demonstrating improved functional use of R UE. Patient goals: \"Regain movement without pain\"    Pt. Education:  [x] Yes  [] No  [x] Reviewed Prior HEP/Ed  Method of Education: [x] Verbal  [x] Demo  [] Written  Comprehension of Education:  [x] Verbalizes understanding. [x] Demonstrates understanding. [x] Needs review. [] Demonstrates/verbalizes HEP/Ed previously given. Plan: [x] Continue current frequency toward long and short term goals.     [x] Specific Instructions for subsequent treatments: Continue tx per Protocol      Time In: 9:00am      Time Out: 10:15am    Electronically signed by:  Rafael Juarez PTA

## 2022-07-21 NOTE — PRE-CERTIFICATION NOTE
Medicare Cap   [x] Physical Therapy  [] Speech Therapy  [] Occupational therapy  *PT and Speech caps combine      $2150 Limit for PT and Speech combined  $2150 Limit for OT individually  At the beginning of the month where you expect to go over $2150, please add the [address] [place] [address] modifier      Patient Name: Andrew Fagan: 1955    Note:  This is an estimate of charges billed. Date of [address] Name # units/ charge $$$ charge Daily Total Charge Ongoing Total $$$   3/14/22 Eval, therex 1/1 98.01+22.84+9.21 130.06 130.06   3/16/22 2TE,man,vaso 2,1,1 29.21+22.84+21.34+9.21 82.60 212.66   3/18/22 3TE, man  29.21+22.84+22.84+21.34 96.23 308.89   3/21/22 2TE, vaso 2,1 29.21+22.84+9.21 61.26 370.50   3/23/22 2TE,vaso 2,1  61.26 431.41   3/25/22 2TE, 1 vaso 2/1  61.26 492.67   3/28/22 TE, Vaso 2/1  61.26 553.93   3/30/22 TE, Vaso 3/1  84.10 638.03   4/1/22 TE, vaso 2/1  61.26 699.29   4/4/22 TE, vaso 2/1  61.26 760.55   4/6/22 TE, vaso 2/1  61.26 821.81   4/8/22 TE, vaso 3/1  84.10 905.91   4/11/22 TE, vaso 3/1  84.10 990.01   4/13/22 TE, vaso 3/1  84.10 1074.11   4/14/22 TE, vaso 3/1  84.10 1158.21   4/18/22 TE, vaso 3/1  84.10 1242.31   4/20/22 TE, vaso 3/1  84.10 1326.41   4/22/22 TE, vaso 3/1  84.10 1410.51   4/25/22 TE, vaso 3/1  84.10 1494.61   4/27/22 TE, vaso 4/1  106.94 1601.65   4/29/22 TE, vaso 4/1  106.94 1708.49   5/2/22 TE, vaso 4/1  106.94 1815.43   5/4/22 TE, vaso 4/1  106.94 1922.37   5/6/22 TE, vaso 3/1  84.10 2006.47   5/10/22 TE, vaso 3/1  84.10 2090.57   5/12/22 TE, vsao 3/1  84.10 2174.67-KX   5/17/22 TE, vaso 3/1  84.10 2258. 77- KX   5/18 TE, vaso 3/1  84.10 2342.87 KX   5/26 TE, Manual, Vaso 2/1/1 29.21+22.84+21.34+9.21 82.60 2425. 47KX   5/31 TE, man, vaso   82.60 2508. 07KX   6/2 TE2, man, vaso   82.60 2590.67KX   6/7 TE3, vaso  29.21+22.84+22.84+9.21 84.10 2674. 77KX   6/9/22 TE, vaso 3/1  84.10 2758.87 KX   6/14 TE3, vaso   84.10 2842.97 KX   6/16 TE3, vaso   84.10 2927.07 KX   6/21 TE3, vaso   84.10 3011.17 KX   6/23/22 TE3, vaso 3/1  84.10 3095.27   6/30/22 TE 3, vaso 3/1 25.10+19.62+19.62+7.91 72.25 3167.52 KX   7/7/22 TE3, vaso 3,1 25.10+19.62+19.62+7.91 72.25 3239.77 KX   7/14/22 TE3, vaso 3,1 25.10+19.62+19.62+7.91 72.25 3312.02 KX   7/21/22 TE3, vaso 3,1 25.10+19.62+19.62+7.91 72.25 3384.27

## 2022-08-04 ENCOUNTER — HOSPITAL ENCOUNTER (OUTPATIENT)
Dept: PHYSICAL THERAPY | Facility: CLINIC | Age: 67
Setting detail: THERAPIES SERIES
Discharge: HOME | End: 2022-08-04
Payer: MEDICARE

## 2022-08-04 PROBLEM — E10.8 TYPE I DIABETES MELLITUS WITH COMPLICATION (HCC): Status: ACTIVE | Noted: 2021-05-19

## 2022-08-04 PROBLEM — E11.9 TYPE 2 DIABETES MELLITUS WITHOUT COMPLICATION (HCC): Status: RESOLVED | Noted: 2021-02-15 | Resolved: 2022-08-04

## 2022-08-04 PROCEDURE — 97110 THERAPEUTIC EXERCISES: CPT

## 2022-08-04 PROCEDURE — 97140 MANUAL THERAPY 1/> REGIONS: CPT

## 2022-08-04 NOTE — PROGRESS NOTES
Solitario Burnham, APRN-CNP  [facility]  [address] [address]  [address] [address]  Dept: [phone]  Dept Fax: [phone]     Patient ID: Hallie Christian is a 77 y.o. female. HPI    Hallie Christian is a 77 y.o. female Established patient who presents to the office today for a first visit and to establish a relationship with a new primary care provider. Previous PCP was Dr. Alexi Garcia who has left the practice. Today, the patient complains of none. - labs are being done on Monday. Preventative care, female:  Last colonoscopy: 2021  Last mammogram: 4/19/2021  Dexa Scan: 4/19/2021  Nicotine use: never  Alcohol use: no  Drug use: never  Dental exam: appt coming up goes every 6 months   Eye exam: glasses done in April     Specialists:  Steffen Villarreal    Previous office notes, labs, imaging and hospital records were reviewed prior to and during encounter. The patient's past medical, surgical, social, and family history as well as her current medications and allergies were reviewed as documented in today's encounter SUSIE Wilder. Current Outpatient Medications on File Prior to Visit   Medication Sig Dispense Refill    gabapentin (NEURONTIN) 600 MG tablet Take 2 tablets by mouth nightly for 360 days.  180 tablet 3    meloxicam (MOBIC) 15 MG tablet Take 1 tablet by mouth daily 90 tablet 3    escitalopram (LEXAPRO) 5 MG tablet Take 1 tablet by mouth nightly 90 tablet 3    losartan (COZAAR) 25 MG tablet Take 1 tablet by mouth daily 90 tablet 3    mirtazapine (REMERON) 30 MG tablet Take 1 tablet by mouth nightly 90 tablet 3    montelukast (SINGULAIR) 10 MG tablet Take 1 tablet by mouth nightly 90 tablet 3    omeprazole (PRILOSEC) 40 MG delayed release capsule Take 1 capsule by mouth every morning (before breakfast) 1hr. Before eat breakfast 90 capsule 3    rosuvastatin (CRESTOR) 10 MG tablet rosuvastatin 10 mg tablet 90 tablet 3 Continuous Blood Gluc Sensor (FREESTYLE JUANI 2 SENSOR) [facility] As directed, change every 14 days      metFORMIN (GLUCOPHAGE) 1000 MG tablet Take 1,000 mg by mouth 2 times daily (with meals)      triamcinolone (KENALOG) 0.1 % cream Apply topically 2 times daily. 453 g 0    ibuprofen (ADVIL;MOTRIN) 800 MG tablet Take 1 tablet by mouth 2 times daily as needed for Pain 60 tablet 1    fluticasone-salmeterol (ADVAIR DISKUS) 100-50 MCG/DOSE diskus inhaler Inhale 1 puff into the lungs 2 times daily 180 each 1    fluticasone (FLONASE) 50 MCG/ACT nasal spray 2 sprays by Nasal route daily 3 each 1    acyclovir (ZOVIRAX) 400 MG tablet Take 1 tablet by mouth 2 times daily (Patient taking differently: Take 400 mg by mouth 2 times daily PRN) 180 tablet 0    BD PEN NEEDLE WIN 2ND GEN 32G X 4 MM MISC       BASAGLAR KWIKPEN 100 UNIT/ML injection pen 22 Units nightly       NOVOLOG FLEXPEN 100 UNIT/ML injection pen Inject into the skin 3 times daily (before meals) Sliding sccale      aspirin 81 MG chewable tablet Take 81 mg by mouth daily      vitamin D 25 MCG (1000 UT) CAPS Take by mouth      GLUCOSAMINE HCL PO Take by mouth       No current facility-administered medications on file prior to visit. Subjective:     Review of Systems   Constitutional:  Negative for activity change, fatigue and fever. HENT:  Negative for congestion, ear pain, rhinorrhea and sore throat. Respiratory:  Negative for cough, chest tightness and shortness of breath. Cardiovascular:  Negative for chest pain and palpitations. Gastrointestinal:  Negative for abdominal distention, abdominal pain, constipation, diarrhea and nausea. Endocrine: Negative for polydipsia, polyphagia and polyuria. Genitourinary:  Negative for difficulty urinating and dysuria. Musculoskeletal:  Positive for arthralgias. Negative for back pain and myalgias. Skin:  Negative for color change and rash.    Neurological:  Negative for dizziness, weakness, light-headedness and headaches. Hematological:  Negative for adenopathy. Psychiatric/Behavioral:  Negative for agitation and behavioral problems. The patient is not nervous/anxious. Vitals:    08/05/22 0715   BP: 110/80   Pulse: 74   Resp: 16   Temp: 97.2 °F (36.2 °C)   SpO2: 97%     Objective:     Physical Exam  Vitals reviewed. Constitutional:       General: She is not in acute distress. Appearance: Normal appearance. HENT:      Head: Normocephalic and atraumatic. Right Ear: External ear normal.      Left Ear: External ear normal.      Nose: Nose normal.      Mouth/Throat:      Mouth: Mucous membranes are moist.      Pharynx: No oropharyngeal exudate or posterior oropharyngeal erythema. Eyes:      Extraocular Movements: Extraocular movements intact. Conjunctiva/sclera: Conjunctivae normal.      Pupils: Pupils are equal, round, and reactive to light. Cardiovascular:      Rate and Rhythm: Normal rate and regular rhythm. Pulses: Normal pulses. Heart sounds: Normal heart sounds. No murmur heard. Pulmonary:      Effort: Pulmonary effort is normal. No respiratory distress. Breath sounds: Normal breath sounds. No wheezing or rales. Abdominal:      General: Bowel sounds are normal. There is no distension. Palpations: Abdomen is soft. Tenderness: There is no abdominal tenderness. Musculoskeletal:         General: Normal range of motion. Cervical back: Normal range of motion. Right lower leg: No edema. Left lower leg: No edema. Lymphadenopathy:      Cervical: No cervical adenopathy. Skin:     General: Skin is warm and dry. Neurological:      General: No focal deficit present. Mental Status: She is alert and oriented to person, place, and time. Deep Tendon Reflexes: Reflexes normal.   Psychiatric:         Mood and Affect: Mood normal.         Behavior: Behavior normal. Behavior is cooperative. Assessment:      Diagnosis Orders   1. Gastroesophageal reflux disease without esophagitis        2. Hypercholesterolemia  Lipid Panel      3. Vitamin D deficiency  Vitamin D 25 Hydroxy      4. Screening mammogram for breast cancer  VINH DIGITAL SCREEN W OR WO CAD BILATERAL      5. Type I diabetes mellitus with complication (HCC)  CBC    Comprehensive Metabolic Panel    Microalbumin / Creatinine Urine Ratio      6. Mild intermittent asthma without complication  montelukast (SINGULAIR) 10 MG tablet      7. Preventative health care  CBC    Comprehensive Metabolic Panel    TSH with Reflex         Plan:     Gastroesophageal reflux disease without esophagitis  - Stable: Medication re-filled as needed, con't medications as prescribed, con't current tx plan  - Continue prilosec as previously prescribed  - Patient educated on avoiding trigger food/drinks such as caffeine, soda, chocolate, greasy/fatty, spicy foods.  - Sleep with head of bed elevated, avoid lying flat after eating and avoid restrictive clothing around waist.    Hypercholesterolemia  -  Stable: Medication re-filled as needed, con't medications as prescribed, con't current tx plan  - Lifestyle changes: Decrease fats, sugars, carbohydrates, and increase routine exercise, try to get 150 minutes of aerobic activity a week  - Foods that helps increase HDL include the following: Fish, nuts, flax, avocados, and legumes (such as soy, kidney beans, chickpeas). - Will cont with low fat/chol diet and crestor as ordered. Vitamin D deficiency  - Stable: Medication re-filled as needed, con't medications as prescribed, con't current tx plan  - Continue Vitamin D supplements as ordered, will continue to follow Vitamin D level  - Please increase foods with Vitamin D, which include cheese, eggs, cereals, yogurt, milk, tofu, any type of beef, salmon or tuna,  spinach, and mushroom. - will continue to monitor.      Type I diabetes mellitus with complication (HCC)  - Stable: Medication re-filled as needed, con't medications as prescribed, con't current tx plan  - will have A1c checked at appt with endo on Monday 8/8/2022.  - Continue with Metformin and insulin as previously prescribed. - Will cont with losartan for renal protection.  - Cont with daily foot exams and yearly eye exams. - continue to follow up with endocrinology. Mild intermittent asthma without complication  - Stable: Medication re-filled as needed, con't medications as prescribed, con't current tx plan  - Continue singulair and advair as previously prescribed. - Continue Albuterol PRN as previously prescribed. - Avoid triggers that may exacerbate symptoms. - continue to follow up with ortho for shoulder.     - pt verbalized understanding plan of care. Medications, labs, diagnostic studies, consultations and follow-up as documented in this encounter. Rest of systems unchanged, continue current treatments  On this date 8/5/2022 I have spent 30 minutes reviewing previous notes, test results and face to face with the patient discussing the diagnosis and importance of compliance with the treatment plan as well as documenting on the day of the visit. Messi Ronquillo.  APRN-CNP

## 2022-08-04 NOTE — PROGRESS NOTES
[] [facility] [facility]  [address]  [phone]  F: [phone] [] [address]  [employer]   [address]  P: [phone]  F: [phone] [x] [address] [facility]  [address] [facility]  P: [phone]  F: [phone] [] [address]  P: [phone]  F: [phone] [] [address]  [facility]   [address]   [place]: [phone]  F: [phone]      Physical Therapy Daily Treatment Note/ Progress Note    Date:  2022  Patient Name:  Nicolasa Ballesteros    :  1955  MRN: [id number]   Physician: Adrian Oden MD                   Insurance: MEDICARE (Med[employer] My [employer] Direct)  Medical Diagnosis: F93.152 - Status post right rotator cuff repair                Rehab Codes: K08.402, M25.611  Onset Date: DOS (22)                                       Next 's appt.: 22   Visit# / total visits: 42/48     (1x wk)                       Cancels/No Shows: 0/1    Subjective:  Pt arrives with \"achy\" shoulder, but denies any pain.    Pain:   Yes   x No  Location: R deltoid     Pain Rating: (0-10 scale) 0/10  Pain altered Tx:  x No  - Yes  Action:  Comments:          Objective:  Modalities: vaso 15' low compression.- Held today  Precautions: General                                Exercises: Utilize medium RTC repair protocol  Exercise  R RTC Repair     S/P week 13 ()    Reps/ Time Weight/ Level Comments    Elliptical  8'  No resistance    Lat stretch 10x10\"  On wall // bar    Posterior capsule S 10x10\"      IR stretch 3x30\"               Prone ext 30x3\" 3#     Prone row 30x3\" 3#     Prone HAB 3x10 2#            SL abduction 3x10 2#     SL ER  3x10 2#  x   SL HAB 3x10 2#            Supine ABC's 2x 3#      Supine punches 2x10 3# Overhead shoulder press 20x 3# Arm held at a 45°           T-band:       ER walkouts 2x10 [place] [employer] step           Standing        Flexion  2x10 2#     Scaption 2x10 2#     Other: Hypervolt R lateral shoulder/arm, DI R pec major/minor, Hypervolt R biceps tendon        ROM            A/P ROM A/P STRENGTH  STRENGTH    LEFT RIGHT LEFT RIGHT   SEATED SHLD FLEX   XXXXXXXX XXXXXXXXX   SEATED SHD ABD   XXXXXXXX XXXXXXXXX   SEATED IR   XXXXXXXX XXXXXXXX          SHLD FLEX  162  4/5, pain   SHLD ABD  174  4-/5, pain   SHLD ER  56  4/5   SHLD IR  74  4+/5   SUPRASPINATUS              ELBOW FLEX       ELBOW EXT. Specific Instructions for next treatment:  Pt to be placed on hold    Treatment Charges: Mins Units   []  Modalities     [x]  Ther Exercise 9 1 KX   [x]  Manual Therapy 16 1 KX   []  Ther Activities     []  Aquatics     []  Vasocompression     []  Other     Total Treatment time 25 2 KX       Assessment: [x] Progressing toward goals. Since starting therapy, pt reports that she has seen good progress in her R shoulder to date. Per pt, she has improved strength and ROM, she has less pain with use, and she has better functional use of her shoulder. Currently, pt reports that she is still having higher pain levels in her shoulder at times, as well as clicking and popping in shoulder with ER and overhead motion. Pt also notes that she still has trouble with ER AROM with pain at end range. At this time, pt reports that she feels confident with her HEP and states that she has been consistent with this since her last appointment. D/t this, pt reports that she feels that she would be ready to be DC to her HEP independently, but notes that she would like to have her f/u with surgeon first before being discharged. Plan to place pt on hold at this time until she has a f/u, and if pt does not call back to schedule following her visit pt will be DC at that time. [] No change.      [] Other:  [] Patient would continue to benefit from skilled physical therapy services in order to:Progress R shoulder strength, ROM, and functional use following R RTC repair      STG: (to be met in 10 treatments)  ? Pain: Pt to decrease pain levels to 4/10 to help improve tolerance to progression through ROM.: MET (22)  Pt will self-report better tolerance to sleeping to help improve quality of sleep.:NOT MET, still reports that sleeping is a problem and can lead to increased pain (22)  Independent with Home Exercise Programs: MET (22)     LTG: (to be met in 20 treatments)  Improve score of UEFS from 82% impaired to <50% impaired, demonstrating improved functional use of R UE.: MET (22)  ? ROM: Increase R UE PROM to full in all planes, allowing for transition to full AROM.: MET (22)  Decrease pain to 0/10: NOT MET, still reports up to 7/10 (22)     LT treatments  1. Pt will demonstrate R AROM equal to L to reduce need for compensations with functional use: NOT MET, still with limitations in ER (22)  2. Pt will demonstrate R shoulder strength to 4+/5 grossly, reducing need for compensations with use of R UE.: NOT MET (22)  3. Pt will demonstrate ability to complete all overhead motion with R UE without any compensations with UT, improve mechanics with completion.: NOT MET, still with compensations in abduction/scaption (22)  4. Improve score of UEFS from 82% impaired to <30% impaired, demonstrating improved functional use of R UE.: NOT MET, pt scored 34* impaired (22)     Patient goals: \"Regain movement without pain\"    Pt. Education:  [x] Yes  [] No  [x] Reviewed Prior HEP/Ed  Method of Education: [x] Verbal  [x] Demo  [] Written  Comprehension of Education:  [x] Verbalizes understanding. [x] Demonstrates understanding. [] Needs review. [x] Demonstrates/verbalizes HEP/Ed previously given. Plan: [] Continue current frequency toward long and short term goals.     [x] Specific Instructions for subsequent treatments: Pt to be placed on hold at this time until she follows up with physician      Time In: 1003       Time Out: 1100    Electronically signed by:   Britney Robles PT

## 2022-08-05 ENCOUNTER — OFFICE VISIT (OUTPATIENT)
Dept: FAMILY MEDICINE CLINIC | Age: 67
End: 2022-08-05
Payer: MEDICARE

## 2022-08-05 VITALS
BODY MASS INDEX: 26.46 KG/M2 | RESPIRATION RATE: 16 BRPM | SYSTOLIC BLOOD PRESSURE: 110 MMHG | TEMPERATURE: 97.2 F | DIASTOLIC BLOOD PRESSURE: 80 MMHG | HEART RATE: 74 BPM | OXYGEN SATURATION: 97 % | WEIGHT: 159 LBS

## 2022-08-05 DIAGNOSIS — E10.8 TYPE I DIABETES MELLITUS WITH COMPLICATION (HCC): ICD-10-CM

## 2022-08-05 DIAGNOSIS — Z12.31 SCREENING MAMMOGRAM FOR BREAST CANCER: ICD-10-CM

## 2022-08-05 DIAGNOSIS — K21.9 GASTROESOPHAGEAL REFLUX DISEASE WITHOUT ESOPHAGITIS: Primary | ICD-10-CM

## 2022-08-05 DIAGNOSIS — J45.20 MILD INTERMITTENT ASTHMA WITHOUT COMPLICATION: ICD-10-CM

## 2022-08-05 DIAGNOSIS — Z00.00 PREVENTATIVE HEALTH CARE: ICD-10-CM

## 2022-08-05 DIAGNOSIS — E78.00 HYPERCHOLESTEROLEMIA: ICD-10-CM

## 2022-08-05 DIAGNOSIS — E55.9 VITAMIN D DEFICIENCY: ICD-10-CM

## 2022-08-05 PROCEDURE — 3017F COLORECTAL CA SCREEN DOC REV: CPT | Performed by: NURSE PRACTITIONER

## 2022-08-05 PROCEDURE — 1036F TOBACCO NON-USER: CPT | Performed by: NURSE PRACTITIONER

## 2022-08-05 PROCEDURE — 3046F HEMOGLOBIN A1C LEVEL >9.0%: CPT | Performed by: NURSE PRACTITIONER

## 2022-08-05 PROCEDURE — 1090F PRES/ABSN URINE INCON ASSESS: CPT | Performed by: NURSE PRACTITIONER

## 2022-08-05 PROCEDURE — 99214 OFFICE O/P EST MOD 30 MIN: CPT | Performed by: NURSE PRACTITIONER

## 2022-08-05 PROCEDURE — G8427 DOCREV CUR MEDS BY ELIG CLIN: HCPCS | Performed by: NURSE PRACTITIONER

## 2022-08-05 PROCEDURE — 2022F DILAT RTA XM EVC RTNOPTHY: CPT | Performed by: NURSE PRACTITIONER

## 2022-08-05 PROCEDURE — G8417 CALC BMI ABV UP PARAM F/U: HCPCS | Performed by: NURSE PRACTITIONER

## 2022-08-05 PROCEDURE — 1123F ACP DISCUSS/DSCN MKR DOCD: CPT | Performed by: NURSE PRACTITIONER

## 2022-08-05 PROCEDURE — G8399 PT W/DXA RESULTS DOCUMENT: HCPCS | Performed by: NURSE PRACTITIONER

## 2022-08-05 RX ORDER — MONTELUKAST SODIUM 10 MG/1
10 TABLET ORAL NIGHTLY
Qty: 90 TABLET | Refills: 3 | Status: SHIPPED | OUTPATIENT
Start: 2022-08-05 | End: 2022-10-06 | Stop reason: SDUPTHER

## 2022-08-05 ASSESSMENT — ENCOUNTER SYMPTOMS
COLOR CHANGE: 0
RHINORRHEA: 0
BACK PAIN: 0
DIARRHEA: 0
ABDOMINAL DISTENTION: 0
ABDOMINAL PAIN: 0
NAUSEA: 0
SORE THROAT: 0
CHEST TIGHTNESS: 0
CONSTIPATION: 0
SHORTNESS OF BREATH: 0
COUGH: 0

## 2022-08-17 ENCOUNTER — OFFICE VISIT (OUTPATIENT)
Dept: ORTHOPEDIC SURGERY | Age: 67
End: 2022-08-17
Payer: MEDICARE

## 2022-08-17 VITALS — RESPIRATION RATE: 14 BRPM | WEIGHT: 161 LBS | BODY MASS INDEX: 26.82 KG/M2 | HEIGHT: 65 IN

## 2022-08-17 DIAGNOSIS — Z98.890 STATUS POST ROTATOR CUFF REPAIR: Primary | ICD-10-CM

## 2022-08-17 PROCEDURE — 1036F TOBACCO NON-USER: CPT | Performed by: ORTHOPAEDIC SURGERY

## 2022-08-17 PROCEDURE — 1123F ACP DISCUSS/DSCN MKR DOCD: CPT | Performed by: ORTHOPAEDIC SURGERY

## 2022-08-17 PROCEDURE — 3017F COLORECTAL CA SCREEN DOC REV: CPT | Performed by: ORTHOPAEDIC SURGERY

## 2022-08-17 PROCEDURE — G8427 DOCREV CUR MEDS BY ELIG CLIN: HCPCS | Performed by: ORTHOPAEDIC SURGERY

## 2022-08-17 PROCEDURE — 1090F PRES/ABSN URINE INCON ASSESS: CPT | Performed by: ORTHOPAEDIC SURGERY

## 2022-08-17 PROCEDURE — G8399 PT W/DXA RESULTS DOCUMENT: HCPCS | Performed by: ORTHOPAEDIC SURGERY

## 2022-08-17 PROCEDURE — 99212 OFFICE O/P EST SF 10 MIN: CPT | Performed by: ORTHOPAEDIC SURGERY

## 2022-08-17 PROCEDURE — G8417 CALC BMI ABV UP PARAM F/U: HCPCS | Performed by: ORTHOPAEDIC SURGERY

## 2022-08-17 NOTE — PROGRESS NOTES
Procedure: Right shoulder arthroscopic rotator cuff repair  Date of procedure: 2/22/22    HPI: Brayan Muhammad is a 77 y.o. female who is approximately 6 months status post the aforementioned procedure. She states that she is doing relatively well but still has some pain in her shoulder especially after use. She also has some pain in the morning when she first wakes up. She completed physical therapy about 2 weeks ago. Physical examination:  Resp 14   Ht 5' 5\" (1.651 m) Comment: per pt  Wt 161 lb (73 kg)   BMI 26.79 kg/m²   Evaluation of patient's right shoulder and upper extremity demonstrates her incisions to be healed appropriately. There is minimal to no swelling at this time. Sensation is grossly intact light touch in all dermatomes and she has a 2+ radial pulse. ROM: (Degrees)    Right   A P   Left   A P    Elevation  140    Elevation  145   Abduction  155    Abduction  160    ER   45    ER   60   IR   T12    IR   T10   90 abd/ER      90 abd/ER     90 abd/IR      90 abd/IR     Crepitation  No    Crepitation  No      Muscle strength:    Right       Left    Deltoid   5    Deltoid   5  Supraspinatus  3    Supraspinatus  5  ER   4    ER   5  IR   5    IR   5    Special tests    Right   Rotator Cuff    Left    n   Painful arc    n   n   Pain with ER    n    n   Neer's     n    y   Hawkin's    n    n   Drop Arm    n  n   Lift off/Belly Press   n  n   ER Lag    n      Impression and plan:Marianna Fraser is a 77 y.o. female  who is approximately 6 months status post left shoulder arthroscopic rotator cuff repair. She has some residual pain and certainly weakness on exam as outlined above. We once more had a discussion about this. My concern is that she has a persistent tear in the rotator cuff. We discussed what the options would be moving forward.   She is not interested in any additional surgery at this time and so I recommended she continue on with her home exercise program from physical therapy

## 2022-08-17 NOTE — DISCHARGE SUMMARY
[address] [facility]  Phone: [phone]  Fax: [phone]      Physical Therapy Discharge Note    Date: 2022      Patient: Chelita Pratt  : 1955  MRN: [id number]    Physician: Kaylynn Woo MD                    Medical Diagnosis: B57.050 - Status post right rotator cuff repair                Rehab Codes: T56.213, M25.611  Onset Date: DOS (22)                                        Visit# / total visits: 43                                                   Cancels/No Shows: 0/1  Date of initial visit: 3/14/22                   [] Patient recovered from conditions. Treatment goals were met. [] Patient received maximum benefit. No further therapy indicated at this time. [] Patient demonstrated improvement from condition with  ** Of  ** Short term goals met. []Patient demonstrated improvement from condition with **   Of **  Long term goals met. [x] Patient to continue exercise/home instructions independently. [] Therapy interrupted due to:    [] Patient has 2 or more no shows/cancels, is discontinued per our policy. [] Patient has completed prescribed number of treatment sessions. [] Other:      Pain level at evaluation was      2 /10 and at discharge was       0/10    It Is My Understanding That The:  [] Patient returned to work. [x] Patient demonstrated improved level of function. [] Patient returned to previous functional level.   [] Patient's current functional status is unknown due to no-shows  [] Other:     Recommendations/Comments:                   Treatment Included:     [x] Therapeutic Exercise   48688  [] Iontophoresis: 4 mg/mL Dexamethasone Sodium Phosphate  mAmin  78417   [] Therapeutic Activity  19369 [x] Vasopneumatic cold with compression  67525    [] Gait Training   97665 [] Ultrasound   74580   [] Neuromuscular Re-education  96414 [] Electrical Stimulation Unattended  49484   [x] Manual Therapy  12147 [] Electrical Stimulation Attended  [id number]   [x] Instruction in HEP  [] Lumbar/Cervical Traction  [id number]   [] Aquatic Therapy   [id number] [] Cold/hotpack    [] Massage   [id number]      [] Dry Needling, 1 or 2 muscles  48550   [] Biofeedback, first 15 minutes   04857  [] Biofeedback, additional 15 minutes   96722 [] Dry Needling, 3 or more muscles  86114                If you have any questions or concerns regarding this patient's care, please contact us. Thank you for your referral.      Electronically signed by:  Abhilash Pina PT

## 2022-09-19 NOTE — PROGRESS NOTES
Sunil Frank, RAMON-CNP  [address] [facility]  [address] [address], [address]  [facility] [facility] [address]  Dept: [phone]  Dept Fax: [phone]    TELEHEALTH EVALUATION -- Audio/Visual (During BTWSW-48 public health emergency)    HPI:    James Martin (:  1955) has requested an audio/video evaluation for the following concern(s):    Virtual visit today who complains of congestion, sore throat, post nasal drip, dry cough, myalgias, headache, and left sinus pain since last Friday. Patient does not have known exposure to someone who is positive for covid. Pt states she went to [place] came back last Friday and the symptoms started, she has tested for covid twice which has been negative. When she wakes up she is croupy and at times coughing so much she has to throw up. She has been using her inhaler. Otherwise pt doing well on current tx and no other concerns today. The patient's past medical, surgical, social, and family history as well as his current medications and allergies were reviewed as documented in today's encounter by SUSIE Gonzalez. Previous office notes, labs, imaging and hospital records were reviewed prior to and during encounter. Review of Systems   Constitutional:  Negative for activity change, fatigue and fever. HENT:  Positive for congestion, postnasal drip, rhinorrhea and sore throat. Negative for ear pain. Respiratory:  Positive for cough, chest tightness and shortness of breath. Cardiovascular:  Negative for chest pain and palpitations. Gastrointestinal:  Negative for abdominal distention, abdominal pain, constipation, diarrhea and nausea. Endocrine: Negative for polydipsia, polyphagia and polyuria. Genitourinary:  Negative for difficulty urinating and dysuria. Musculoskeletal:  Positive for myalgias. Negative for arthralgias and back pain. Skin:  Negative for color change and rash.    Neurological: Positive for headaches. Negative for dizziness, weakness and light-headedness. Hematological:  Negative for adenopathy. Psychiatric/Behavioral:  Negative for agitation and behavioral problems. The patient is not nervous/anxious. Prior to Visit Medications    Medication Sig Taking? Authorizing Provider   montelukast (SINGULAIR) 10 MG tablet Take 1 tablet by mouth nightly  RAMON Pereyra - CNP   gabapentin (NEURONTIN) 600 MG tablet Take 2 tablets by mouth nightly for 360 days. Latasha Easley MD   meloxicam (MOBIC) 15 MG tablet Take 1 tablet by mouth daily  Latasha Easley MD   escitalopram (LEXAPRO) 5 MG tablet Take 1 tablet by mouth nightly  Latasha Easley MD   losartan (COZAAR) 25 MG tablet Take 1 tablet by mouth daily  Latasha Easley MD   mirtazapine (REMERON) 30 MG tablet Take 1 tablet by mouth nightly  Latasha Easley MD   omeprazole (PRILOSEC) 40 MG delayed release capsule Take 1 capsule by mouth every morning (before breakfast) 1hr. Before eat breakfast  Latasha Easley MD   rosuvastatin (CRESTOR) 10 MG tablet rosuvastatin 10 mg tablet  Latasha Easley MD   Continuous Blood Gluc Sensor (FREESTYLE JUANI 2 SENSOR) MISC As directed, change every 14 days  Historical Provider, MD   metFORMIN (GLUCOPHAGE) 1000 MG tablet Take 1,000 mg by mouth 2 times daily (with meals)  Historical Provider, MD   triamcinolone (KENALOG) 0.1 % cream Apply topically 2 times daily.   Latasha Easley MD   ibuprofen (ADVIL;MOTRIN) 800 MG tablet Take 1 tablet by mouth 2 times daily as needed for Pain  Latasha Easley MD   fluticasone-salmeterol (ADVAIR DISKUS) 100-50 MCG/DOSE diskus inhaler Inhale 1 puff into the lungs 2 times daily  Latasha Easley MD   fluticasone (FLONASE) 50 MCG/ACT nasal spray 2 sprays by Nasal route daily  Latasha Easley MD   BD PEN NEEDLE WIN 2ND GEN 32G X 4 MM MISC   Historical Provider, MD INNA PONCE 100 UNIT/ML injection pen 22 Units nightly   Historical Provider, MD Kem Garcia 100 UNIT/ML injection pen Inject into the skin 3 times daily (before meals) Sliding sccale  Historical Provider, MD   aspirin 81 MG chewable tablet Take 81 mg by mouth daily  Historical Provider, MD   vitamin D 25 MCG (1000 UT) CAPS Take by mouth  Historical Provider, MD   GLUCOSAMINE HCL PO Take by mouth  Historical Provider, MD       Social History     Tobacco Use    Smoking status: Never    Smokeless tobacco: Never   Vaping Use    Vaping Use: Never used   Substance Use Topics    Alcohol use: No    Drug use: Never        Allergies   Allergen Reactions    Latex     Seasonal     Penicillins Rash   ,   Past Medical History:   Diagnosis Date    Acid reflux     Asthma     seasonal    Depression     Diabetes mellitus (HCC)     type 2    Diabetic neuropathy (HCC)     Hyperlipidemia     Neuropathy     PONV (postoperative nausea and vomiting)    ,   Past Surgical History:   Procedure Laterality Date    APPENDECTOMY  07/07/2016    laparoscopic    BREAST BIOPSY Left     BREAST SURGERY      b/l reduction    COLONOSCOPY      HYSTERECTOMY (CERVIX STATUS UNKNOWN)      SHOULDER ARTHROSCOPY Right 02/22/2022    RIGHT SHOULDER ARTHROSCOPY ROTATOR CUFF REPAIR AND BICEPS TENODESIS WITH ARTHREX AND PRE OP INTERSCALENE BLOCK WITH EXPERAL (Right )    SHOULDER ARTHROSCOPY Right 2/22/2022    RIGHT SHOULDER ARTHROSCOPY ROTATOR CUFF REPAIR AND BICEPS TENODESIS WITH ARTHREX AND PRE OP INTERSCALENE BLOCK WITH EXPERAL performed by Divine Rosas MD at [address]   ,   Social History     Tobacco Use    Smoking status: Never    Smokeless tobacco: Never   Vaping Use    Vaping Use: Never used   Substance Use Topics    Alcohol use: No    Drug use: Never   ,   Family History   Problem Relation Age of Onset    Asthma Mother     High Blood Pressure Mother     Other Mother     Depression Mother     Heart Disease Father     High Cholesterol Father     High Blood Pressure Father     High Cholesterol Brother    ,   Immunization History Administered Date(s) Administered    COVID-19, PFIZER GRAY top, DO NOT Dilute, (age 15 y+), IM, 30 mcg/0.3 mL 08/15/2022    COVID-19, PFIZER PURPLE top, DILUTE for use, (age 15 y+), 30mcg/0.3mL 02/25/2021, 03/18/2021, 09/30/2021    Influenza, FLUAD, (age 72 y+), Adjuvanted, 0.5mL 09/30/2021    Influenza, FLUARIX, FLULAVAL, FLUZONE (age 10 mo+) AND AFLURIA, (age 1 y+), PF, 0.5mL 10/23/2017, 10/10/2018    Influenza, FLUBLOK, (age 25 y+), PF, 0.5mL 10/13/2020    Influenza, FLUCELVAX, (age 10 mo+), MDCK, MDV, 0.5mL 10/29/2019    Pneumococcal Polysaccharide (Nzletnres25) 05/12/2016    Tdap (Boostrix, Adacel) 10/01/2018    Zoster Live (Zostavax) 05/12/2016    Zoster Recombinant (Shingrix) 12/14/2019, 02/25/2020   ,   Health Maintenance   Topic Date Due    Diabetic foot exam  Never done    Flu vaccine (1) 09/01/2022    Pneumococcal 65+ years Vaccine (2 - PCV) 08/05/2023 (Originally 5/12/2017)    Diabetic microalbuminuria test  11/26/2022    Lipids  11/26/2022    Annual Wellness Visit (AWV)  11/30/2022    Breast cancer screen  04/19/2023    Diabetic retinal exam  04/21/2023    Depression Screen  06/06/2023    A1C test (Diabetic or Prediabetic)  08/08/2023    DTaP/Tdap/Td vaccine (2 - Td or Tdap) 10/01/2028    Colorectal Cancer Screen  02/18/2031    DEXA (modify frequency per FRAX score)  Completed    Shingles vaccine  Completed    COVID-19 Vaccine  Completed    Hepatitis A vaccine  Aged Out    Hib vaccine  Aged Out    Meningococcal (ACWY) vaccine  Aged Out    Hepatitis C screen  Discontinued       PHYSICAL EXAMINATION:  [ INSTRUCTIONS:  \"[x]\" Indicates a positive item  \"[]\" Indicates a negative item  -- DELETE ALL ITEMS NOT EXAMINED]  Vital Signs: Not completed due to virtual visit.      Constitutional: [x] Appears well-developed and well-nourished [x] No apparent distress                            [] Abnormal-   Mental status  [x] Alert and awake  [x] Oriented to person/place/time [x]Able to follow commands Eyes:  EOM    [x]  Normal  [] Abnormal-  Sclera  [x]  Normal  [] Abnormal -         Discharge [x]  None visible  [] Abnormal -     HENT:   [x] Normocephalic, atraumatic. [] Abnormal   [x] Mouth/Throat: Mucous membranes are moist.      External Ears [x] Normal  [] Abnormal-      Neck: [x] No visualized mass      Pulmonary/Chest: [x] Respiratory effort normal.  [x] No visualized signs of difficulty breathing or respiratory distress        [] Abnormal-      Neurological:        [x] No Facial Asymmetry (Cranial nerve 7 motor function) (limited exam to video visit)                       [x] No gaze palsy        [] Abnormal-         Skin:                     [x] No significant exanthematous lesions or discoloration noted on facial skin         [] Abnormal-                                  Psychiatric:           [x] Normal Affect [x] No Hallucinations        [] Abnormal-      Other pertinent observable physical exam findings- Patient appears generally well, is speaking full sentences clearly without any observable SOB, no cough, no diaphoresis. Pt stable during video, does c/o tenderness to maxillary sinuses with palpation. ASSESSMENT   Diagnosis Orders   1. Acute bacterial sinusitis  azithromycin (ZITHROMAX) 250 MG tablet    benzonatate (TESSALON) 100 MG capsule      2. Cough            PLAN:  Acute bacterial sinusitis  Cough  - since pt is on day 5 and covid testing will take 3 days for result, will treat with azithromycin and tessalon perles. Pt was recommended to quaratine for another 24-48 hours and then wear mask for the following 5 days. - Continue social distancing and good hand washing.   - Tylenol/ibuprofen as needed  - OTC symptom relief  - Call office if any change or worsening in symptoms  - May return to work once quarantine complete and patient is without a fever or any fever reducing agents for 24 hours    To help relieve your symptoms, I suggest the following over-the-counter treatments:    For fevers or pain: acetaminophen (Tylenol) or ibuprofen (Advil, Motrin) or naproxen (Aleve)  For dry cough: medications containing dextromethorphan, such as Delsym, Robitussin DM or Mucinex DM and medicated throat lozenges  For congestion or sinus pressure: medications containing guaifenesin to help break up mucus, such as Mucinex or Robitussin, medications containing pseudoephedrine or phenylephrine, such as Sudafed, nasal steroid sprays, such as Flonase, Sensimist, Rhinocort or Nasonex and saline nasal sprays, neti pot or sinus rinse bottle  For runny nose, sneezing or watery/itchy eyes: less sedating antihistamines, such as loratidine (Claritin), fexofenadine (Allegra) or Cetirizine (Zyrtec)  For a combination of cold/flu symptoms: Allegra-D, Claritin-D or Zyrtec-D and multi-symptom cold/flu products, such as Dayquil, Nyquil, Theraflu and Annette-[place] Plus     If you have high blood pressure, you should avoid medications containing pseudoephedrine or phenylephrine, such as Sudafed. Running a humidifier in your bedroom may be helpful for many of your symptoms. If your cough is keeping you awake at night, you can try raising your head with an extra pillow. If the skin around your nose and lips becomes sore, you can put some petroleum jelly on the area. - Rest of systems unchanged, continue current treatments. - Medications, labs, diagnostic studies, consultations and follow-up as documented in this encounter. Rest of systems unchanged, continue current treatments    Antonio Ivy is a 77 y.o. female being evaluated by a Virtual Visit (video visit) encounter to address concerns as mentioned above. A caregiver was present when appropriate. Due to this being a TeleHealth encounter (During [facility]- public health emergency), evaluation of the following organ systems was limited: Vitals/Constitutional/EENT/Resp/CV/GI//MS/Neuro/Skin/Heme-Lymph-Imm.   Pursuant to the emergency declaration under the [address] and the [address] [facility] authority and the Coronavirus Preparedness and [employer] Act, this Virtual Visit was conducted with patient's (and/or legal guardian's) consent, to reduce the patient's risk of exposure to COVID-19 and provide necessary medical care. The patient (and/or legal guardian) has also been advised to contact this office for worsening conditions or problems, and seek emergency medical treatment and/or call 911 if deemed necessary. Patient identification was verified at the start of the visit: Yes    Total time spent on this encounter: Not billed by time    Services were provided through a video synchronous discussion virtually to substitute for in-person clinic visit. Patient and provider were located at their individual homes. --RAMON Palma CNP on 9/20/2022 at 7:28 AM    An electronic signature was used to authenticate this note.

## 2022-09-20 ENCOUNTER — TELEMEDICINE (OUTPATIENT)
Dept: FAMILY MEDICINE CLINIC | Age: 67
End: 2022-09-20
Payer: MEDICARE

## 2022-09-20 DIAGNOSIS — R05.9 COUGH: ICD-10-CM

## 2022-09-20 DIAGNOSIS — B96.89 ACUTE BACTERIAL SINUSITIS: Primary | ICD-10-CM

## 2022-09-20 DIAGNOSIS — J01.90 ACUTE BACTERIAL SINUSITIS: Primary | ICD-10-CM

## 2022-09-20 PROCEDURE — G8399 PT W/DXA RESULTS DOCUMENT: HCPCS | Performed by: NURSE PRACTITIONER

## 2022-09-20 PROCEDURE — 1090F PRES/ABSN URINE INCON ASSESS: CPT | Performed by: NURSE PRACTITIONER

## 2022-09-20 PROCEDURE — G8427 DOCREV CUR MEDS BY ELIG CLIN: HCPCS | Performed by: NURSE PRACTITIONER

## 2022-09-20 PROCEDURE — 1123F ACP DISCUSS/DSCN MKR DOCD: CPT | Performed by: NURSE PRACTITIONER

## 2022-09-20 PROCEDURE — 99213 OFFICE O/P EST LOW 20 MIN: CPT | Performed by: NURSE PRACTITIONER

## 2022-09-20 PROCEDURE — 3017F COLORECTAL CA SCREEN DOC REV: CPT | Performed by: NURSE PRACTITIONER

## 2022-09-20 RX ORDER — BENZONATATE 100 MG/1
100 CAPSULE ORAL 3 TIMES DAILY PRN
Qty: 30 CAPSULE | Refills: 0 | Status: SHIPPED | OUTPATIENT
Start: 2022-09-20 | End: 2022-09-30

## 2022-09-20 RX ORDER — AZITHROMYCIN 250 MG/1
250 TABLET, FILM COATED ORAL SEE ADMIN INSTRUCTIONS
Qty: 6 TABLET | Refills: 0 | Status: SHIPPED | OUTPATIENT
Start: 2022-09-20 | End: 2022-09-25

## 2022-09-20 ASSESSMENT — ENCOUNTER SYMPTOMS
CHEST TIGHTNESS: 1
BACK PAIN: 0
ABDOMINAL PAIN: 0
SHORTNESS OF BREATH: 1
CONSTIPATION: 0
NAUSEA: 0
RHINORRHEA: 1
SORE THROAT: 1
ABDOMINAL DISTENTION: 0
DIARRHEA: 0
COUGH: 1
COLOR CHANGE: 0

## 2022-09-23 DIAGNOSIS — J01.90 ACUTE BACTERIAL SINUSITIS: ICD-10-CM

## 2022-09-23 DIAGNOSIS — B96.89 ACUTE BACTERIAL SINUSITIS: ICD-10-CM

## 2022-09-26 RX ORDER — BENZONATATE 100 MG/1
CAPSULE ORAL
Qty: 30 CAPSULE | Refills: 0 | OUTPATIENT
Start: 2022-09-26

## 2022-09-26 RX ORDER — AZITHROMYCIN 250 MG/1
TABLET, FILM COATED ORAL
Qty: 6 TABLET | Refills: 0 | OUTPATIENT
Start: 2022-09-26

## 2022-10-06 DIAGNOSIS — J45.20 MILD INTERMITTENT ASTHMA WITHOUT COMPLICATION: ICD-10-CM

## 2022-10-06 DIAGNOSIS — M17.11 OSTEOARTHRITIS OF RIGHT KNEE, UNSPECIFIED OSTEOARTHRITIS TYPE: ICD-10-CM

## 2022-10-06 RX ORDER — GABAPENTIN 600 MG/1
1200 TABLET ORAL NIGHTLY
Qty: 90 TABLET | Refills: 1 | Status: SHIPPED | OUTPATIENT
Start: 2022-10-06 | End: 2023-01-04

## 2022-10-06 RX ORDER — OMEPRAZOLE 40 MG/1
40 CAPSULE, DELAYED RELEASE ORAL
Qty: 90 CAPSULE | Refills: 1 | Status: SHIPPED | OUTPATIENT
Start: 2022-10-06

## 2022-10-06 RX ORDER — MELOXICAM 15 MG/1
15 TABLET ORAL DAILY
Qty: 90 TABLET | Refills: 1 | Status: SHIPPED | OUTPATIENT
Start: 2022-10-06

## 2022-10-06 RX ORDER — MIRTAZAPINE 30 MG/1
30 TABLET, FILM COATED ORAL NIGHTLY
Qty: 90 TABLET | Refills: 1 | Status: SHIPPED | OUTPATIENT
Start: 2022-10-06

## 2022-10-06 RX ORDER — ROSUVASTATIN CALCIUM 10 MG/1
TABLET, COATED ORAL
Qty: 90 TABLET | Refills: 1 | Status: SHIPPED | OUTPATIENT
Start: 2022-10-06

## 2022-10-06 RX ORDER — MONTELUKAST SODIUM 10 MG/1
10 TABLET ORAL NIGHTLY
Qty: 90 TABLET | Refills: 3 | Status: SHIPPED | OUTPATIENT
Start: 2022-10-06

## 2022-10-06 RX ORDER — LOSARTAN POTASSIUM 25 MG/1
25 TABLET ORAL DAILY
Qty: 90 TABLET | Refills: 1 | Status: SHIPPED | OUTPATIENT
Start: 2022-10-06

## 2022-10-06 RX ORDER — ESCITALOPRAM OXALATE 5 MG/1
5 TABLET ORAL NIGHTLY
Qty: 90 TABLET | Refills: 1 | Status: SHIPPED | OUTPATIENT
Start: 2022-10-06

## 2022-11-29 LAB
AVERAGE GLUCOSE: NORMAL
HBA1C MFR BLD: 6.6 %

## 2022-12-01 ENCOUNTER — HOSPITAL ENCOUNTER (OUTPATIENT)
Dept: CT IMAGING | Age: 67
Discharge: HOME | End: 2022-12-03
Payer: MEDICARE

## 2022-12-01 ENCOUNTER — OFFICE VISIT (OUTPATIENT)
Dept: FAMILY MEDICINE CLINIC | Age: 67
End: 2022-12-01

## 2022-12-01 VITALS
SYSTOLIC BLOOD PRESSURE: 116 MMHG | WEIGHT: 161 LBS | DIASTOLIC BLOOD PRESSURE: 74 MMHG | BODY MASS INDEX: 26.79 KG/M2 | TEMPERATURE: 97.2 F | OXYGEN SATURATION: 95 % | HEART RATE: 83 BPM | RESPIRATION RATE: 16 BRPM

## 2022-12-01 DIAGNOSIS — R10.32 LLQ ABDOMINAL PAIN: Primary | ICD-10-CM

## 2022-12-01 DIAGNOSIS — B00.1 HERPES LABIALIS: ICD-10-CM

## 2022-12-01 DIAGNOSIS — N13.9 OBSTRUCTIVE UROPATHY: ICD-10-CM

## 2022-12-01 DIAGNOSIS — R10.32 LLQ ABDOMINAL PAIN: ICD-10-CM

## 2022-12-01 LAB
CREAT SERPL-MCNC: 0.75 MG/DL (ref 0.5–0.9)
GFR SERPL CREATININE-BSD FRML MDRD: >60 ML/MIN/1.73M2

## 2022-12-01 PROCEDURE — 36415 COLL VENOUS BLD VENIPUNCTURE: CPT

## 2022-12-01 PROCEDURE — G1010 CDSM STANSON: HCPCS

## 2022-12-01 PROCEDURE — 2580000003 HC RX 258: Performed by: NURSE PRACTITIONER

## 2022-12-01 PROCEDURE — 82565 ASSAY OF CREATININE: CPT

## 2022-12-01 PROCEDURE — 6360000004 HC RX CONTRAST MEDICATION: Performed by: NURSE PRACTITIONER

## 2022-12-01 RX ORDER — TAMSULOSIN HYDROCHLORIDE 0.4 MG/1
0.4 CAPSULE ORAL DAILY
Qty: 30 CAPSULE | Refills: 0 | Status: SHIPPED | OUTPATIENT
Start: 2022-12-01

## 2022-12-01 RX ORDER — 0.9 % SODIUM CHLORIDE 0.9 %
80 INTRAVENOUS SOLUTION INTRAVENOUS ONCE
Status: COMPLETED | OUTPATIENT
Start: 2022-12-01 | End: 2022-12-01

## 2022-12-01 RX ORDER — ACYCLOVIR 400 MG/1
400 TABLET ORAL 2 TIMES DAILY
Qty: 180 TABLET | Refills: 1 | Status: SHIPPED | OUTPATIENT
Start: 2022-12-01

## 2022-12-01 RX ORDER — SODIUM CHLORIDE 0.9 % (FLUSH) 0.9 %
10 SYRINGE (ML) INJECTION PRN
Status: DISCONTINUED | OUTPATIENT
Start: 2022-12-01 | End: 2022-12-04 | Stop reason: HOSPADM

## 2022-12-01 RX ADMIN — SODIUM CHLORIDE, PRESERVATIVE FREE 10 ML: 5 INJECTION INTRAVENOUS at 15:44

## 2022-12-01 RX ADMIN — SODIUM CHLORIDE 80 ML: 9 INJECTION, SOLUTION INTRAVENOUS at 15:44

## 2022-12-01 RX ADMIN — IOPAMIDOL 75 ML: 755 INJECTION, SOLUTION INTRAVENOUS at 15:44

## 2022-12-01 ASSESSMENT — ENCOUNTER SYMPTOMS
CHEST TIGHTNESS: 0
VOMITING: 1
ABDOMINAL PAIN: 1
SHORTNESS OF BREATH: 0
COUGH: 0
SORE THROAT: 0
DIARRHEA: 1
BACK PAIN: 0
CONSTIPATION: 0
RHINORRHEA: 0
ABDOMINAL DISTENTION: 0
COLOR CHANGE: 0
NAUSEA: 1

## 2022-12-01 ASSESSMENT — PATIENT HEALTH QUESTIONNAIRE - PHQ9
SUM OF ALL RESPONSES TO PHQ QUESTIONS 1-9: 0
2. FEELING DOWN, DEPRESSED OR HOPELESS: 0
1. LITTLE INTEREST OR PLEASURE IN DOING THINGS: 0
SUM OF ALL RESPONSES TO PHQ9 QUESTIONS 1 & 2: 0
SUM OF ALL RESPONSES TO PHQ QUESTIONS 1-9: 0

## 2022-12-01 NOTE — PROGRESS NOTES
Amilcar Louise, APRN-CNP  [address] [facility]  [id number], [address]  [address] [address]  Dept: [phone]  Dept Fax: [phone]     Patient ID: Gay Cogan is a 77 y.o. female Established patient    HPI    Pt here today for an acute visit secondary to abdominal pain that has been going on for about 2 months now but has been tolerable, pt states it started in the flank area and then came down around to her LLQ, she would get some nausea but then go away. Last night she ate and then had some nuts and she got excruciating pain to the LLQ with diarrhea and N/V. She has been able to keep some fluids down but nothing to eat. She had a semi-formed BM this morning. The pain is consistent. Denies any fever but did feel hot last night. - pt also states she continues to have cold sore outbreaks, she is currently on acyclovir for the out break but used to take it daily for preventative. Otherwise pt doing well on current tx and no other concerns today. The patient's past medical, surgical, social, and family history as well as his current medications and allergies were reviewed as documented in today's encounter SUSIE Felton. Previous office notes, labs, imaging and hospital records were reviewed prior to and during encounter. Current Outpatient Medications on File Prior to Visit   Medication Sig Dispense Refill    gabapentin (NEURONTIN) 600 MG tablet Take 2 tablets by mouth nightly for 90 days.  90 tablet 1    meloxicam (MOBIC) 15 MG tablet Take 1 tablet by mouth daily 90 tablet 1    escitalopram (LEXAPRO) 5 MG tablet Take 1 tablet by mouth nightly 90 tablet 1    losartan (COZAAR) 25 MG tablet Take 1 tablet by mouth daily 90 tablet 1    mirtazapine (REMERON) 30 MG tablet Take 1 tablet by mouth nightly 90 tablet 1    omeprazole (PRILOSEC) 40 MG delayed release capsule Take 1 capsule by mouth every morning (before breakfast) 1hr. Before eat breakfast 90 capsule 1    rosuvastatin (CRESTOR) 10 MG tablet rosuvastatin 10 mg tablet 90 tablet 1    montelukast (SINGULAIR) 10 MG tablet Take 1 tablet by mouth nightly 90 tablet 3    metFORMIN (GLUCOPHAGE) 1000 MG tablet Take 1 tablet by mouth 2 times daily (with meals) 60 tablet 1    Continuous Blood Gluc Sensor (FREESTYLE JUANI 2 SENSOR[place] [facility] [place], change every 14 days      triamcinolone (KENALOG) 0.1 % cream Apply topically 2 times daily. 453 g 0    ibuprofen (ADVIL;MOTRIN) 800 MG tablet Take 1 tablet by mouth 2 times daily as needed for Pain 60 tablet 1    fluticasone-salmeterol (ADVAIR DISKUS) 100-50 MCG/DOSE diskus inhaler Inhale 1 puff into the lungs 2 times daily 180 each 1    fluticasone (FLONASE) 50 MCG/ACT nasal spray 2 sprays by Nasal route daily 3 each 1    BD PEN NEEDLE WIN 2ND GEN 32G X 4 MM MISC       BASAGLAR KWIKPEN 100 UNIT/ML injection pen 22 Units nightly       NOVOLOG FLEXPEN 100 UNIT/ML injection pen Inject into the skin 3 times daily (before meals) Sliding sccale      aspirin 81 MG chewable tablet Take 81 mg by mouth daily      vitamin D 25 MCG (1000 UT) CAPS Take by mouth      GLUCOSAMINE HCL PO Take by mouth       No current facility-administered medications on file prior to visit. Subjective:     Review of Systems   Constitutional:  Negative for activity change, fatigue and fever. HENT:  Negative for congestion, ear pain, rhinorrhea and sore throat. Respiratory:  Negative for cough, chest tightness and shortness of breath. Cardiovascular:  Negative for chest pain and palpitations. Gastrointestinal:  Positive for abdominal pain (left side), diarrhea, nausea and vomiting (yesterday). Negative for abdominal distention and constipation. Endocrine: Negative for polydipsia, polyphagia and polyuria. Genitourinary:  Negative for difficulty urinating and dysuria. Musculoskeletal:  Negative for arthralgias, back pain and myalgias.    Skin:  Negative for color change and rash. Neurological:  Negative for dizziness, weakness, light-headedness and headaches. Hematological:  Negative for adenopathy. Psychiatric/Behavioral:  Negative for agitation and behavioral problems. The patient is not nervous/anxious. Vitals:    12/01/22 1331   BP: 116/74   Pulse: 83   Resp: 16   Temp: 97.2 °F (36.2 °C)   SpO2: 95%       Objective:     Physical Exam  Vitals reviewed. Constitutional:       General: She is not in acute distress. Appearance: Normal appearance. HENT:      Head: Normocephalic and atraumatic. Right Ear: External ear normal.      Left Ear: External ear normal.      Nose: Nose normal.      Mouth/Throat:      Mouth: Mucous membranes are moist.      Pharynx: No oropharyngeal exudate or posterior oropharyngeal erythema. Eyes:      Extraocular Movements: Extraocular movements intact. Conjunctiva/sclera: Conjunctivae normal.      Pupils: Pupils are equal, round, and reactive to light. Cardiovascular:      Rate and Rhythm: Normal rate and regular rhythm. Pulses: Normal pulses. Heart sounds: Normal heart sounds. No murmur heard. Pulmonary:      Effort: Pulmonary effort is normal. No respiratory distress. Breath sounds: Normal breath sounds. No wheezing or rales. Abdominal:      General: Bowel sounds are normal. There is no distension. Palpations: Abdomen is soft. Tenderness: There is abdominal tenderness in the left lower quadrant. There is guarding and rebound. There is no right CVA tenderness or left CVA tenderness. Negative signs include Kamara's sign, Rovsing's sign, McBurney's sign, psoas sign and obturator sign. Musculoskeletal:         General: Normal range of motion. Cervical back: Normal range of motion. Right lower leg: No edema. Left lower leg: No edema. Lymphadenopathy:      Cervical: No cervical adenopathy. Skin:     General: Skin is warm and dry.           Neurological:      General: No focal deficit present. Mental Status: She is alert and oriented to person, place, and time. Deep Tendon Reflexes: Reflexes normal.   Psychiatric:         Mood and Affect: Mood normal.         Behavior: Behavior normal. Behavior is cooperative. Assessment:      Diagnosis Orders   1. LLQ abdominal pain  CT ABDOMEN PELVIS W IV CONTRAST Additional Contrast? None      2. Herpes labialis  acyclovir (ZOVIRAX) 400 MG tablet          Plan:     LLQ abdominal pain  - due to symptoms worsening and length of time they have been ongoing will get Stat CT to rule out diverticulitis. Differentials: kidney stone, gastroenteritis. - will call with results. Herpes labialis  - continue acyclovir as prescribed. - start acyclovir twice daily. - abreva or campho phenique can be applied. - acyclovir (ZOVIRAX) 400 MG tablet; Take 1 tablet by mouth 2 times daily  Dispense: 180 tablet; Refill: 1    - call if symptoms worsen or do not improve. - pt verbalized understanding plan of care. Medications, labs, diagnostic studies, consultations and follow-up as documented in this encounter. Rest of systems unchanged, continue current treatments    On this date 12/1/2022 I have spent 30 minutes reviewing previous notes, test results and face to face with the patient discussing the diagnosis and importance of compliance with the treatment plan as well as documenting on the day of the visit.     RAMON Simmons-CNP

## 2022-12-12 ENCOUNTER — HOSPITAL ENCOUNTER (OUTPATIENT)
Dept: CT IMAGING | Age: 67
Discharge: HOME | End: 2022-12-14
Payer: MEDICARE

## 2022-12-12 DIAGNOSIS — N20.1 CALCULUS OF URETER: ICD-10-CM

## 2022-12-12 PROCEDURE — 74176 CT ABD & PELVIS W/O CONTRAST: CPT

## 2023-02-09 ENCOUNTER — OFFICE VISIT (OUTPATIENT)
Dept: DERMATOLOGY | Age: 68
End: 2023-02-09

## 2023-02-09 VITALS
HEART RATE: 81 BPM | HEIGHT: 65 IN | OXYGEN SATURATION: 97 % | WEIGHT: 163 LBS | SYSTOLIC BLOOD PRESSURE: 144 MMHG | TEMPERATURE: 97.4 F | BODY MASS INDEX: 27.16 KG/M2 | DIASTOLIC BLOOD PRESSURE: 82 MMHG

## 2023-02-09 DIAGNOSIS — D22.9 MULTIPLE NEVI: ICD-10-CM

## 2023-02-09 DIAGNOSIS — D48.5 NEOPLASM OF UNCERTAIN BEHAVIOR OF SKIN: ICD-10-CM

## 2023-02-09 DIAGNOSIS — Z80.8 FAMILY HISTORY OF MELANOMA: Primary | ICD-10-CM

## 2023-02-09 DIAGNOSIS — L82.1 SEBORRHEIC KERATOSES: ICD-10-CM

## 2023-02-09 DIAGNOSIS — L57.0 ACTINIC KERATOSES: ICD-10-CM

## 2023-02-09 DIAGNOSIS — L30.9 DERMATITIS: ICD-10-CM

## 2023-02-09 DIAGNOSIS — D18.01 HEMANGIOMA OF SKIN: ICD-10-CM

## 2023-02-09 RX ORDER — LIDOCAINE HYDROCHLORIDE 10 MG/ML
0.5 INJECTION, SOLUTION INFILTRATION; PERINEURAL ONCE
Status: SHIPPED | OUTPATIENT
Start: 2023-02-09

## 2023-02-09 NOTE — PROGRESS NOTES
Dermatology Patient Note  [address] [facility]  [address] [facility] [address]  [address]  Dept: [phone]  Dept Fax: [phone]      VISITDATE: 2/9/2023   REFERRING PROVIDER: No ref. provider found      Consuelo Dona is a 79 y.o. female  who presents today in the office for:    Follow-up (Pt here for FBSC- scaly patch on the left side of forehead that has been present for 6 months. /Fm hx of skin cancer-brother, sister. No personal. )      HISTORY OF PRESENT ILLNESS:  As above. Romel Erwin was last seen 2/9/22. At last visit, she had 1 AK frozen on her right cheek. Patient states lesion of concern on her right foot has not changed. She also states lesion on right chest has been dissipating. Romel Erwin has a family history of melanoma.     MEDICAL PROBLEMS:  Patient Active Problem List    Diagnosis Date Noted    Type I diabetes mellitus with complication ([facility] [address] 51/88/3972     Priority: Medium    Subluxation of tendon of long head of biceps 01/07/2022    Complete tear of right rotator cuff 01/07/2022    Right shoulder pain 11/29/2021    Right knee pain 11/29/2021    Atopic dermatitis 11/29/2021    Osteoarthritis of right knee 05/19/2021    Rotator cuff tendonitis, right 05/04/2021    Vitamin D deficiency 04/11/2021    Gastroesophageal reflux disease without esophagitis 04/11/2021    Hypertension 04/11/2021    On statin therapy 03/02/2021    Hypercholesterolemia 03/02/2021       CURRENT MEDICATIONS:   Current Outpatient Medications   Medication Sig Dispense Refill    acyclovir (ZOVIRAX) 400 MG tablet Take 1 tablet by mouth 2 times daily 180 tablet 1    meloxicam (MOBIC) 15 MG tablet Take 1 tablet by mouth daily 90 tablet 1    escitalopram (LEXAPRO) 5 MG tablet Take 1 tablet by mouth nightly 90 tablet 1    losartan (COZAAR) 25 MG tablet Take 1 tablet by mouth daily 90 tablet 1    mirtazapine (REMERON) 30 MG tablet Take 1 tablet by mouth nightly 90 tablet 1    omeprazole (PRILOSEC) 40 MG delayed release capsule Take 1 capsule by mouth every morning (before breakfast) 1hr. Before eat breakfast 90 capsule 1    rosuvastatin (CRESTOR) 10 MG tablet rosuvastatin 10 mg tablet 90 tablet 1    montelukast (SINGULAIR) 10 MG tablet Take 1 tablet by mouth nightly 90 tablet 3    metFORMIN (GLUCOPHAGE) 1000 MG tablet Take 1 tablet by mouth 2 times daily (with meals) 60 tablet 1    Continuous Blood Gluc Sensor (FREESTYLE JUANI 2 SENSOR) [facility] [place] As directed, change every 14 days      triamcinolone (KENALOG) 0.1 % cream Apply topically 2 times daily. 453 g 0    fluticasone-salmeterol (ADVAIR DISKUS) 100-50 MCG/DOSE diskus inhaler Inhale 1 puff into the lungs 2 times daily 180 each 1    fluticasone (FLONASE) 50 MCG/ACT nasal spray 2 sprays by Nasal route daily 3 each 1    BD PEN NEEDLE WIN 2ND GEN 32G X 4 MM MISC       BASAGLAR KWIKPEN 100 UNIT/ML injection pen 22 Units nightly       NOVOLOG FLEXPEN 100 UNIT/ML injection pen Inject into the skin 3 times daily (before meals) Sliding sccale      aspirin 81 MG chewable tablet Take 81 mg by mouth daily      vitamin D 25 MCG (1000 UT) CAPS Take by mouth      GLUCOSAMINE HCL PO Take by mouth      tamsulosin (FLOMAX) 0.4 MG capsule Take 1 capsule by mouth daily (Patient not taking: Reported on 2/9/2023) 30 capsule 0    gabapentin (NEURONTIN) 600 MG tablet Take 2 tablets by mouth nightly for 90 days. 90 tablet 1    ibuprofen (ADVIL;MOTRIN) 800 MG tablet Take 1 tablet by mouth 2 times daily as needed for Pain (Patient not taking: Reported on 2/9/2023) 60 tablet 1     No current facility-administered medications for this visit.        ALLERGIES:   Allergies   Allergen Reactions    Latex     Seasonal     Penicillins Rash       SOCIAL HISTORY:  Social History     Tobacco Use    Smoking status: Never    Smokeless tobacco: Never   Substance Use Topics    Alcohol use: No       Pertinent ROS:  Review of Systems  Skin: Denies any new changing, growing or bleeding lesions or rashes except as described in the HPI   Constitutional: Denies fevers, chills, and malaise. PHYSICAL EXAM:   BP (!) 144/82   Pulse 81   Temp 97.4 °F (36.3 °C)   Ht 5' 5\" (1.651 m)   Wt 163 lb (73.9 kg)   SpO2 97%   BMI 27.12 kg/m²     The patient is generally well appearing, well nourished, alert and conversational. Affect is normal.    Cutaneous Exam:  Physical Exam  Total body skin exam excluding external genitalia: head/face, neck, both arms, chest, back, abdomen, both legs, buttocks, digits and/or nails, was examined. Genital exam was deferred as patient denied having any lesions in this area. Complete visualization of scalp may be limited by hair density, length, and/or style    Facial covering was removed during examination. Diagnoses/exam findings/medical history pertinent to this visit are listed below:    Assessment:   Diagnosis Orders   1. Family history of melanoma        2. Dermatitis        3. Neoplasm of uncertain behavior of skin        4. Multiple nevi        5. Hemangioma of skin        6. Actinic keratoses        7. Seborrheic keratoses             Plan:  Actinic keratoses  Cryotherapy: After verbal consent was obtained including discussion of the risks (lesion persistence, lesion recurrence and hypo/hyperpigmentation) and benefits (resolution of the lesion), and alternative therapies, 1 total Actinic Keratosis on the forehead were treated with liquid nitrogen in a spray gun for a single 6 second freeze-thaw cycle for each lesion. The patient tolerated the procedure well and there were no immediate complications. Neoplasm uncertain behavior of skin x 2  Ddx:  Right dorsal third finger, lichen simplex chronicus r/o BCC  Right upper arm, r/o BCC  Shave Biopsy x 2: The procedure and its risks were explained including but not limited to pain, bleeding, infection, permanent scar, permanent pigment alteration and need for an additional procedure.  Consent to proceed with the procedure was obtained from the patient or the parent. After cleaning with alcohol the lesions were anesthetized with 1% lidocaine with epinephrine and removed with a dermablade. Hemostasis was achieved with aluminum chloride and Vaseline and a bandage were applied. Dermatitis of arms  - start triamcinolone for rash on arm under adhesive    Hemangioma of right foot  - reassurance and education  - keep monitoring    Seborrheic keratoses of back and scalp  - reassurance and education    Multiple nevi  - Clinically and dermatoscopically benign on exam today. - Common nevi have a low individual risk of developing into melanoma. Patients with >50 nevi have a greater risk of developing melanoma in their lifetime and should undergo skin checks at least annually. - I recommended the patient apply broad spectrum spf 30+ sunscreen daily, reapplying every 2 hours  - In additional to regular use of sunscreen, I recommended broad-rimmed hats, long sleeves, and seeking the shade. RTC 1 year    Future Appointments   Date Time Provider Cristiane Bo   2/17/2023  9:30 AM Edward Varela MD [facility]ZABETH [facility]         Patient Instructions   - Keep monitoring lesion on right foot  - I recommend Neutrogena [place] hand cream    Dermatitis of arms  - start triamcinolone for rash on arms    BIOPSY WOUND CARE    A biopsy is where a small piece of skin tissue is removed and examined by a pathologist.  When a biopsy is done, there is a small wound site that requires proper care to prevent infection and scarring. Some biopsies require sutures and their removal.    How to Care for Biopsy Wound    A.  Leave band-aid or dressing on for 24 hours. B. Wash two times a day with soap and water. C.  Let the wound air dry, then apply Vaseline ointment and cover with a Band-Aid       unless otherwise instructed by your provider. D.   If there is slight discomfort, you may give acetaminophen or ibuprofen. When To Call the Doctor    Call the Dermatology Clinic or your doctor if any of the following occur:    A. Redness and swelling  B. Tenderness and warm to touch  C.  Drainage from wound  D. Fever    Biopsy Results    Biopsy results are usually available in 1-2 weeks. We provide biopsy results in letters or via MyChart for benign results or we will call for any concerning results. If you have not heard from our staff please call the office within 2 weeks. Please call our office with any concerns at [phone]. Cryotherapy    Liquid Nitrogen - \"freeze\" (Cryotherapy)  Your doctor has treated your skin lesions with a very cold substance. The liquid nitrogen is so cold that it may feel like the skin is burning during application. A clear blister or blood blister may form after treatment and may later form a scab. Leave the area alone. Usually this scab will fall of within 1-2 weeks. The area should be kept clean and can be covered with Vaseline and a Band-Aid if needed. If a large blister develops it is ok to use a clean needle to gently pop the blister. Please call our office with any concerns at [phone]. Actinic Keratosis (AKs)   Actinic Keratosis are skin lesions caused by long-term exposure to the sun. They are scaly, rough, to the touch, irregularly shaped, and skin-colored, reddish brown, or yellowish in color. Recent Studies suggest that some AK lesions actually may be a very early form of a type of skin cancer, squamous cell carcinoma (SCC), that has not spread beyond a small, confined area. It is not yet possible to tell which AK lesions will go on to become skin cancer. Experts from the [employer], the [address] [place] [address], and the [facility] of Dermatology have recommended that all patients with AK lesions be evaluated and undergo some form of treatment.  Your dermatologist can determine which type of treatment-either alone or in combination-is right for you. SUN PROTECTION AND OBSERVATION  Your dermatologist may recommend that you use a sun block, wear a hat and clothing to prevent sun exposure, and have regular skin examinations. Some AKs go away without further treatment, provided that the skin is not subjected to more sun damage. However, regular examinations will help catch the lesions that need to be treated. LESION-TARGETED THERAPIES   Liquid nitrogen (cryotherapy) destroys AKs by freezing them. This results in blistering and shedding of the AKs. Cryotherapy is the most common treatment when a patient has a few, small AK lesions. Topical chemotherapy is a cream that targets sun-damaged and pre-cancerous cells and destroys them. Seborrheic Keratosis  Seborrheic keratoses are common benign growths of unknown cause seen in adults due to a thickening of an area of the top skin layer. Who's At Risk  Although they can occur anytime after puberty, almost everyone over 48 has one or more of these and they increase in number with age. Some families have an inherited tendency to grow multiple lesions. Men and women are equally as likely to develop seborrheic keratoses. Dark-skinned people are less affected than those with light skin; a variant seen in blacks is called dermatosis papulosa nigra. Signs & Symptoms  One or more spots can occur anywhere on the body, except for palms, soles, and mucous membranes (eg, in the mouth or rectum). They do not go away. They do not turn into cancers, but some cancers resemble seborrheic keratosis. They start as light brown to skin-colored, flat areas, which are round to oval and of varying size (usually less than a half inch, but sometimes much larger). As they grow thicker and rise above the skin surface, seborrheic keratoses may become dark brown to almost black with a \"stuck on\" appearance. The surface may feel smooth or rough.   Self-Care Guidelines  No treatment is needed unless there is irritation from clothing with itching or bleeding. There is no way to prevent new spots from forming. Some lotions with alpha hydroxyl acids may make the areas feel smoother with regular use but will not eliminate them. OTC freezing techniques are available but usually not effective. When to [facility]  If a spot on the skin is growing, bleeding, painful, or itchy, or any other concerning changes, then see your doctor. Moles    Moles, or nevi, are very common. Moles are areas of the skin where there are more cells called melanocytes. Melanocytes are the cells in the body that produce pigment, or color. Moles can be many colors including skin-tone, pink, tan, brown, and very dark brown to black. Moles can be raised or flat. Moles can have hair. Moles can grow on any skin surface, including the scalp, hands and feet. When someone is born with a mole, or develops one in the first months of life, the mole is called a congenital, or birthmark mole. About 1 in 100 people are born with one or more moles. Most people develop their moles later in childhood or adulthood. These are called acquired moles. They are most common on sun exposed areas of skin such as the face, neck, upper body, arms and legs. CHECKING MOLES  Most moles are harmless, but in rare cases moles may become cancerous. Checking moles and looking for changes is an important step in helping to catch worrisome changes early. Some changes to look for are asymmetry (moles that do not look the same on each half), irregular shapes or borders, uneven color or large size. Also look for any moles that bleed, itch, or become painful. Looking at your skin regularly can help you recognize moles that are more at risk for becoming cancerous. WHEN TO CALL THE DOCTOR  Call your doctor if you see any of the following changes in a mole:       Irregular borders (uneven shape or edges)       Changes in color to black, blue or red.        Changes in the surface texture Scabs, scaling, irritation or bleeding in the mole    TREATMENT FOR MOLES  Often we can simply look at your moles and tell you if they look worrisome. If we are not concerned about the look of your moles at your appointment, we may measure some moles and take some photos that will allow us to watch for future changes in the moles. TREATMENT FOR MOLES  If a mole is getting irritated frequently, bleeding, difficult to watch due to location or dark color, atypical in appearance, or worrisome, we may perform a skin biopsy. A skin biopsy is a procedure that involves removing the mole so that it can be looked at under a microscope. There are many methods used to remove moles. The method we choose depends on the location of the mole, the size of the mole, and the amount of concern for skin cancer. Generally, removing moles in the dermatologists office is a simple and safe procedure that can be done with local anesthesia. PREVENTION  You can do some things to prevent moles from becoming cancerous:       Try to avoid long periods of time in the sun and severe sunburns. The sun is        especially dangerous between 10:00 am and 4:00 pm.       Use a broad spectrum, water-resistant sun block lotion with an SPF of 30 or        greater. A broad spectrum lotion blocks both UVA and UVB rays from the sun. Re-apply sunscreen at least every 2 hours and after swimming or sweating. Take advantage of shade whenever possible. Wear a broad-brimmed hat,        sunglasses, and protective clothing when outdoors. Do not use tanning beds. Sun Protection     There are two types of sun rays that are harmful to the skin. UVA rays cause skin aging and skin cancer, such as melanoma. UVB rays cause sunburns, cataracts, and also contribute to skin cancer.     The American-[employer]y of Dermat[employer] recommends that children and adults wear a broad spectrum, waterproof sunscreen with a Sun Protection Factor (SPF) of 30 or higher. It is important to check the ingredient label to be sure the sunscreen will protect the skin from both UVA and UVB sunrays. Your sunscreen should contain at least one of the following ingredients: titanium dioxide, zinc oxide, or avobenzone. Sunscreen will not be effective unless it is applied to all exposed skin. Sunscreens work best if they are applied 30 minutes before sun exposure. They should be reapplied every 2 hours and after any water exposure. Sunscreen is not perfect. It is important to use other methods to protect the skin from sun exposure also. Wear hats, sunglasses and other sun protective clothing when outdoors. Stay in the shade during the peak hours of sun exposure between 10 AM and 4 PM.      I, Zaina Hurtado, personally scribed the services dictated to me by Dr. Reji Melendez in this documentation. I, Dr. Reji Melendez, personally performed the services described in this documentation, as scribed by [facility] in my presence, and it is both accurate and complete.     Electronically signed by Kamran Seaman MD on 2/9/2023 at 9:21 AM

## 2023-02-09 NOTE — PATIENT INSTRUCTIONS
- Keep monitoring lesion on right foot  - I recommend Neutrogena [place] hand cream    Dermatitis of arms  - start triamcinolone for rash on arms    BIOPSY WOUND CARE    A biopsy is where a small piece of skin tissue is removed and examined by a pathologist.  When a biopsy is done, there is a small wound site that requires proper care to prevent infection and scarring. Some biopsies require sutures and their removal.    How to Care for Biopsy Wound    A.  Leave band-aid or dressing on for 24 hours. B. Wash two times a day with soap and water. C.  Let the wound air dry, then apply Vaseline ointment and cover with a Band-Aid       unless otherwise instructed by your provider. D. If there is slight discomfort, you may give acetaminophen or ibuprofen. When To Call the Doctor    Call the Dermatology Clinic or your doctor if any of the following occur:    A. Redness and swelling  B. Tenderness and warm to touch  C.  Drainage from wound  D. Fever    Biopsy Results    Biopsy results are usually available in 1-2 weeks. We provide biopsy results in letters or via [facility] for benign results or we will call for any concerning results. If you have not heard from our staff please call the office within 2 weeks. Please call our office with any concerns at [phone]. Cryotherapy    Liquid Nitrogen - \"freeze\" (Cryotherapy)  Your doctor has treated your skin lesions with a very cold substance. The liquid nitrogen is so cold that it may feel like the skin is burning during application. A clear blister or blood blister may form after treatment and may later form a scab. Leave the area alone. Usually this scab will fall of within 1-2 weeks. The area should be kept clean and can be covered with Vaseline and a Band-Aid if needed. If a large blister develops it is ok to use a clean needle to gently pop the blister. Please call our office with any concerns at [phone].     Actinic Keratosis (AKs)   Actinic Keratosis are skin lesions caused by long-term exposure to the sun. They are scaly, rough, to the touch, irregularly shaped, and skin-colored, reddish brown, or yellowish in color. Recent Studies suggest that some AK lesions actually may be a very early form of a type of skin cancer, squamous cell carcinoma (SCC), that has not spread beyond a small, confined area. It is not yet possible to tell which AK lesions will go on to become skin cancer. Experts from the [address] [place] [address], the [address] [place] [address], and the [facility] have recommended that all patients with AK lesions be evaluated and undergo some form of treatment. Your dermatologist can determine which type of treatment-either alone or in combination-is right for you. SUN PROTECTION AND OBSERVATION  Your dermatologist may recommend that you use a sun block, wear a hat and clothing to prevent sun exposure, and have regular skin examinations. Some AKs go away without further treatment, provided that the skin is not subjected to more sun damage. However, regular examinations will help catch the lesions that need to be treated. LESION-TARGETED THERAPIES   Liquid nitrogen (cryotherapy) destroys AKs by freezing them. This results in blistering and shedding of the AKs. Cryotherapy is the most common treatment when a patient has a few, small AK lesions. Topical chemotherapy is a cream that targets sun-damaged and pre-cancerous cells and destroys them. Seborrheic Keratosis  Seborrheic keratoses are common benign growths of unknown cause seen in adults due to a thickening of an area of the top skin layer. Who's At Risk  Although they can occur anytime after puberty, almost everyone over 48 has one or more of these and they increase in number with age. Some families have an inherited tendency to grow multiple lesions. Men and women are equally as likely to develop seborrheic keratoses.  Dark-skinned people are less affected than those with light skin; a variant seen in blacks is called dermatosis papulosa nigra. Signs & Symptoms  One or more spots can occur anywhere on the body, except for palms, soles, and mucous membranes (eg, in the mouth or rectum). They do not go away. They do not turn into cancers, but some cancers resemble seborrheic keratosis. They start as light brown to skin-colored, flat areas, which are round to oval and of varying size (usually less than a half inch, but sometimes much larger). As they grow thicker and rise above the skin surface, seborrheic keratoses may become dark brown to almost black with a \"stuck on\" appearance. The surface may feel smooth or rough. Self-Care Guidelines  No treatment is needed unless there is irritation from clothing with itching or bleeding. There is no way to prevent new spots from forming. Some lotions with alpha hydroxyl acids may make the areas feel smoother with regular use but will not eliminate them. OTC freezing techniques are available but usually not effective. When to [facility]  If a spot on the skin is growing, bleeding, painful, or itchy, or any other concerning changes, then see your doctor. Moles    Moles, or nevi, are very common. Moles are areas of the skin where there are more cells called melanocytes. Melanocytes are the cells in the body that produce pigment, or color. Moles can be many colors including skin-tone, pink, tan, brown, and very dark brown to black. Moles can be raised or flat. Moles can have hair. Moles can grow on any skin surface, including the scalp, hands and feet. When someone is born with a mole, or develops one in the first months of life, the mole is called a congenital, or birthmark mole. About 1 in 100 people are born with one or more moles. Most people develop their moles later in childhood or adulthood. These are called acquired moles.  They are most common on sun exposed areas of skin such as the face, neck, upper body, arms and legs. CHECKING MOLES  Most moles are harmless, but in rare cases moles may become cancerous. Checking moles and looking for changes is an important step in helping to catch worrisome changes early. Some changes to look for are asymmetry (moles that do not look the same on each half), irregular shapes or borders, uneven color or large size. Also look for any moles that bleed, itch, or become painful. Looking at your skin regularly can help you recognize moles that are more at risk for becoming cancerous. WHEN TO CALL THE DOCTOR  Call your doctor if you see any of the following changes in a mole:       Irregular borders (uneven shape or edges)       Changes in color to black, blue or red. Changes in the surface texture       Scabs, scaling, irritation or bleeding in the mole    TREATMENT FOR MOLES  Often we can simply look at your moles and tell you if they look worrisome. If we are not concerned about the look of your moles at your appointment, we may measure some moles and take some photos that will allow us to watch for future changes in the moles. TREATMENT FOR MOLES  If a mole is getting irritated frequently, bleeding, difficult to watch due to location or dark color, atypical in appearance, or worrisome, we may perform a skin biopsy. A skin biopsy is a procedure that involves removing the mole so that it can be looked at under a microscope. There are many methods used to remove moles. The method we choose depends on the location of the mole, the size of the mole, and the amount of concern for skin cancer. Generally, removing moles in the dermatologists office is a simple and safe procedure that can be done with local anesthesia. PREVENTION  You can do some things to prevent moles from becoming cancerous:       Try to avoid long periods of time in the sun and severe sunburns.  The sun is        especially dangerous between 10:00 am and 4:00 pm.       Use a broad spectrum, water-resistant sun block lotion with an SPF of 30 or        greater. A broad spectrum lotion blocks both UVA and UVB rays from the sun. Re-apply sunscreen at least every 2 hours and after swimming or sweating. Take advantage of shade whenever possible. Wear a broad-brimmed hat,        sunglasses, and protective clothing when outdoors. Do not use tanning beds. Sun Protection     There are two types of sun rays that are harmful to the skin. UVA rays cause skin aging and skin cancer, such as melanoma. UVB rays cause sunburns, cataracts, and also contribute to skin cancer. The American-[employer]y [employer] Dermat[employer] recommends that children and adults wear a broad spectrum, waterproof sunscreen with a Sun Protection Factor (SPF) of 30 or higher. It is important to check the ingredient label to be sure the sunscreen will protect the skin from both UVA and UVB sunrays. Your sunscreen should contain at least one of the following ingredients: titanium dioxide, zinc oxide, or avobenzone. Sunscreen will not be effective unless it is applied to all exposed skin. Sunscreens work best if they are applied 30 minutes before sun exposure. They should be reapplied every 2 hours and after any water exposure. Sunscreen is not perfect. It is important to use other methods to protect the skin from sun exposure also. Wear hats, sunglasses and other sun protective clothing when outdoors.   Stay in the shade during the peak hours of sun exposure between 10 AM and 4 PM.

## 2023-02-13 ENCOUNTER — NURSE ONLY (OUTPATIENT)
Dept: LAB | Age: 68
End: 2023-02-13

## 2023-02-15 NOTE — RESULT ENCOUNTER NOTE
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 No further treatment of this lesion is needed at this time.

## 2023-02-17 ENCOUNTER — OFFICE VISIT (OUTPATIENT)
Dept: ORTHOPEDIC SURGERY | Age: 68
End: 2023-02-17

## 2023-02-17 VITALS — RESPIRATION RATE: 16 BRPM | WEIGHT: 163 LBS | HEIGHT: 63 IN | BODY MASS INDEX: 28.88 KG/M2

## 2023-02-17 DIAGNOSIS — Z98.890 STATUS POST ROTATOR CUFF REPAIR: Primary | ICD-10-CM

## 2023-02-17 NOTE — PROGRESS NOTES
Procedure: Right shoulder arthroscopic rotator cuff repair  Date of procedure: 2/22/22    HPI: Filemon Marie is a 79 y.o. female who is approximately 1 year status post the aforementioned procedure. She indicates that she is is still dealing with some pain in the shoulder and experiences some clicking in the shoulder whenever she raises this arm. Her pain is primarily localized to the lateral aspect of the shoulder and is present with use. By the end of the day she states that she is pretty sore and has to ice her shoulder. She is retired but takes care of her grandkids and remains very active. She states that she really has not kept up with her home exercise program.    Physical examination:  Resp 16   Ht 5' 3\" (1.6 m)   Wt 163 lb (73.9 kg)   BMI 28.87 kg/m²   Evaluation of patient's right shoulder and upper extremity demonstrates her incisions to be healed appropriately. There is minimal to no swelling at this time. Sensation is grossly intact light touch in all dermatomes and she has a 2+ radial pulse. ROM: (Degrees)    Right   A P   Left   A P    Elevation  150    Elevation  150   Abduction  155    Abduction  160    ER   45    ER   75   IR   T12    IR   T10   90 abd/ER      90 abd/ER     90 abd/IR      90 abd/IR     Crepitation  No    Crepitation  No      Muscle strength:    Right       Left    Deltoid   5    Deltoid   5  Supraspinatus  3    Supraspinatus  5  ER   4    ER   5  IR   5    IR   5    Special tests    Right   Rotator Cuff    Left    n   Painful arc    n   n   Pain with ER    n    n   Neer's     n    n   Hawkin's    n    n   Drop Arm    n  n   Lift off/Belly Press   n  n   ER Lag    n      Impression and plan:Marianna Fraser is a 79 y.o. female  who is approximately 1 year status post left shoulder arthroscopic rotator cuff repair. She continues to deal with some residual pain and weakness in the shoulder. I had a discussion with the patient about possible reasons for this.   I suspect that her rotator cuff tear did not heal.  We had a discussion about this and discussed treatment options moving forward including conservative management or even the possibility of surgical intervention. At this time she is not looking to pursue anything formally. Consequently I did recommend that she resume her home exercise program and may continue to use anti-inflammatories as needed. She can continue on with her activities as she feels comfortable and I will see her back in my clinic as needed but she may return or call at anytime with persistent or worsening symptoms and with any questions or concerns.

## 2023-02-21 DIAGNOSIS — E10.8 TYPE I DIABETES MELLITUS WITH COMPLICATION (HCC): Primary | ICD-10-CM

## 2023-02-21 DIAGNOSIS — N13.9 OBSTRUCTIVE UROPATHY: ICD-10-CM

## 2023-02-21 DIAGNOSIS — E78.00 HYPERCHOLESTEROLEMIA: ICD-10-CM

## 2023-02-21 RX ORDER — ROSUVASTATIN CALCIUM 10 MG/1
TABLET, COATED ORAL
Qty: 90 TABLET | Refills: 1 | Status: SHIPPED | OUTPATIENT
Start: 2023-02-21

## 2023-02-21 RX ORDER — TAMSULOSIN HYDROCHLORIDE 0.4 MG/1
CAPSULE ORAL
Qty: 90 CAPSULE | Refills: 1 | Status: SHIPPED | OUTPATIENT
Start: 2023-02-21

## 2023-02-21 NOTE — TELEPHONE ENCOUNTER
Called pt and scheduled follow up. Pt states that she did have hgbA1c checked by endocrinologist a couple weeks ago and it was 6.9. Pt wondering if any other labs need to be completed.

## 2023-02-21 NOTE — TELEPHONE ENCOUNTER
Can we call and get a1c from endo and let pt know yes she is due for her yearly labs to check blood count, kidney and liver function, cholesterol and vit d

## 2023-02-22 ENCOUNTER — HOSPITAL ENCOUNTER (OUTPATIENT)
Age: 68
Setting detail: SPECIMEN
Discharge: HOME | End: 2023-02-22

## 2023-02-22 DIAGNOSIS — E10.8 TYPE I DIABETES MELLITUS WITH COMPLICATION (HCC): ICD-10-CM

## 2023-02-22 DIAGNOSIS — E78.00 HYPERCHOLESTEROLEMIA: ICD-10-CM

## 2023-02-22 DIAGNOSIS — Z00.00 PREVENTATIVE HEALTH CARE: ICD-10-CM

## 2023-02-22 DIAGNOSIS — E55.9 VITAMIN D DEFICIENCY: ICD-10-CM

## 2023-02-22 LAB
25(OH)D3 SERPL-MCNC: 77.1 NG/ML
ALBUMIN SERPL-MCNC: 4.5 G/DL (ref 3.5–5.2)
ALBUMIN/GLOBULIN RATIO: 1.7 (ref 1–2.5)
ALP SERPL-CCNC: 101 U/L (ref 35–104)
ALT SERPL-CCNC: 33 U/L (ref 5–33)
ANION GAP SERPL CALCULATED.3IONS-SCNC: 11 MMOL/L (ref 9–17)
AST SERPL-CCNC: 33 U/L
BILIRUB SERPL-MCNC: 0.4 MG/DL (ref 0.3–1.2)
BUN SERPL-MCNC: 16 MG/DL (ref 8–23)
CALCIUM SERPL-MCNC: 9.9 MG/DL (ref 8.6–10.4)
CHLORIDE SERPL-SCNC: 103 MMOL/L (ref 98–107)
CHOLEST SERPL-MCNC: 177 MG/DL
CHOLESTEROL/HDL RATIO: 3.4
CO2 SERPL-SCNC: 24 MMOL/L (ref 20–31)
CREAT SERPL-MCNC: 0.72 MG/DL (ref 0.5–0.9)
CREATININE URINE: 88 MG/DL (ref 28–217)
GFR SERPL CREATININE-BSD FRML MDRD: >60 ML/MIN/1.73M2
GLUCOSE SERPL-MCNC: 110 MG/DL (ref 70–99)
HCT VFR BLD AUTO: 41.3 % (ref 36.3–47.1)
HDLC SERPL-MCNC: 52 MG/DL
HGB BLD-MCNC: 13.7 G/DL (ref 11.9–15.1)
LDLC SERPL CALC-MCNC: 88 MG/DL (ref 0–130)
MCH RBC QN AUTO: 30.2 PG (ref 25.2–33.5)
MCHC RBC AUTO-ENTMCNC: 33.2 G/DL (ref 28.4–34.8)
MCV RBC AUTO: 91.2 FL (ref 82.6–102.9)
MICROALBUMIN/CREAT 24H UR: 47 MG/L
MICROALBUMIN/CREAT UR-RTO: 53 MCG/MG CREAT
NRBC AUTOMATED: 0 PER 100 WBC
PDW BLD-RTO: 12.8 % (ref 11.8–14.4)
PLATELET # BLD AUTO: 275 K/UL (ref 138–453)
PMV BLD AUTO: 10.6 FL (ref 8.1–13.5)
POTASSIUM SERPL-SCNC: 4.7 MMOL/L (ref 3.7–5.3)
PROT SERPL-MCNC: 7.2 G/DL (ref 6.4–8.3)
RBC # BLD: 4.53 M/UL (ref 3.95–5.11)
SODIUM SERPL-SCNC: 138 MMOL/L (ref 135–144)
TRIGL SERPL-MCNC: 186 MG/DL
WBC # BLD AUTO: 5.2 K/UL (ref 3.5–11.3)

## 2023-02-27 ENCOUNTER — OFFICE VISIT (OUTPATIENT)
Dept: FAMILY MEDICINE CLINIC | Age: 68
End: 2023-02-27
Payer: MEDICARE

## 2023-02-27 VITALS
DIASTOLIC BLOOD PRESSURE: 80 MMHG | WEIGHT: 164 LBS | BODY MASS INDEX: 29.05 KG/M2 | TEMPERATURE: 97.1 F | SYSTOLIC BLOOD PRESSURE: 126 MMHG | OXYGEN SATURATION: 97 % | RESPIRATION RATE: 16 BRPM | HEART RATE: 84 BPM

## 2023-02-27 DIAGNOSIS — H61.22 LEFT EAR IMPACTED CERUMEN: ICD-10-CM

## 2023-02-27 DIAGNOSIS — E78.00 HYPERCHOLESTEROLEMIA: ICD-10-CM

## 2023-02-27 DIAGNOSIS — J02.9 SORE THROAT: ICD-10-CM

## 2023-02-27 DIAGNOSIS — I10 HYPERTENSION, UNSPECIFIED TYPE: ICD-10-CM

## 2023-02-27 DIAGNOSIS — R19.7 DIARRHEA, UNSPECIFIED TYPE: ICD-10-CM

## 2023-02-27 DIAGNOSIS — M54.41 ACUTE RIGHT-SIDED LOW BACK PAIN WITH RIGHT-SIDED SCIATICA: ICD-10-CM

## 2023-02-27 DIAGNOSIS — E10.8 TYPE I DIABETES MELLITUS WITH COMPLICATION (HCC): ICD-10-CM

## 2023-02-27 DIAGNOSIS — J45.20 MILD INTERMITTENT ASTHMA WITHOUT COMPLICATION: ICD-10-CM

## 2023-02-27 DIAGNOSIS — K21.9 GASTROESOPHAGEAL REFLUX DISEASE WITHOUT ESOPHAGITIS: Primary | ICD-10-CM

## 2023-02-27 DIAGNOSIS — E55.9 VITAMIN D DEFICIENCY: ICD-10-CM

## 2023-02-27 LAB — S PYO AG THROAT QL: NORMAL

## 2023-02-27 PROCEDURE — 3046F HEMOGLOBIN A1C LEVEL >9.0%: CPT | Performed by: NURSE PRACTITIONER

## 2023-02-27 PROCEDURE — G8399 PT W/DXA RESULTS DOCUMENT: HCPCS | Performed by: NURSE PRACTITIONER

## 2023-02-27 PROCEDURE — 69210 REMOVE IMPACTED EAR WAX UNI: CPT | Performed by: NURSE PRACTITIONER

## 2023-02-27 PROCEDURE — 87880 STREP A ASSAY W/OPTIC: CPT | Performed by: NURSE PRACTITIONER

## 2023-02-27 PROCEDURE — 1123F ACP DISCUSS/DSCN MKR DOCD: CPT | Performed by: NURSE PRACTITIONER

## 2023-02-27 PROCEDURE — 1090F PRES/ABSN URINE INCON ASSESS: CPT | Performed by: NURSE PRACTITIONER

## 2023-02-27 PROCEDURE — G8427 DOCREV CUR MEDS BY ELIG CLIN: HCPCS | Performed by: NURSE PRACTITIONER

## 2023-02-27 PROCEDURE — 99215 OFFICE O/P EST HI 40 MIN: CPT | Performed by: NURSE PRACTITIONER

## 2023-02-27 PROCEDURE — G8484 FLU IMMUNIZE NO ADMIN: HCPCS | Performed by: NURSE PRACTITIONER

## 2023-02-27 PROCEDURE — 3017F COLORECTAL CA SCREEN DOC REV: CPT | Performed by: NURSE PRACTITIONER

## 2023-02-27 PROCEDURE — 2022F DILAT RTA XM EVC RTNOPTHY: CPT | Performed by: NURSE PRACTITIONER

## 2023-02-27 PROCEDURE — 1036F TOBACCO NON-USER: CPT | Performed by: NURSE PRACTITIONER

## 2023-02-27 PROCEDURE — 3079F DIAST BP 80-89 MM HG: CPT | Performed by: NURSE PRACTITIONER

## 2023-02-27 PROCEDURE — G8417 CALC BMI ABV UP PARAM F/U: HCPCS | Performed by: NURSE PRACTITIONER

## 2023-02-27 PROCEDURE — 3074F SYST BP LT 130 MM HG: CPT | Performed by: NURSE PRACTITIONER

## 2023-02-27 RX ORDER — AZITHROMYCIN 250 MG/1
250 TABLET, FILM COATED ORAL SEE ADMIN INSTRUCTIONS
Qty: 6 TABLET | Refills: 0 | Status: CANCELLED | OUTPATIENT
Start: 2023-02-27 | End: 2023-03-04

## 2023-02-27 RX ORDER — AZITHROMYCIN 250 MG/1
250 TABLET, FILM COATED ORAL SEE ADMIN INSTRUCTIONS
Qty: 6 TABLET | Refills: 0 | Status: SHIPPED | OUTPATIENT
Start: 2023-02-27 | End: 2023-03-04

## 2023-02-27 SDOH — ECONOMIC STABILITY: HOUSING INSECURITY
IN THE LAST 12 MONTHS, WAS THERE A TIME WHEN YOU DID NOT HAVE A STEADY PLACE TO SLEEP OR SLEPT IN A SHELTER (INCLUDING NOW)?: NO

## 2023-02-27 SDOH — ECONOMIC STABILITY: FOOD INSECURITY: WITHIN THE PAST 12 MONTHS, THE FOOD YOU BOUGHT JUST DIDN'T LAST AND YOU DIDN'T HAVE MONEY TO GET MORE.: NEVER TRUE

## 2023-02-27 SDOH — ECONOMIC STABILITY: INCOME INSECURITY: HOW HARD IS IT FOR YOU TO PAY FOR THE VERY BASICS LIKE FOOD, HOUSING, MEDICAL CARE, AND HEATING?: NOT HARD AT ALL

## 2023-02-27 SDOH — ECONOMIC STABILITY: FOOD INSECURITY: WITHIN THE PAST 12 MONTHS, YOU WORRIED THAT YOUR FOOD WOULD RUN OUT BEFORE YOU GOT MONEY TO BUY MORE.: NEVER TRUE

## 2023-02-27 ASSESSMENT — ENCOUNTER SYMPTOMS
NAUSEA: 0
RHINORRHEA: 0
ABDOMINAL DISTENTION: 0
CONSTIPATION: 0
SORE THROAT: 1
COUGH: 1
DIARRHEA: 1
SHORTNESS OF BREATH: 0
BACK PAIN: 0
COLOR CHANGE: 0
CHEST TIGHTNESS: 0
ABDOMINAL PAIN: 0

## 2023-02-27 NOTE — PATIENT INSTRUCTIONS
Patient Education        Learning About the Low FODMAP Diet for Irritable Bowel Syndrome (IBS)  What is the low-FODMAP diet? A low-FODMAP diet is used to find out if certain foods make irritable bowel syndrome (IBS) worse. You stop eating high-FODMAP foods for 2 to 6 weeks. Then you slowly add them back to see how your body reacts. This is called an elimination diet. A dietitian or doctor can help you follow this diet. FODMAPs are types of carbohydrates that can be hard for your body to digest. They are in many types of foods. FODMAP stands for:  F ermentable. O ligosaccharides. D isaccharides. M onosaccharides. A nd p olyols. If you have IBS, foods that are high in FODMAPs may make your symptoms worse. When you are on this diet, you can still eat carbohydrates that are low in FODMAPs. This includes certain fruits, vegetables, grains, and low-lactose dairy products. What is it used for? This diet is used to help manage symptoms of irritable bowel syndrome (IBS). The diet limits foods that are high in FODMAPs. High-FODMAP foods can be hard to digest. They pull more fluid into your intestines. They are also easily fermented. This can lead to bloating, belly pain, gas, and diarrhea. The low-FODMAP diet can help you figure out what foods to avoid. And it can help you find foods that are easier to digest.  This diet can help with IBS symptoms. But it's not a cure. You will still need to manage your condition. How does it work? At first, you won't eat any high-FODMAP foods for a few weeks. It can be helpful to work with a dietitian who is trained in the [address] when you try this diet. They can help you find recipes and FODMAP food lists to use while you are on the diet. After 2 to 6 weeks, you will start to try high-FODMAP foods again. You will add those foods back to your diet, one at a time. Your doctor or dietitian will probably have you wait a few days before you add each new food.   Keep a food diary. You can write down the foods you try and note how they make you feel. After a few weeks, you may have a better idea of what foods you should avoid and what foods you can eat without triggering IBS symptoms. What are the risks? There is some risk of not getting all of the vitamins and nutrients you need on the low-FODMAP diet. These include: Folate. Thiamin. Vitamin B6. Calcium. Vitamin D. Your dietitian or doctor can help you find other sources of these if needed. This diet may limit your fiber intake. If you need more fiber, ask your doctor or dietitian about low-FODMAP fiber sources. What foods are on the low-FODMAP diet? Here is a guide to foods that you can eat, plus the foods that you should avoid, when you are on the low-FODMAP diet. Grains  Okay to eat: Foods made from grains like arrowroot, buckwheat, cornmeal, millet, and oats. You can also eat potato flour, quinoa, rice, sorghum, tapioca, and teff. Cereals, pasta, breads, corn tortillas and baked goods made from these grains are also okay. (These grains may be labeled \"gluten-free. \")  Avoid: Grains like wheat, barley, and rye. Avoid ingredients such as bulgur, couscous, durum, and semolina. And avoid cereals, breads, and pastas made from these grains. Avoid chickpea, lentil, and pea flour. Proteins  Okay to eat: Most meat, fish, and eggs without high-FODMAP sauces. You can have small amounts of almonds or hazelnuts (10 nuts). Macadamia nuts, peanuts, pecans, pine nuts, and walnuts are also okay. You can also eat brendan and pumpkin seeds, tofu, and tempeh. Avoid: Beans, chickpeas, lentils, and soybeans. Avoid pistachio and cashew nuts. Avoid fatty or fried meats. And some sausages may have high-FODMAP ingredients. Dairy  Okay to eat: Lactose-free dairy milks. Rice milk and almond milk are okay. So are lactose-free yogurts, kefirs, ice creams, and sorbet from low-FODMAP fruits and sweeteners. (These are often labeled \"lactose-free. \") You can have small amounts (2 Tbsp) of cottage, cream, or ricotta cheese. Hard cheeses like cheddar, [place], ROSS, and Swiss are okay. So are small amounts (1 oz) of aged or ripened cheeses like Brie, blue, and feta. Avoid: Milk, including cow, goat, and sheep. Avoid condensed or evaporated milk, buttermilk, custard, cream, sour cream, yogurt, and ice cream. Avoid soy milk. (Check sauces for dairy ingredients.)  Vegetables  Okay to eat: Bamboo shoots, bell peppers, bok rosmery, broccoli, cabbage (red or white), and cucumbers. Eggplant, green beans, lettuce, olives, parsnips, and potatoes are okay to eat. So are pumpkin, rutabaga, seaweed, sprouts, Swiss chard, and spinach. You can eat scallions (green part only) and yellow or spaghetti squash. You can eat tomatoes, turnips, watercress, yams, and zucchini. You can also have small amounts of artichoke hearts (from can, 1 oz), carrots, corn (½ cob), and sweet potato (½ cup). Avoid: Artichokes, asparagus, [place] sprouts, asia cabbage, cauliflower, and celery. And avoid garlic, leeks, mushrooms, okra, onions, scallions (white part), shallots, and peas. Fruits  Okay to eat: Bananas, blueberries, cantaloupe, grapes, and honeydew. Kiwi, demarco, limes, oranges, passion fruit, papaya, and pineapple are also okay. You can eat plantain, raspberries, rhubarb, star fruit, strawberries, tangelo, and tangerine. You can also have small amounts of dried banana chips (up to 10 chips), dried cranberries (1 Tbsp), and shredded coconut (up to ¼ cup). Avoid: Apples, applesauce, apricots, avocados, blackberries, boysenberries, and cherries. Also avoid dates, figs, grapefruit, guava, lychee, and mangoes. Don't eat nectarines, peaches, pears, persimmon, plums, prunes, tamarillo, or watermelon. And limit most canned and dried fruits. Oils, spices, condiments, and sweeteners  Okay to eat: Vegetable oils (including garlic infused), butter, ghee, lard, and margarine.  You can have most fresh herbs like basil, chives, coriander, dave, parsley, rosemary, and thyme. You can have salt, jams made from low-FODMAP fruits, mayonnaise, and mustard. Soy sauce, hot sauce (no garlic), tamari, and vinegar are also okay. Sweeteners that are okay include sugar (sucrose), powdered (confectioner's) sugar, brown sugar, glucose, and maple syrup. You can also have some artificial sweeteners like aspartame, saccharine, and stevia. Avoid: Chutneys, hummus, jellies, garlic sauces, and gravies made with onion or garlic. Avoid pickles, relish, some salad dressings and soup stocks, salsa, and tomato paste. And avoid sauces and other foods with high fructose corn syrup, honey, molasses, and agave. Avoid artificial sweeteners (isomalt, mannitol, malitol, sorbitol, and xylitol). Avoid corn syrup solids, fructose, fruit juice concentrate, and polydextrose. Other foods and drinks  Okay to have: Water, soda water, tonic, soft drinks sweetened with sugar, ½ cup of low-FODMAP fruit juice, and most teas and alcohols. You can also eat foods made with baking powder and soda, cocoa, and gelatin. Avoid: Juices from high-FODMAP fruits and vegetables. And avoid fortified shaina, chamomile and fennel teas, chicory-based drinks and coffee substitutes, and bouillon cubes. Follow-up care is a key part of your treatment and safety. Be sure to make and go to all appointments, and call your doctor if you are having problems. It's also a good idea to know your test results and keep a list of the medicines you take. Current as of: October 6, 2021               Content Version: 13.5  © 2006-2022 [employer]. Care instructions adapted under license by [facility] ([facility]). If you have questions about a medical condition or this instruction, always ask your healthcare professional. [place] [address] any warranty or liability for your use of this information.

## 2023-02-27 NOTE — PROGRESS NOTES
Neel Vu, APRN-CNP  [facility]  [address] [address], [address]  [facility] [address]  Dept: [phone]  Dept Fax: [phone]     Patient ID: Prashanth Osuna is a 79 y.o. female Established patient    HPI    Pt here today for f/u and having sore throat and cough that started on Saturday, no one has had covid, go over labs and/or diagnostic studies, and medication refills. Pt denies any fever or chills. Pt today denies any HA, chest pain, or SOB. Pt denies any N/V/D/C or abdominal pain. - she has been having diarrhea more and seems to happen at least weekly, there aren't any rhyme or reason to it, it doesn't happen everyday but she did have a day when she couldn't stop going but that has resolved. - she did spring clean the house while the kids were gone on vacation and since then she is also having some lower back pain that goes down her right leg, denies any numbness or tingling, the pain isn't consistent tends to come and go or when she is standing or sitting for long periods of time she will feel it more. Otherwise pt doing well on current tx and no other concerns today. The patient's past medical, surgical, social, and family history as well as his current medications and allergies were reviewed as documented in today's encounter by SUSIE Eaton. Previous office notes, labs, imaging and hospital records were reviewed prior to and during encounter. Current Outpatient Medications on File Prior to Visit   Medication Sig Dispense Refill    tamsulosin (FLOMAX) 0.4 MG capsule TAKE ONE CAPSULE BY MOUTH DAILY 90 capsule 1    rosuvastatin (CRESTOR) 10 MG tablet TAKE ONE TABLET BY MOUTH DAILY 90 tablet 1    acyclovir (ZOVIRAX) 400 MG tablet Take 1 tablet by mouth 2 times daily 180 tablet 1    gabapentin (NEURONTIN) 600 MG tablet Take 2 tablets by mouth nightly for 90 days.  90 tablet 1    meloxicam (MOBIC) 15 MG tablet Take 1 tablet by mouth daily 90 tablet 1    escitalopram (LEXAPRO) 5 MG tablet Take 1 tablet by mouth nightly 90 tablet 1    losartan (COZAAR) 25 MG tablet Take 1 tablet by mouth daily 90 tablet 1    mirtazapine (REMERON) 30 MG tablet Take 1 tablet by mouth nightly 90 tablet 1    omeprazole (PRILOSEC) 40 MG delayed release capsule Take 1 capsule by mouth every morning (before breakfast) 1hr. Before eat breakfast 90 capsule 1    montelukast (SINGULAIR) 10 MG tablet Take 1 tablet by mouth nightly 90 tablet 3    metFORMIN (GLUCOPHAGE) 1000 MG tablet Take 1 tablet by mouth 2 times daily (with meals) 60 tablet 1    Continuous Blood Gluc Sensor (FREESTYLE JUANI 2 SENSOR) [facility] As directed, change every 14 days      triamcinolone (KENALOG) 0.1 % cream Apply topically 2 times daily. 453 g 0    ibuprofen (ADVIL;MOTRIN) 800 MG tablet Take 1 tablet by mouth 2 times daily as needed for Pain (Patient not taking: Reported on 2/9/2023) 60 tablet 1    fluticasone-salmeterol (ADVAIR DISKUS) 100-50 MCG/DOSE diskus inhaler Inhale 1 puff into the lungs 2 times daily 180 each 1    fluticasone (FLONASE) 50 MCG/ACT nasal spray 2 sprays by Nasal route daily 3 each 1    BD PEN NEEDLE WIN 2ND GEN 32G X 4 MM MISC       BASAGLAR KWIKPEN 100 UNIT/ML injection pen 22 Units nightly       NOVOLOG FLEXPEN 100 UNIT/ML injection pen Inject into the skin 3 times daily (before meals) Sliding sccale      aspirin 81 MG chewable tablet Take 81 mg by mouth daily      vitamin D 25 MCG (1000 UT) CAPS Take by mouth      GLUCOSAMINE HCL PO Take by mouth       Current Facility-Administered Medications on File Prior to Visit   Medication Dose Route Frequency Provider Last Rate Last Admin    lidocaine 1 % injection 0.5 mL  0.5 mL IntraDERmal Once Cheslea MD Clyde            Subjective:     Review of Systems   Constitutional:  Negative for activity change, fatigue and fever. HENT:  Positive for congestion and sore throat. Negative for ear pain (trouble hearing left ear) and rhinorrhea. Respiratory:  Positive for cough. Negative for chest tightness and shortness of breath. Cardiovascular:  Negative for chest pain and palpitations. Gastrointestinal:  Positive for diarrhea. Negative for abdominal distention, abdominal pain, constipation and nausea. Endocrine: Negative for polydipsia, polyphagia and polyuria. Genitourinary:  Negative for difficulty urinating and dysuria. Musculoskeletal:  Negative for arthralgias, back pain and myalgias. Skin:  Negative for color change and rash. Neurological:  Positive for headaches. Negative for dizziness, weakness and light-headedness. Hematological:  Negative for adenopathy. Psychiatric/Behavioral:  Negative for agitation and behavioral problems. The patient is not nervous/anxious. Vitals:    02/27/23 0724   BP: 126/80   Pulse: 84   Resp: 16   Temp: 97.1 °F (36.2 °C)   SpO2: 97%       Objective:     Physical Exam  Vitals reviewed. Constitutional:       General: She is not in acute distress. Appearance: Normal appearance. HENT:      Head: Normocephalic and atraumatic. Right Ear: Hearing, tympanic membrane, ear canal and external ear normal.      Left Ear: Tympanic membrane, ear canal and external ear normal. There is impacted cerumen. Nose: Nose normal.      Mouth/Throat:      Mouth: Mucous membranes are moist.      Pharynx: Pharyngeal swelling and posterior oropharyngeal erythema present. No oropharyngeal exudate. Eyes:      Extraocular Movements: Extraocular movements intact. Conjunctiva/sclera: Conjunctivae normal.      Pupils: Pupils are equal, round, and reactive to light. Cardiovascular:      Rate and Rhythm: Normal rate and regular rhythm. Pulses: Normal pulses. Heart sounds: Normal heart sounds. No murmur heard. Pulmonary:      Effort: Pulmonary effort is normal. No respiratory distress. Breath sounds: Normal breath sounds. No wheezing or rales.    Abdominal:      General: Bowel sounds are normal. There is no distension. Palpations: Abdomen is soft. Tenderness: There is no abdominal tenderness. Musculoskeletal:         General: Normal range of motion. Cervical back: Normal range of motion. Right lower leg: No edema. Left lower leg: No edema. Lymphadenopathy:      Cervical: No cervical adenopathy. Skin:     General: Skin is warm and dry. Neurological:      General: No focal deficit present. Mental Status: She is alert and oriented to person, place, and time. Deep Tendon Reflexes: Reflexes normal.   Psychiatric:         Mood and Affect: Mood normal.         Behavior: Behavior normal. Behavior is cooperative. Assessment:      Diagnosis Orders   1. Gastroesophageal reflux disease without esophagitis        2. Type I diabetes mellitus with complication (HCC)        3. Hypercholesterolemia  Lipid Panel      4. Vitamin D deficiency        5. Hypertension, unspecified type        6. Mild intermittent asthma without complication        7. Sore throat  POCT rapid strep A      8. Acute right-sided low back pain with right-sided sciatica  [facility] - [place]      9. Left ear impacted cerumen  [place] REMOVAL IMPACTED CERUMEN INSTRUMENTATION UNILAT        Plan:     Gastroesophageal reflux disease without esophagitis  - Stable: Medication re-filled as needed, con't medications as prescribed, con't current tx plan  - Continue prilosec as previously prescribed  - Patient educated on avoiding trigger food/drinks such as caffeine, soda, chocolate, greasy/fatty, spicy foods.  - Sleep with head of bed elevated, avoid lying flat after eating and avoid restrictive clothing around waist.     Hypertension  - Stable: Medication re-filled as needed, con't medications as prescribed, con't current tx plan  - Continue Cozaar and aspirin as previously prescribed  - Education provided on maintaining a low sodium diet and routine exercise.   - Advised to seek emergent tx if SBP>180 and/or DBP>110, any chest pain, h/a or vision changes    Hypercholesterolemia  -  Stable: Medication re-filled as needed, con't medications as prescribed, con't current tx plan  - Lifestyle changes: Decrease fats, sugars, carbohydrates, and increase routine exercise, try to get 150 minutes of aerobic activity a week  - Foods that helps increase HDL include the following: Fish, nuts, flax, avocados, and legumes (such as soy, kidney beans, chickpeas). - Will cont with low fat/chol diet and crestor as ordered. Vitamin D deficiency  - Stable: Medication re-filled as needed, con't medications as prescribed, con't current tx plan  - Continue Vitamin D supplements as ordered, will continue to follow Vitamin D level  - Please increase foods with Vitamin D, which include cheese, eggs, cereals, yogurt, milk, tofu, any type of beef, salmon or tuna,  spinach, and mushroom. - will continue to monitor. Type I diabetes mellitus with complication (HCC)  - Stable: Medication re-filled as needed, con't medications as prescribed, con't current tx plan  - will have A1c checked at appt with endo on Monday 8/8/2022.  - Continue with Metformin and insulin as previously prescribed. - Will cont with losartan for renal protection.  - Cont with daily foot exams and yearly eye exams. - continue to follow up with endocrinology. Mild intermittent asthma without complication  - Stable: Medication re-filled as needed, con't medications as prescribed, con't current tx plan  - Continue singulair and advair as previously prescribed. - Continue Albuterol PRN as previously prescribed. - Avoid triggers that may exacerbate symptoms.      Sore throat  - will start azithromycin as prescribed.  -Increase fluids, rest, call the office if s/s do not improve within 7 days or worsen at anytime, thorough hand washing techniques recommended, patient verbalized understanding    Acute right-sided low back pain with right-sided sciatica  - ice /heat as needed. - tylenol/ ibuprofen for pain. - referral to [address] [facility] - [place]    Diarrhea  - symptoms in line with IBS, will start with low fod-map diet and have her back in 1 month for follow up. - journal meals over the next month. Left ear impacted cerumen  - Verbal consent was obtained after discussion of procedure, risks and benefits. At this time, patient wishes to continue with procedure discussed  - right ear wash done today with a water and hand pic with good results. - Pt tolerated well. - Hearing/muffling has returned. - Patient instructed to get OTC Debrox, avoid Q-tips, wash ear canals out with warm water in shower. - [place] REMOVAL IMPACTED CERUMEN INSTRUMENTATION UNILAT    Return in about 1 month (around 3/27/2023) for AMW, diarrhea. - pt verbalized understanding plan of care. Medications, labs, diagnostic studies, consultations and follow-up as documented in this encounter. Rest of systems unchanged, continue current treatments    On this date 2/27/2023 I have spent 40 minutes reviewing previous notes, test results and face to face with the patient discussing the diagnosis and importance of compliance with the treatment plan as well as documenting on the day of the visit.     RAMON Duran-CNP

## 2023-03-08 ENCOUNTER — HOSPITAL ENCOUNTER (OUTPATIENT)
Dept: PHYSICAL THERAPY | Facility: CLINIC | Age: 68
Setting detail: THERAPIES SERIES
Discharge: HOME | End: 2023-03-08
Payer: MEDICARE

## 2023-03-08 PROCEDURE — 97161 PT EVAL LOW COMPLEX 20 MIN: CPT

## 2023-03-08 PROCEDURE — 97110 THERAPEUTIC EXERCISES: CPT

## 2023-03-08 NOTE — PRE-CERTIFICATION NOTE
Medicare Cap   [x] Physical Therapy  [] Speech Therapy  [] Occupational therapy  *PT and Speech caps combine      $2230 Limit for PT and Speech combined  $2230 Limit for OT individually  At the beginning of the month where you expect to go over $2150, please add the [address] [place] [address] modifier      Patient Name: Alma Delia Mendez: 1955    Note:  This is an estimate of charges billed.      Date of [address] Name # units/ charge $$$ charge Daily Total Charge Ongoing Total $$$   3/8/23 Low eval, TE 1,1 97.02+22.29 119.31 119.31

## 2023-03-08 NOTE — CONSULTS
[] [facility]) [facility]  Therapy  [address]  [phone]  F: [phone] [] [address]  [employer] [address]   [address]  P: [phone]  F: [phone] [x] [address] [facility]  Therapy  [address]  P: [phone]  F: [phone] [] [address]  P: [phone]  F: [phone] [] [address]  [facility]   [address]   [place]: [phone]  F: [phone]      Physical Therapy Spine Evaluation    Date:  3/8/2023  Patient: Em Sesay  : 1955  MRN: [id number]  Physician: Dawit Mortensen CNP   Insurance: Medicare (visits BMN)  Medical Diagnosis:   Diagnosis   M54.41 (ICD-10-CM) - Acute right-sided low back pain with right-sided sciatica   Rehab Codes: M54.41, R29.3, M62.81  Onset Date: 22   Next 's appt.: PRN    Subjective:   CC/HPI: Patient reports to physical therapy this date with (R) sided sciatica. Patient states that she has had sciatica for awhile now, however pain progressed last November. Patient describes pain as dull and achy, however can have shooting into the (R) LE with if she lifts something. Notes that pain starts in the lumbar spine and shoots to the posterior (R) thigh. States that cleaning the house, lifting objects and over exertion increase the pain. Notes that Aleve, Tylenol and Ibuprofen are the most beneficial in reducing the pain. States that she had a CT scan back in December for another issue that showed lumbar spondylosis. Patient states that she saw her PCP due to pain was referred to physical therapy at that time.      PMHx: [] Unremarkable [x] Diabetes- controlled with medication  [] HTN  [] Pacemaker   [] MI/Heart Problems  [] Cancer [x] Arthritis [] Other:              [x] Refer to full medical chart  In EPIC       Comorbidities:   [x] Obesity [] Dialysis  [] N/A   [x] Asthma/COPD [] Dementia [] Other:    [] Stroke [] Sleep apnea [] Other:   [] Vascular disease [] Rheumatic disease [] Other:     Tests: [] X-Ray: [x] MRI:  [] Other:      Medications: [x] Refer to full medical record [] None [] Other:  Allergies:      [x] Refer to full medical record [] None [] Other:    Function:  Hand Dominance  [] Right  [] Left  Patient lives with: Spouse, Father, Daughter    In what type of home [x]  One story   [] Two story   [] Split level   Number of stairs to enter One step    With handrail on the []  Right to enter   [] Left to enter   Bathroom has a []  Tub only  [x] Tub/shower combo   [] Walk in shower    []  Grab bars   Washing machine is on []  Main level   [] Second level   [] Basement   Employer    Job Status []  Normal duty   [] Light duty   [] Off due to condition    [x]  Retired   [] Not employed   [] Disability  [] Other:  []  Return to work:    Work activities/duties        ADL/IADL Previous level of function Current level of function Who currently assists the patient with task   Bathing  [x] Independent  [] Assist [x] Independent  [] Assist    Dress/grooming [x] Independent  [] Assist [x] Independent  [] Assist    Transfer/mobility [x] Independent  [] Assist [x] Independent  [] Assist    Feeding [x] Independent  [] Assist [x] Independent  [] Assist    Toileting [x] Independent  [] Assist [x] Independent  [] Assist    Driving [x] Independent  [] Assist [x] Independent  [] Assist Difficulty with sitting for long periods of time    Housekeeping [x] Independent  [] Assist [x] Independent  [] Assist Reaching to clean aggravates pain    Grocery shop/meal prep [x] Independent  [] Assist [x] Independent  [] Assist      Gait Prior level of function Current level of function    [x] Independent  [] Assist [x] Independent  [] Assist   Device: [x] Independent [x] Independent    [] Straight Cane [] [employer] cane [] Straight Cane [] Quad cane    [] Standard walker [] Rolling walker   [] 4 wheeled walker [] Standard walker [] Rolling walker   [] 4 wheeled walker    [] Wheelchair [] Wheelchair     Pain:  [x] Yes  [] No Location: Low back Pain Rating: (0-10 scale) 4-5/10  Pain altered Tx:  [] Yes  [x] No  Action:    Symptoms:  [] Improving [] Worsening [] Same  Better:  [] AM    [] PM    [] Sit    [] Rise/Sit    []Stand    [] Walk    [] Lying    [x] Other:Tylenol, Aleve, Ibuprofen   Worse: [] AM    [] PM    [x] Sit    [] Rise/Sit    []Stand    [] Walk    [] Lying    [] Bend                      [] Valsalva    [x] Other: Lifting, housework  Sleep: [x] OK    [] Disturbed    Objective:   STRENGTH  ROM    Left Right Cervical    L1-2 Hip Flex 3+ 3+ Flexion    Hip Abd 4 4 Extension    Hip Ext 3 3      Knee flexion 4 4      L3-4 Knee Ext 5 5 Rotation L R   L4 Ankle DF 5 5 Sidebend L R   L5 EHL 5 5 Retraction    S1 Plant.  Flex 5 5 Lumbar    Abdominals   Flexion 100%    Erector Spinae   Extension 75%      Rotation L 75% R  75%      Sidebend L   100% R 100%      UE/LE                                               TESTS (+/-) LEFT RIGHT Not Tested   SLR [] sit [] supine   []   Hamstring (90/90) 178 deg 175 deg  [x]   SKTC (-) (-) []   DKTC (-) (-) []   Slump/Dural   [x]   SI JT (-) (+) long sit test for anterior innominate torsion  []   NIMA (-) (+) []   FADDIR (-) (-) []       OBSERVATION No Deficit Deficit Not Tested Comments   Posture       Forward Head [] [x] []    Rounded Shoulders [] [x] []    Kyphosis [] [] []    Lordosis [] [x] [] increased   Lateral Shift [] [] []    Scoliosis [] [] []    Iliac Crest [] [x] [] (L) iliac crest elevated    PSIS [] [] [] (R) PSIS elevated    ASIS [] [x] [] (L) ASIS elevated   Genu Valgus [] [x] []    Genu Varus [x] [] []    Genu Recurvatum [x] [] []    Palpation [] [x] [] TTP (R) PSIS, (R) piriformis   Sensation [x] [] []    SLS [] [x] [] (L) SLS: 12 sec, (R) SLS: 3 sec   6 min walk test [] [] [x]        Functional Test: SMITH Score: 16% functionally impaired     Comments:    Assessment:  Patient is a 79year old female who presents to physical therapy with low back pain and (R) sided sciatica. Patient demonstrates impairments in pain tolerance, lumbar AROM, pelvic rotating, (B) LE strength and balance. These impairments affect the patient's ability to complete ADLs, household related tasks and care taking tasks without pain. Patient would benefit from skilled physical therapy in order to improve impairments and overall quality of life. Problems:    [x] ? Pain:  [x] ? ROM:  [x] ? Strength:  [x] ? Function:  [] Other:      STG: (to be met in 6 treatments)  ? Pain: Patient will report pain as 2/10  ? ROM: Patient will demonstrate lumbar AROM as 100% within all planes in order to increase ease of performing ADLs  Patient will demonstrate equal pelvic alignment   Patient to be independent with home exercise program as demonstrated by performance with correct form without cues  LTG: (to be met in 12 treatments)  Patient will report SMITH as 6% functional impairment in order to indicate improved overall quality of life   Patient will improve (B) LE strength to 5/5 in order to increase ease of performing lifting tasks   Patient will demonstrate good lifting mechanics with use of core and (B) LE activation to reduce tension on low back   Patient will improve (B) SLS to 15 sec in order to improve stability within (B) LE and decrease patient's risk of falling  Patient will report pain as 0/10      Patient goals: \"Lessen pain\"    Rehab Potential:  [x] Good  [] Fair  [] Poor   Suggested Professional Referral:  [x] No  [] Yes:  Barriers to Goal Achievement:  [x] No  [] Yes:  Domestic Concerns:  [x] No  [] Yes:    Pt. Education:  [x] Plans/Goals, Risks/Benefits discussed  [x] Home exercise program  Access Code: [email or web address]  URL: [email or web address]  Date: 03/08/2023  Prepared by: Birgit Whyte Wintrow    Exercises  Modified Josiah Stretch - 2 x daily - 7 x weekly - 3 sets - 30 sec hold  Supine Figure 4 Piriformis Stretch - 2 x daily - 7 x weekly - 3 sets - 30 sec hold  Supine Piriformis Stretch with Foot on Ground - 2 x daily - 7 x weekly - 3 sets - 30 sec hold  Supine Bridge - 2 x daily - 7 x weekly - 2 sets - 10 reps  Clamshell - 2 x daily - 7 x weekly - 2 sets - 10 reps  Sidelying Hip Abduction - 2 x daily - 7 x weekly - 2 sets - 10 reps  Active Straight Leg Raise with Quad Set - 2 x daily - 7 x weekly - 2 sets - 10 reps      Method of Education: [x] Verbal  [x] Demo  [x] Written  Comprehension of Education:  [x] Verbalizes understanding. [x] Demonstrates understanding. [x] Needs Review. [] Demonstrates/verbalizes understanding of HEP/Ed previously given. Treatment Plan:  [x] Therapeutic Exercise   33027  [] Iontophoresis: 4 mg/mL Dexamethasone Sodium Phosphate  mAmin  39487   [x] Therapeutic Activity  46545 [] Vasopneumatic cold with compression  49003    [] Gait Training   77213 [] Ultrasound   22234   [] Neuromuscular Re-education  78939 [] Electrical Stimulation Unattended  45012   [x] Manual Therapy  46848 [] Electrical Stimulation Attended  19673   [x] Instruction in HEP  [] Lumbar/Cervical Traction  09306   [] Aquatic Therapy   31882 [x] Cold/hotpack    [] Massage   97520      [] Dry Needling, 1 or 2 muscles  47949   [] Biofeedback, first 15 minutes   85052  [] Biofeedback, additional 15 minutes   32377 [] Dry Needling, 3 or more muscles  97871     [x]  Medication allergies reviewed for use of    Dexamethasone Sodium Phosphate 4mg/ml     with iontophoresis treatments. Pt is not allergic.     Frequency:  2 x/week for 12 visits    Todays Treatment:  Modalities:   Precautions:  Exercises:  Exercise Reps/ Time Weight/ Level Comments   Nustep            SB S      HS S      Figure 4 S 30\"x3     Piriformis S 30\"x3           SLR 2x10     Bridges 2x10     Clamshells 2x10     SL Hip abd 2x10 SLS      Other:    Specific Instructions for next treatment: Recheck pelvic alignment, progress (B) hip strength, progress core strength       Evaluation Complexity:  History (Personal factors, comorbidities) [x] 0 [] 1-2 [] 3+   Exam (limitations, restrictions) [x] 1-2 [] 3 [] 4+   Clinical presentation (progression) [x] Stable [] Evolving  [] Unstable   Decision Making [x] Low [] Moderate [] High    [x] Low Complexity [] Moderate Complexity [] High Complexity       Treatment Charges: Mins Units   [x] Evaluation       [x]  Low       []  Moderate       []  High 25 1   []  Modalities     [x]  Ther Exercise 21 1   []  Manual Therapy     []  Ther Activities     []  Aquatics     []  Vasocompression     []  Other       TOTAL TREATMENT TIME: 46 min    Time in: 8:10 am      Time out: 8:56 am    Electronically signed by: Chery Jaime PT        Physician Signature:________________________________Date:__________________  By signing above or cosigning this note, I have reviewed this plan of care and certify a need for medically necessary rehabilitation services.      *PLEASE SIGN ABOVE AND FAX BACK ALL PAGES*

## 2023-03-08 NOTE — FLOWSHEET NOTE
[facility]rie Fall Risk Assessment    Patient Name:  Renetta Tamayo  : 1955    Risk Factor Scale  Score   History of Falls [] Yes  [x] No 25  0 0   Secondary Diagnosis [] Yes  [x] No 15  0 0   Ambulatory Aid [] Furniture  [] Crutches/cane/walker  [x] None/bedrest/wheelchair/nurse 30  15  0 0   IV/Heparin Lock [] Yes  [x] No 20  0 0   Gait/Transferring [] Impaired  [] Weak  [x] Normal/bedrest/immobile 20  10  0 0   Mental Status [] Forgets limitations  [x] Oriented to own ability 15  0 0      Total:0     Based on the Assessment score: check the appropriate box.     [x]  No intervention needed   Low =   Score of 0-24    []  Use standard prevention interventions Moderate =  Score of 24-44   [] Give patient handout and discuss fall prevention strategies   [] Establish goal of education for patient/family RE: fall prevention strategies    []  Use high risk prevention interventions High = Score of 45 and higher   [] Give patient handout and discuss fall prevention strategies   [] Establish goal of education for patient/family Re: fall prevention strategies   [] Discuss lifeline / other resources    Electronically signed by:   Chery Jaime PT  Date: 3/8/2023

## 2023-03-13 ENCOUNTER — HOSPITAL ENCOUNTER (OUTPATIENT)
Dept: PHYSICAL THERAPY | Facility: CLINIC | Age: 68
Setting detail: THERAPIES SERIES
Discharge: HOME | End: 2023-03-13
Payer: MEDICARE

## 2023-03-13 PROCEDURE — 97110 THERAPEUTIC EXERCISES: CPT

## 2023-03-13 NOTE — PRE-CERTIFICATION NOTE
Medicare Cap   [x] Physical Therapy  [] Speech Therapy  [] Occupational therapy  *PT and Speech caps combine      $2230 Limit for PT and Speech combined  $2230 Limit for OT individually  At the beginning of the month where you expect to go over $2150, please add the [address] [place] modifier      Patient Name: Rica Rm: 1955    Note:  This is an estimate of charges billed.      Date of [address] Name # units/ charge $$$ charge Daily Total Charge Ongoing Total $$$   3/8/23 Low eval, TE 1,1 97.02+22.29 119.31 119.31   3-13 Ex 3  22.29+19.62+19.62 61.53 180.84

## 2023-03-13 NOTE — FLOWSHEET NOTE
[x] [address]  Outpatient Rehabilitation &  Therapy  [facility]  P: [phone]  F: [phone]     Physical Therapy Daily Treatment Note    Date:  3/13/2023  Patient Name:  Kyler Aburto    :  1955  MRN: [id number]  Physician: Clara Shirley CNP                            Insurance: Medicare (visits BMN)  Medical Diagnosis:   Diagnosis   M54.41 (ICD-10-CM) - Acute right-sided low back pain with right-sided sciatica   Rehab Codes: M54.41, R29.3, M62.81  Onset Date: 22                           Next 's appt.: PRN    Visit# / total visits:      Cancels/No Shows: 0    Subjective:    Pain:  [x] Yes  [] No Location: R LB N/A Pain Rating: (0-10 scale) 2/10  Pain altered Tx:  [] No  [] Yes  Action:  Comments: Pt mentioned that she still has some pain on right side. Objective:  Precautions:  Exercises:  Exercise Reps/ Time Weight/ Level Comments    Nustep 10m     x              SB S 30\"x3     x   HS S 30\"x3     x   LTR 10x10\"   x   Figure 4 S 30\"x3     x   Piriformis S 30\"x3                   SLR 2x10     x   Bridges 20x2\"     x   Clamshells 2x10     x   SL Hip abd 2x10     x   SLS          Other:     Specific Instructions for next treatment: Recheck pelvic alignment, progress (B) hip strength, progress core strength         Treatment Charges: Mins Units   []  Modalities     [x]  Ther Exercise 40 3   []  Manual Therapy     []  Ther Activities     []  Aquatics     []  Vasocompression     []  Other     Total Treatment time 40 3       Assessment: [x] Progressing toward goals. [] No change. [x] Other: Pt able to initiate session on the SciFit to work on increasing blood flow. Performed all standing and table stretches with no issues. Continued work on hip and core exercises with no added resistance today and focused on form. Demo standing hip flexion stretch today d/t pt having difficulty with supine hip flexion stretch at home.        STG: (to be met in 6 treatments)  ? Pain: Patient will report pain as 2/10  ? ROM: Patient will demonstrate lumbar AROM as 100% within all planes in order to increase ease of performing ADLs  Patient will demonstrate equal pelvic alignment   Patient to be independent with home exercise program as demonstrated by performance with correct form without cues  LTG: (to be met in 12 treatments)  Patient will report SMITH as 6% functional impairment in order to indicate improved overall quality of life   Patient will improve (B) LE strength to 5/5 in order to increase ease of performing lifting tasks   Patient will demonstrate good lifting mechanics with use of core and (B) LE activation to reduce tension on low back   Patient will improve (B) SLS to 15 sec in order to improve stability within (B) LE and decrease patient's risk of falling  Patient will report pain as 0/10        Patient goals: \"Lessen pain\"    Pt. Education:  [x] Yes  [] No  [] Reviewed Prior HEP/Ed  Method of Education: [x] Verbal  [] Demo  [] Written  Comprehension of Education:  [x] Verbalizes understanding. [] Demonstrates understanding. [] Needs review. [] Demonstrates/verbalizes HEP/Ed previously given. Plan: [x] Continue with current plan of care towards goals.         Time In: 9:00 am            Time Out: 9:58 am    Electronically signed by:  Henry Thorne PTA

## 2023-03-16 ENCOUNTER — HOSPITAL ENCOUNTER (OUTPATIENT)
Dept: PHYSICAL THERAPY | Facility: CLINIC | Age: 68
Setting detail: THERAPIES SERIES
Discharge: HOME | End: 2023-03-16
Payer: MEDICARE

## 2023-03-16 PROCEDURE — 97110 THERAPEUTIC EXERCISES: CPT

## 2023-03-16 NOTE — FLOWSHEET NOTE
[x] [address]   Outpatient Rehabilitation &  Therapy  [address] [facility]  P: [phone]  F: [phone]     Physical Therapy Daily Treatment Note    Date:  3/16/2023  Patient Name:  Rosalba Feliz    :  1955  MRN: [id number]  Physician: Tano Awan CNP                            Insurance: Medicare (visits BMN)  Medical Diagnosis:   Diagnosis   M54.41 (ICD-10-CM) - Acute right-sided low back pain with right-sided sciatica   Rehab Codes: M54.41, R29.3, M62.81  Onset Date: 22                           Next 's appt.: PRN    Visit# / total visits: 3/12     Cancels/No Shows: 0    Subjective:    Pain:  [x] Yes  [] No Location: R LB N/A Pain Rating: (0-10 scale) 0-1/10  Pain altered Tx:  [x] No  [] Yes  Action:  Comments: Pt stated no shooting pain today just a dull ache. Objective:  Precautions:  Exercises:  Exercise Reps/ Time Weight/ Level Comments    Nustep 10m     x              SB S 30\"x3     x   HS S 30\"x3     x   LTR 10x10\"   x   Figure 4 S 30\"x3     x   Piriformis S 30\"x3     x              SLR 2x10     x   Bridges 20x2\" Orange    x   Clamshells 2x10 Orange    x   SL Hip abd 2x10 Orange    x   SLS          Other:     Specific Instructions for next treatment: Recheck pelvic alignment, progress (B) hip strength, progress core strength         Treatment Charges: Mins Units   []  Modalities     [x]  Ther Exercise 40 3   []  Manual Therapy     []  Ther Activities     []  Aquatics     []  Vasocompression     []  Other     Total Treatment time 40 3       Assessment: [x] Progressing toward goals. [] No change. [x] Other: Pt able to continue with warm up on NuStep followed by standing stretches. Continued to mat table to work on LB/glut stretches. Able to add in a light orange resistance band to hip/core strengthening exercises with good mm activation and mechanics. Will provide pt with updated HEP after next session.  Educated pt today on body mechanics and mm activation when picking up and carrying her grandson. STG: (to be met in 6 treatments)  ? Pain: Patient will report pain as 2/10  ? ROM: Patient will demonstrate lumbar AROM as 100% within all planes in order to increase ease of performing ADLs  Patient will demonstrate equal pelvic alignment   Patient to be independent with home exercise program as demonstrated by performance with correct form without cues  LTG: (to be met in 12 treatments)  Patient will report SMITH as 6% functional impairment in order to indicate improved overall quality of life   Patient will improve (B) LE strength to 5/5 in order to increase ease of performing lifting tasks   Patient will demonstrate good lifting mechanics with use of core and (B) LE activation to reduce tension on low back   Patient will improve (B) SLS to 15 sec in order to improve stability within (B) LE and decrease patient's risk of falling  Patient will report pain as 0/10        Patient goals: \"Lessen pain\"    Pt. Education:  [x] Yes  [] No  [] Reviewed Prior HEP/Ed  Method of Education: [x] Verbal  [] Demo  [] Written  Comprehension of Education:  [x] Verbalizes understanding. [] Demonstrates understanding. [] Needs review. [] Demonstrates/verbalizes HEP/Ed previously given. Plan: [x] Continue with current plan of care towards goals.         Time In: 8:00 am            Time Out: 9:02 am    Electronically signed by:  Sin Alcantara PTA

## 2023-03-16 NOTE — PRE-CERTIFICATION NOTE
Medicare Cap   [x] Physical Therapy  [] Speech Therapy  [] Occupational therapy  *PT and Speech caps combine      $2230 Limit for PT and Speech combined  $2230 Limit for OT individually  At the beginning of the month where you expect to go over $2150, please add the [address] [place] [address] modifier      Patient Name: Deena Recio: 1955    Note:  This is an estimate of charges billed.      Date of [address] Name # units/ charge $$$ charge Daily Total Charge Ongoing Total $$$   3/8/23 Low eval, TE 1,1 97.02+22.29 119.31 119.31   3-13 Ex 3  22.29+19.62+19.62 61.53 180.84   3/16 Ex 3  22.29+19.62+19.62 61.53 242.37

## 2023-03-20 ENCOUNTER — HOSPITAL ENCOUNTER (OUTPATIENT)
Dept: PHYSICAL THERAPY | Facility: CLINIC | Age: 68
Setting detail: THERAPIES SERIES
Discharge: HOME | End: 2023-03-20
Payer: MEDICARE

## 2023-03-20 PROCEDURE — 97110 THERAPEUTIC EXERCISES: CPT

## 2023-03-20 NOTE — PRE-CERTIFICATION NOTE
Medicare Cap   [x] Physical Therapy  [] Speech Therapy  [] Occupational therapy  *PT and Speech caps combine      $2230 Limit for PT and Speech combined  $2230 Limit for OT individually  At the beginning of the month where you expect to go over $2150, please add the [address] [place] [address] modifier      Patient Name: Gemma Gitelman: 1955    Note:  This is an estimate of charges billed.      Date of [address] Name # units/ charge $$$ charge Daily Total Charge Ongoing Total $$$   3/8/23 Low eval, TE 1,1 97.02+22.29 119.31 119.31   3-13 Ex 3  22.29+19.62+19.62 61.53 180.84   3/16 Ex 3  22.29+19.62+19.62 61.53 242.37   3/20 Ex 3  22.29+19.62+19.62 61.53 303.90

## 2023-03-20 NOTE — FLOWSHEET NOTE
[x] [address]   Outpatient Rehabilitation &  Therapy  [address] [facility]  P: [phone]  F: [phone]     Physical Therapy Daily Treatment Note    Date:  3/20/2023  Patient Name:  Paloma Pepper    :  1955  MRN: [id number]  Physician: Abiodun Vega CNP                            Insurance: Medicare (visits BMN)  Medical Diagnosis:   Diagnosis   M54.41 (ICD-10-CM) - Acute right-sided low back pain with right-sided sciatica   Rehab Codes: M54.41, R29.3, M62.81  Onset Date: 22                           Next 's appt.: PRN    Visit# / total visits:      Cancels/No Shows: 0    Subjective:    Pain:  [x] Yes  [] No Location: R LB N/A Pain Rating: (0-10 scale) 0-1/10  Pain altered Tx:  [x] No  [] Yes  Action:  Comments: Pt with no major issues to c/o about today, just a little tight. Objective:  Precautions:  Exercises:  Exercise Reps/ Time Weight/ Level Comments    Nustep 10m     x              SB S 30\"x3     x   HS S 30\"x3     x   LTR 10x10\"   x   Figure 4 S 30\"x3        Piriformis S 30\"x3                   SLR 2x10     x   Bridges 20x2\" Orange    x   Clamshells 2x10 Orange    x   SL Hip abd 2x10 Orange    x   SLS                 TG squats 20x L 15  x   3-way hip  15x Orange  Flex/abd/ext  x   Other:     Specific Instructions for next treatment:  progress (B) hip strength, progress core strength         Treatment Charges: Mins Units   []  Modalities     [x]  Ther Exercise 40 3   []  Manual Therapy     []  Ther Activities     []  Aquatics     []  Vasocompression     []  Other     Total Treatment time 40 3       Assessment: [x] Progressing toward goals. [] No change. [x] Other: Pt able to progress into some standing exercises today with the addition of 3-way hip. Focus on tall posturing and actively keeping her core engaged. TG add into for LE mobility and strengthening. Continued working on hip/core stability and strength with mat table exercises.  Finished

## 2023-03-23 ENCOUNTER — HOSPITAL ENCOUNTER (OUTPATIENT)
Dept: PHYSICAL THERAPY | Facility: CLINIC | Age: 68
Setting detail: THERAPIES SERIES
Discharge: HOME | End: 2023-03-23
Payer: MEDICARE

## 2023-03-23 PROCEDURE — 97110 THERAPEUTIC EXERCISES: CPT

## 2023-03-23 NOTE — FLOWSHEET NOTE
[x] [address]    Outpatient Rehabilitation &  Therapy  [address] [facility]  P: [phone]  F: [phone]     Physical Therapy Daily Treatment Note    Date:  3/23/2023  Patient Name:  Sulema Craven    :  1955  MRN: [id number]  Physician: Vignesh Squires CNP                            Insurance: Medicare (visits BMN)  Medical Diagnosis:   Diagnosis   M54.41 (ICD-10-CM) - Acute right-sided low back pain with right-sided sciatica   Rehab Codes: M54.41, R29.3, M62.81  Onset Date: 22                           Next 's appt.: PRN    Visit# / total visits:      Cancels/No Shows: 0    Subjective:    Pain:  [x] Yes  [] No Location: R LB N/A Pain Rating: (0-10 scale) 0-1/10  Pain altered Tx:  [x] No  [] Yes  Action:  Comments: Pt stated that she is feeling pretty good today. Objective:  Precautions:  Exercises:  Exercise Reps/ Time Weight/ Level Comments    Nustep 10m     x              SB S 30\"x3     x   HS S 30\"x3     x   LTR 10x10\"   x   Figure 4 S 30\"x3        Piriformis S 30\"x3                   SLR 2x10     x   Bridges 20x2\" Orange    x   Clamshells 2x10 Orange    x   SL Hip abd 2x10 Orange    x   SLS                 TG squats 20x L 16  x   3-way hip  15x Orange  Flex/abd/ext  x   Other:     Specific Instructions for next treatment:  progress (B) hip strength, progress core strength         Treatment Charges: Mins Units   []  Modalities     [x]  Ther Exercise 40 3   []  Manual Therapy     []  Ther Activities     []  Aquatics     []  Vasocompression     []  Other     Total Treatment time 40 3       Assessment: [x] Progressing toward goals. [] No change. [x] Other: Pt able to complete all of her stretches and warm up. Pt did require mod/max verbal cueing for exercise recall and technique. No further progressions today d/t newly installed insulin pump and mild confusion today. STG: (to be met in 6 treatments)  ? Pain: Patient will report pain as 2/10  ?

## 2023-03-23 NOTE — PRE-CERTIFICATION NOTE
Medicare Cap   [x] Physical Therapy  [] Speech Therapy  [] Occupational therapy  *PT and Speech caps combine      $2230 Limit for PT and Speech combined  $2230 Limit for OT individually  At the beginning of the month where you expect to go over $2150, please add the [address] [place] [address] modifier      Patient Name: Lisa Godinez: 1955    Note:  This is an estimate of charges billed.      Date of [address] Name # units/ charge $$$ charge Daily Total Charge Ongoing Total $$$   3/8/23 Low eval, TE 1,1 97.02+22.29 119.31 119.31   3-13 Ex 3  22.29+19.62+19.62 61.53 180.84   3/16 Ex 3  22.29+19.62+19.62 61.53 242.37   3/20 Ex 3  22.29+19.62+19.62 61.53 303.90   3/23 Ex 3  22.29+19.62+19.62 61.53 365.43

## 2023-03-27 ENCOUNTER — HOSPITAL ENCOUNTER (OUTPATIENT)
Dept: PHYSICAL THERAPY | Facility: CLINIC | Age: 68
Setting detail: THERAPIES SERIES
Discharge: HOME | End: 2023-03-27
Payer: MEDICARE

## 2023-03-27 PROCEDURE — 97110 THERAPEUTIC EXERCISES: CPT

## 2023-03-27 NOTE — FLOWSHEET NOTE
Added TA ex to enhance core and lumbar stabilization as pt reports she has pain when lifting her grandson. Encouraged proper posture and keeping the core engaged with ADL's and lifting with good understanding. Provided lime band for HEP and encouraged compliance. Pt stated she felt good post treatment. [] No change. [] Other:       STG: (to be met in 6 treatments)  ? Pain: Patient will report pain as 2/10  ? ROM: Patient will demonstrate lumbar AROM as 100% within all planes in order to increase ease of performing ADLs  Patient will demonstrate equal pelvic alignment   Patient to be independent with home exercise program as demonstrated by performance with correct form without cues  LTG: (to be met in 12 treatments)  Patient will report SMITH as 6% functional impairment in order to indicate improved overall quality of life   Patient will improve (B) LE strength to 5/5 in order to increase ease of performing lifting tasks   Patient will demonstrate good lifting mechanics with use of core and (B) LE activation to reduce tension on low back   Patient will improve (B) SLS to 15 sec in order to improve stability within (B) LE and decrease patient's risk of falling  Patient will report pain as 0/10        Patient goals: \"Lessen pain\"    Pt. Education:  [x] Yes  [] No  [] Reviewed Prior HEP/Ed  Method of Education: [x] Verbal  [] Demo  [] Written  Comprehension of Education:  [x] Verbalizes understanding. [] Demonstrates understanding. [] Needs review. [] Demonstrates/verbalizes HEP/Ed previously given. Plan: [x] Continue with current plan of care towards goals. Time In: 8:03 am            Time Out: 8:55 AM    Electronically signed by:   Brandi Carter, [place]

## 2023-03-27 NOTE — PRE-CERTIFICATION NOTE
Medicare Cap   [x] Physical Therapy  [] Speech Therapy  [] Occupational therapy  *PT and Speech caps combine      $2230 Limit for PT and Speech combined  $2230 Limit for OT individually  At the beginning of the month where you expect to go over $2150, please add the [address] [place] [address] modifier      Patient Name: Krysta Tim: 1955    Note:  This is an estimate of charges billed.      Date of [address] Name # units/ charge $$$ charge Daily Total Charge Ongoing Total $$$   3/8/23 Low eval, TE 1,1 97.02+22.29 119.31 119.31   3-13 Ex 3  22.29+19.62+19.62 61.53 180.84   3/16 Ex 3  22.29+19.62+19.62 61.53 242.37   3/20 Ex 3  22.29+19.62+19.62 61.53 303.90   3/23 Ex 3  22.29+19.62+19.62 61.53 365.43   3/27 Ex 3  25.10+19.62+19.62 64.34 429.77

## 2023-03-30 ENCOUNTER — HOSPITAL ENCOUNTER (OUTPATIENT)
Dept: PHYSICAL THERAPY | Facility: CLINIC | Age: 68
Setting detail: THERAPIES SERIES
Discharge: HOME | End: 2023-03-30
Payer: MEDICARE

## 2023-03-30 PROCEDURE — 97110 THERAPEUTIC EXERCISES: CPT

## 2023-03-30 NOTE — FLOWSHEET NOTE
[x] [address]    Outpatient Rehabilitation &  Therapy  [address] [facility]  P: [phone]  F: [phone]     Physical Therapy Daily Treatment Note    Date:  3/30/2023  Patient Name:  Gem Camarillo    :  1955  MRN: [id number]  Physician: Ericka Witt CNP                            Insurance: Medicare (visits BMN)  Medical Diagnosis:   Diagnosis   M54.41 (ICD-10-CM) - Acute right-sided low back pain with right-sided sciatica   Rehab Codes: M54.41, R29.3, M62.81  Onset Date: 22                           Next 's appt.: PRN    Visit# / total visits:      Cancels/No Shows: 0    Subjective:    Pain:  [x] Yes  [] No Location: R LB N/A Pain Rating: (0-10 scale) 0-1/10  Pain altered Tx:  [x] No  [] Yes  Action:  Comments: Pt arrived reporting some general stiffness however denies any sig pain. Noted she picked up her grandson yesterday for the first time without pain. Objective:  Precautions:  Exercises:  Exercise Reps/ Time Weight/ Level Comments    Nustep 10m     x              SB S 30\"x3     x   HS S 30\"x3     x   LTR 10x10\"   -   Figure 4 S 30\"x3        Piriformis S 30\"x3     x              SLR 2x10        Bridges 20x2\" Lime   x   Clamshells 2x10 Lime   x   SL Hip abd 2x10 Lime   x   SLS          TA Sets 10x5\"   x   TA + Marches 10x   x          TG squats/HR 20x L19  x   TG Sidestepping 2L   x   3-way hip  20x Lime  Flex/abd/ext  x   Pallof Press 10x Red Cord  x   Other:     Specific Instructions for next treatment:  progress (B) hip strength, progress core strength         Treatment Charges: Mins Units   []  Modalities     [x]  Ther Exercise 46 3   []  Manual Therapy     []  Ther Activities     []  Aquatics     []  Vasocompression     []  Other     Total Treatment time 46 3       Assessment: [x] Progressing toward goals. Cont with warmup prior to stretching.  Able to progress pt with the addition of TB sidestepping and pallof press to further aid with hip/core

## 2023-03-30 NOTE — PRE-CERTIFICATION NOTE
Medicare Cap   [x] Physical Therapy  [] Speech Therapy  [] Occupational therapy  *PT and Speech caps combine      $2230 Limit for PT and Speech combined  $2230 Limit for OT individually  At the beginning of the month where you expect to go over $2150, please add the [address] [place] [address] modifier      Patient Name: Belgica Pierce: 1955    Note:  This is an estimate of charges billed.      Date of [address] Name # units/ charge $$$ charge Daily Total Charge Ongoing Total $$$   3/8/23 Low eval, TE 1,1 97.02+22.29 119.31 119.31   3-13 Ex 3  22.29+19.62+19.62 61.53 180.84   3/16 Ex 3  22.29+19.62+19.62 61.53 242.37   3/20 Ex 3  22.29+19.62+19.62 61.53 303.90   3/23 Ex 3  22.29+19.62+19.62 61.53 365.43   3/27 Ex 3  25.10+19.62+19.62 64.34 429.77   3/30 EX 3  25.10+19.62+19.62 64.34 494.11

## 2023-03-31 ENCOUNTER — PATIENT MESSAGE (OUTPATIENT)
Dept: FAMILY MEDICINE CLINIC | Age: 68
End: 2023-03-31

## 2023-03-31 ENCOUNTER — OFFICE VISIT (OUTPATIENT)
Dept: FAMILY MEDICINE CLINIC | Age: 68
End: 2023-03-31

## 2023-03-31 VITALS
HEIGHT: 63 IN | SYSTOLIC BLOOD PRESSURE: 110 MMHG | RESPIRATION RATE: 16 BRPM | DIASTOLIC BLOOD PRESSURE: 68 MMHG | BODY MASS INDEX: 29.06 KG/M2 | WEIGHT: 164 LBS | OXYGEN SATURATION: 98 % | HEART RATE: 82 BPM | TEMPERATURE: 96.8 F

## 2023-03-31 DIAGNOSIS — Z00.00 INITIAL MEDICARE ANNUAL WELLNESS VISIT: Primary | ICD-10-CM

## 2023-03-31 DIAGNOSIS — K21.9 GASTROESOPHAGEAL REFLUX DISEASE WITHOUT ESOPHAGITIS: ICD-10-CM

## 2023-03-31 DIAGNOSIS — J45.20 MILD INTERMITTENT ASTHMA WITHOUT COMPLICATION: ICD-10-CM

## 2023-03-31 DIAGNOSIS — E10.8 TYPE I DIABETES MELLITUS WITH COMPLICATION (HCC): ICD-10-CM

## 2023-03-31 DIAGNOSIS — E78.00 HYPERCHOLESTEROLEMIA: ICD-10-CM

## 2023-03-31 DIAGNOSIS — E55.9 VITAMIN D DEFICIENCY: ICD-10-CM

## 2023-03-31 DIAGNOSIS — I10 HYPERTENSION, UNSPECIFIED TYPE: ICD-10-CM

## 2023-03-31 DIAGNOSIS — M17.11 OSTEOARTHRITIS OF RIGHT KNEE, UNSPECIFIED OSTEOARTHRITIS TYPE: ICD-10-CM

## 2023-03-31 PROBLEM — M25.561 RIGHT KNEE PAIN: Status: RESOLVED | Noted: 2021-11-29 | Resolved: 2023-03-31

## 2023-03-31 PROBLEM — M75.121 COMPLETE TEAR OF RIGHT ROTATOR CUFF: Status: RESOLVED | Noted: 2022-01-07 | Resolved: 2023-03-31

## 2023-03-31 PROBLEM — M25.511 RIGHT SHOULDER PAIN: Status: RESOLVED | Noted: 2021-11-29 | Resolved: 2023-03-31

## 2023-03-31 PROBLEM — M75.81 ROTATOR CUFF TENDONITIS, RIGHT: Status: RESOLVED | Noted: 2021-05-04 | Resolved: 2023-03-31

## 2023-03-31 PROBLEM — L20.9 ATOPIC DERMATITIS: Status: RESOLVED | Noted: 2021-11-29 | Resolved: 2023-03-31

## 2023-03-31 PROBLEM — S43.003A SUBLUXATION OF TENDON OF LONG HEAD OF BICEPS: Status: RESOLVED | Noted: 2022-01-07 | Resolved: 2023-03-31

## 2023-03-31 RX ORDER — ROSUVASTATIN CALCIUM 10 MG/1
TABLET, COATED ORAL
Qty: 90 TABLET | Refills: 1 | Status: SHIPPED | OUTPATIENT
Start: 2023-03-31

## 2023-03-31 RX ORDER — MELOXICAM 15 MG/1
15 TABLET ORAL DAILY
Qty: 90 TABLET | Refills: 1 | Status: SHIPPED | OUTPATIENT
Start: 2023-03-31

## 2023-03-31 RX ORDER — OMEPRAZOLE 20 MG/1
20 CAPSULE, DELAYED RELEASE ORAL
Qty: 60 CAPSULE | Refills: 1 | Status: SHIPPED | OUTPATIENT
Start: 2023-03-31

## 2023-03-31 ASSESSMENT — PATIENT HEALTH QUESTIONNAIRE - PHQ9
SUM OF ALL RESPONSES TO PHQ9 QUESTIONS 1 & 2: 0
2. FEELING DOWN, DEPRESSED OR HOPELESS: 0
1. LITTLE INTEREST OR PLEASURE IN DOING THINGS: 0
SUM OF ALL RESPONSES TO PHQ QUESTIONS 1-9: 0

## 2023-03-31 ASSESSMENT — LIFESTYLE VARIABLES
HOW OFTEN DO YOU HAVE A DRINK CONTAINING ALCOHOL: NEVER
HOW MANY STANDARD DRINKS CONTAINING ALCOHOL DO YOU HAVE ON A TYPICAL DAY: PATIENT DOES NOT DRINK

## 2023-03-31 NOTE — TELEPHONE ENCOUNTER
From: Samara Armstrong  To: Lucrecia Juarez  Sent: 3/31/2023 8:56 AM EDT  Subject: Refills for Rosuvastin    Could you please send refills for Rosuvastin with refills. I will also need refills for the Meloxican. Sorry I forgot to tell you at my appointment     Thank you!     Aayush Ulloa

## 2023-03-31 NOTE — PROGRESS NOTES
Medicare Annual Wellness Visit    Chelita Pratt is here for Medicare AWV    Assessment & Plan   Initial Medicare annual wellness visit      Gastroesophageal reflux disease without esophagitis  - Stable: Medication re-filled as needed, con't medications as prescribed, con't current tx plan  - will change Prilosec to 20 mg BID.   - Patient educated on avoiding trigger food/drinks such as caffeine, soda, chocolate, greasy/fatty, spicy foods.  - Sleep with head of bed elevated, avoid lying flat after eating and avoid restrictive clothing around waist.     Hypertension  - Stable: Medication re-filled as needed, con't medications as prescribed, con't current tx plan  - Continue Cozaar and aspirin as previously prescribed  - Education provided on maintaining a low sodium diet and routine exercise. - Advised to seek emergent tx if SBP>180 and/or DBP>110, any chest pain, h/a or vision changes     Hypercholesterolemia  -  Stable: Medication re-filled as needed, con't medications as prescribed, con't current tx plan  - Lifestyle changes: Decrease fats, sugars, carbohydrates, and increase routine exercise, try to get 150 minutes of aerobic activity a week  - Foods that helps increase HDL include the following: Fish, nuts, flax, avocados, and legumes (such as soy, kidney beans, chickpeas). - Will cont with low fat/chol diet and crestor as ordered. Vitamin D deficiency  - Stable: Medication re-filled as needed, con't medications as prescribed, con't current tx plan  - Continue Vitamin D supplements as ordered, will continue to follow Vitamin D level  - Please increase foods with Vitamin D, which include cheese, eggs, cereals, yogurt, milk, tofu, any type of beef, salmon or tuna,  spinach, and mushroom. - will continue to monitor.       Type I diabetes mellitus with complication (HCC)  - Stable: Medication re-filled as needed, con't medications as prescribed, con't current tx plan  - Continue with Metformin and insulin

## 2023-04-03 ENCOUNTER — HOSPITAL ENCOUNTER (OUTPATIENT)
Dept: PHYSICAL THERAPY | Facility: CLINIC | Age: 68
Setting detail: THERAPIES SERIES
Discharge: HOME | End: 2023-04-03
Payer: MEDICARE

## 2023-04-03 PROCEDURE — 97110 THERAPEUTIC EXERCISES: CPT

## 2023-04-03 NOTE — TELEPHONE ENCOUNTER
Please Approve or Refuse.   Send to Pharmacy per Pt's Request:      Next Visit Date:  8/31/2023   Last Visit Date: 3/31/2023    Hemoglobin A1C (%)   Date Value   11/29/2022 6.6   11/29/2021 6.9   11/26/2021 6.9 (H)             ( goal A1C is < 7)   BP Readings from Last 3 Encounters:   03/31/23 110/68   02/27/23 126/80   02/09/23 (!) 144/82          (goal 120/80)  BUN   Date Value Ref Range Status   02/22/2023 16 8 - 23 mg/dL Final     Creatinine   Date Value Ref Range Status   02/22/2023 0.72 0.50 - 0.90 mg/dL Final     Potassium   Date Value Ref Range Status   02/22/2023 4.7 3.7 - 5.3 mmol/L Final

## 2023-04-03 NOTE — FLOWSHEET NOTE
[x] [address]    Outpatient Rehabilitation &  Therapy  [address] [facility]  P: [phone]  F: [phone]     Physical Therapy Daily Treatment Note    Date:  4/3/2023  Patient Name:  Mackenzie Solis    :  1955  MRN: [id number]  Physician: Terri Pinzon CNP                            Insurance: Medicare (visits BMN)  Medical Diagnosis:   Diagnosis   M54.41 (ICD-10-CM) - Acute right-sided low back pain with right-sided sciatica   Rehab Codes: M54.41, R29.3, M62.81  Onset Date: 22                           Next 's appt.: PRN    Visit# / total visits:      Cancels/No Shows: 0    Subjective:    Pain:  [x] Yes  [] No Location: R LB N/A Pain Rating: (0-10 scale) 0-1/10  Pain altered Tx:  [x] No  [] Yes  Action:  Comments: Pt mentioned that she is starting to feel better. Objective:  Precautions:  Exercises:  Exercise Reps/ Time Weight/ Level Comments    Nustep 10m  L5   x              SB S 30\"x3     x   HS S 30\"x3     x   Piriformis S 30\"x3     x              Bridges 25x2\" Lime   x   Clamshells 25x Lime   x   SL Hip abd 25x Lime   x   TA Sets 10x5\"   x   TA + Marches 10x   x          TG squats/HR 25x L19  x   3-way hip  25x Lime  Flex/abd/ext  x   Pallof Press 20x Red Cord  x   Other:     Specific Instructions for next treatment:  progress (B) hip strength, progress core strength         Treatment Charges: Mins Units   []  Modalities     [x]  Ther Exercise 46 3   []  Manual Therapy     []  Ther Activities     []  Aquatics     []  Vasocompression     []  Other     Total Treatment time 46 3       Assessment: [x] Progressing toward goals. [] No change. [x] Other: Pt able to complete warm up and stretches. Pt requiring mod/max verbal cueing for technique and mechanics with 3 way hip. Able to increase all reps to x25 today with fatigue but no added in soreness. Will continue to work on improvement of core strength and stability.        STG: (to be met in 6

## 2023-04-03 NOTE — PRE-CERTIFICATION NOTE
Medicare Cap   [x] Physical Therapy  [] Speech Therapy  [] Occupational therapy  *PT and Speech caps combine      $2230 Limit for PT and Speech combined  $2230 Limit for OT individually  At the beginning of the month where you expect to go over $2150, please add the [address] [place] [address] modifier      Patient Name: Romana Gilles: 1955    Note:  This is an estimate of charges billed.      Date of [address] Name # units/ charge $$$ charge Daily Total Charge Ongoing Total $$$   3/8/23 Low eval, TE 1,1 97.02+22.29 119.31 119.31   3-13 Ex 3  22.29+19.62+19.62 61.53 180.84   3/16 Ex 3  22.29+19.62+19.62 61.53 242.37   3/20 Ex 3  22.29+19.62+19.62 61.53 303.90   3/23 Ex 3  22.29+19.62+19.62 61.53 365.43   3/27 Ex 3  25.10+19.62+19.62 64.34 429.77   3/30 EX 3  25.10+19.62+19.62 64.34 494.11   4/3 Ex 3   64.34 558.45

## 2023-04-06 ENCOUNTER — HOSPITAL ENCOUNTER (OUTPATIENT)
Dept: PHYSICAL THERAPY | Facility: CLINIC | Age: 68
Setting detail: THERAPIES SERIES
Discharge: HOME | End: 2023-04-06
Payer: MEDICARE

## 2023-04-06 PROCEDURE — 97110 THERAPEUTIC EXERCISES: CPT

## 2023-04-06 NOTE — FLOWSHEET NOTE
treatments)  ? Pain: Patient will report pain as 2/10  ? ROM: Patient will demonstrate lumbar AROM as 100% within all planes in order to increase ease of performing ADLs  Patient will demonstrate equal pelvic alignment   Patient to be independent with home exercise program as demonstrated by performance with correct form without cues  LTG: (to be met in 12 treatments)  Patient will report SMITH as 6% functional impairment in order to indicate improved overall quality of life   Patient will improve (B) LE strength to 5/5 in order to increase ease of performing lifting tasks   Patient will demonstrate good lifting mechanics with use of core and (B) LE activation to reduce tension on low back   Patient will improve (B) SLS to 15 sec in order to improve stability within (B) LE and decrease patient's risk of falling  Patient will report pain as 0/10        Patient goals: \"Lessen pain\"    Pt. Education:  [x] Yes  [] No  [] Reviewed Prior HEP/Ed  Method of Education: [x] Verbal  [] Demo  [] Written  Comprehension of Education:  [x] Verbalizes understanding. [] Demonstrates understanding. [] Needs review. [] Demonstrates/verbalizes HEP/Ed previously given. Access Code: [email or web address]  URL: [email or web address]  Date: 03/30/2023  Prepared by: [address] 3-Way Leg Reach with Resistance at Ankles and Counter Support  - 1 x daily - 7 x weekly - 2 sets - 15 reps  - Side Stepping with Resistance at Feet  - 1 x daily - 7 x weekly - 3 sets  - Supine Transversus Abdominis Bracing - Hands on Ground  - 1 x daily - 7 x weekly - 2 sets - 10 reps - 5sec hold  - Supine March  - 1 x daily - 7 x weekly - 2 sets - 15 reps  - Active Straight Leg Raise with Quad Set  - 1 x daily - 7 x weekly - 2 sets - 15 reps         Plan: [x] Continue with current plan of care towards goals.         Time In: 8:00 am            Time Out: 8:52 am    Electronically signed by:  General Bassam PTA

## 2023-04-06 NOTE — PRE-CERTIFICATION NOTE
Medicare Cap   [x] Physical Therapy  [] Speech Therapy  [] Occupational therapy  *PT and Speech caps combine      $2230 Limit for PT and Speech combined  $2230 Limit for OT individually  At the beginning of the month where you expect to go over $2150, please add the [address] modifier      Patient Name: Heena Tripathi: 1955    Note:  This is an estimate of charges billed.      Date of [address] Name # units/ charge $$$ charge Daily Total Charge Ongoing Total $$$   3/8/23 Low eval, TE 1,1 97.02+22.29 119.31 119.31   3-13 Ex 3  22.29+19.62+19.62 61.53 180.84   3/16 Ex 3  22.29+19.62+19.62 61.53 242.37   3/20 Ex 3  22.29+19.62+19.62 61.53 303.90   3/23 Ex 3  22.29+19.62+19.62 61.53 365.43   3/27 Ex 3  25.10+19.62+19.62 64.34 429.77   3/30 EX 3  25.10+19.62+19.62 64.34 494.11   4/3 Ex 3   64.34 558.45   4/6 Ex 3  25.10+19.62+19.62 64.34 622.79

## 2023-04-18 ENCOUNTER — HOSPITAL ENCOUNTER (OUTPATIENT)
Dept: PHYSICAL THERAPY | Facility: CLINIC | Age: 68
Setting detail: THERAPIES SERIES
Discharge: HOME | End: 2023-04-18
Payer: MEDICARE

## 2023-04-18 PROCEDURE — 97110 THERAPEUTIC EXERCISES: CPT

## 2023-04-18 PROCEDURE — 97530 THERAPEUTIC ACTIVITIES: CPT

## 2023-04-18 NOTE — PRE-CERTIFICATION NOTE
Medicare Cap   [x] Physical Therapy  [] Speech Therapy  [] Occupational therapy  *PT and Speech caps combine      $2230 Limit for PT and Speech combined  $2230 Limit for OT individually  At the beginning of the month where you expect to go over $2150, please add the [address] modifier      Patient Name: Erich Yo: 1955    Note:  This is an estimate of charges billed.      Date of [address] Name # units/ charge $$$ charge Daily Total Charge Ongoing Total $$$   3/8/23 Low eval, TE 1,1 97.02+22.29 119.31 119.31   3-13 Ex 3  22.29+19.62+19.62 61.53 180.84   3/16 Ex 3  22.29+19.62+19.62 61.53 242.37   3/20 Ex 3  22.29+19.62+19.62 61.53 303.90   3/23 Ex 3  22.29+19.62+19.62 61.53 365.43   3/27 Ex 3  25.10+19.62+19.62 64.34 429.77   3/30 EX 3  25.10+19.62+19.62 64.34 494.11   4/3 Ex 3   64.34 558.45   4/6 Ex 3  25.10+19.62+19.62 64.34 622.79   4/11 Ex 3  25.10+19.62+19.62 64.34 687.13   4/13 Ex 3  25.10+19.62+19.62 64.34 751.47   4/18 2 Ex, 1 TA  35.63+22.29+22.29 80.21 831.68

## 2023-04-18 NOTE — DISCHARGE SUMMARY
treatments)  Patient will report MSITH as 6% functional impairment in order to indicate improved overall quality of life Patient reports 8% functional impairment (NOT MET)   Patient will improve (B) LE strength to 5/5 in order to increase ease of performing lifting tasks  (NOT MET)   Patient will demonstrate good lifting mechanics with use of core and (B) LE activation to reduce tension on low back (MET)   Patient will improve (B) SLS to 15 sec in order to improve stability within (B) LE and decrease patient's risk of falling (R) SLS: 30 sec, (L) SLS: 21 sec (MET)   Patient will report pain as 0/10 Patient reports 1/10 pain (NOT MET)         Patient goals: \"Lessen pain\"    Pt. Education:  [x] Yes  [] No  [] Reviewed Prior HEP/Ed  Method of Education: [x] Verbal  [] Demo  [] Written  Comprehension of Education:  [x] Verbalizes understanding. [] Demonstrates understanding. [] Needs review. [] Demonstrates/verbalizes HEP/Ed previously given. Access Code: [email or web address]  URL: [email or web address]  Date: 04/13/2023  Prepared by:  [address]  - 1 x daily - 7 x weekly - 2 sets - 10 reps  - Mini Squat with Counter Support  - 1 x daily - 7 x weekly - 2 sets - 10 reps  - Standing Single Leg Stance with Unilateral Counter Support  - 1 x daily - 7 x weekly - 3 sets - 30sec hold  - Hooklying Heel Walk  - 1 x daily - 7 x weekly - 1-2 sets - 10 reps  - Single Knee to Chest Stretch - 2 x daily - 7 x weekly - 3 sets - 30 - seconds       Plan: [x] Patient to be discharged at this time in order to cont with independent HEP at home         Time In: 8:03 AM            Time Out: 8:45 AM    Electronically signed by:  Mika Auguste PT

## 2023-05-31 DIAGNOSIS — K21.9 GASTROESOPHAGEAL REFLUX DISEASE WITHOUT ESOPHAGITIS: ICD-10-CM

## 2023-05-31 RX ORDER — OMEPRAZOLE 20 MG/1
CAPSULE, DELAYED RELEASE ORAL
Qty: 180 CAPSULE | Refills: 1 | Status: SHIPPED | OUTPATIENT
Start: 2023-05-31

## 2023-07-01 DIAGNOSIS — M17.11 OSTEOARTHRITIS OF RIGHT KNEE, UNSPECIFIED OSTEOARTHRITIS TYPE: ICD-10-CM

## 2023-07-03 RX ORDER — MELOXICAM 15 MG/1
15 TABLET ORAL DAILY
Qty: 90 TABLET | Refills: 1 | Status: SHIPPED | OUTPATIENT
Start: 2023-07-03

## 2023-08-02 DIAGNOSIS — J45.20 MILD INTERMITTENT ASTHMA WITHOUT COMPLICATION: ICD-10-CM

## 2023-08-02 RX ORDER — MONTELUKAST SODIUM 10 MG/1
TABLET ORAL
Qty: 90 TABLET | Refills: 3 | Status: SHIPPED | OUTPATIENT
Start: 2023-08-02

## 2023-08-08 RX ORDER — MIRTAZAPINE 30 MG/1
30 TABLET, FILM COATED ORAL NIGHTLY
Qty: 90 TABLET | Refills: 1 | Status: SHIPPED | OUTPATIENT
Start: 2023-08-08

## 2023-08-08 RX ORDER — ESCITALOPRAM OXALATE 5 MG/1
5 TABLET ORAL NIGHTLY
Qty: 90 TABLET | Refills: 1 | Status: SHIPPED | OUTPATIENT
Start: 2023-08-08

## 2023-08-08 RX ORDER — LOSARTAN POTASSIUM 25 MG/1
25 TABLET ORAL DAILY
Qty: 90 TABLET | Refills: 1 | Status: SHIPPED | OUTPATIENT
Start: 2023-08-08

## 2023-08-30 PROBLEM — E11.40 DIABETIC NEUROPATHY (HCC): Status: ACTIVE | Noted: 2023-08-30

## 2023-08-30 NOTE — PROGRESS NOTES
Shasta Parker, APRN-CNP  [address] [facility]  [address] [address] [facility], [address] [address]  Dept: [phone]  Dept Fax: [phone]     Patient ID: Keith Morgan is a 79 y.o. female Established patient    HPI    Pt here today for f/u on chronic medical problems, go over labs and/or diagnostic studies, and medication refills. Pt denies any fever or chills. Pt today denies any HA, chest pain, or SOB. Pt denies any N/V/D/C or abdominal pain. - she is having a cough for about 2 weeks and reminds her of when she used to get bronchitis when she was younger. She watched the grandkids for 1 week and they were sick when she had them   - has not had retinal exam this year, she goes to Dr. Antoni Grey   - has had improvement with change in prilosec. Otherwise pt doing well on current tx and no other concerns today. The patient's past medical, surgical, social, and family history as well as his current medications and allergies were reviewed as documented in today's encounter by SUSIE Alfaro. Previous office notes, labs, imaging and hospital records were reviewed prior to and during encounter. Current Outpatient Medications on File Prior to Visit   Medication Sig Dispense Refill    meloxicam (MOBIC) 15 MG tablet Take 1 tablet by mouth daily 90 tablet 1    escitalopram (LEXAPRO) 5 MG tablet Take 1 tablet by mouth nightly 90 tablet 1    losartan (COZAAR) 25 MG tablet Take 1 tablet by mouth daily 90 tablet 1    mirtazapine (REMERON) 30 MG tablet Take 1 tablet by mouth nightly 90 tablet 1    montelukast (SINGULAIR) 10 MG tablet TAKE ONE TABLET BY MOUTH ONCE NIGHTLY 90 tablet 3    gabapentin (NEURONTIN) 600 MG tablet Take 2 tablets by mouth nightly for 90 days.  180 tablet 1    omeprazole (PRILOSEC) 20 MG delayed release capsule TAKE ONE CAPSULE BY MOUTH TWICE A DAY BEFORE MEALS 180 capsule 1    metFORMIN (GLUCOPHAGE) 1000 MG tablet Take 1 tablet by mouth 2 times daily

## 2023-08-31 ENCOUNTER — OFFICE VISIT (OUTPATIENT)
Dept: FAMILY MEDICINE CLINIC | Age: 68
End: 2023-08-31
Payer: MEDICARE

## 2023-08-31 VITALS
DIASTOLIC BLOOD PRESSURE: 72 MMHG | HEART RATE: 81 BPM | RESPIRATION RATE: 16 BRPM | SYSTOLIC BLOOD PRESSURE: 100 MMHG | TEMPERATURE: 97 F | BODY MASS INDEX: 27.99 KG/M2 | OXYGEN SATURATION: 96 % | WEIGHT: 158 LBS

## 2023-08-31 DIAGNOSIS — J02.9 ACUTE PHARYNGITIS, UNSPECIFIED ETIOLOGY: ICD-10-CM

## 2023-08-31 DIAGNOSIS — K21.9 GASTROESOPHAGEAL REFLUX DISEASE WITHOUT ESOPHAGITIS: Primary | ICD-10-CM

## 2023-08-31 DIAGNOSIS — Z12.31 SCREENING MAMMOGRAM FOR BREAST CANCER: ICD-10-CM

## 2023-08-31 DIAGNOSIS — E10.41 DIABETIC MONONEUROPATHY ASSOCIATED WITH TYPE 1 DIABETES MELLITUS (HCC): ICD-10-CM

## 2023-08-31 DIAGNOSIS — I10 HYPERTENSION, UNSPECIFIED TYPE: ICD-10-CM

## 2023-08-31 DIAGNOSIS — J45.20 MILD INTERMITTENT ASTHMA WITHOUT COMPLICATION: ICD-10-CM

## 2023-08-31 DIAGNOSIS — E78.00 HYPERCHOLESTEROLEMIA: ICD-10-CM

## 2023-08-31 DIAGNOSIS — E10.8 TYPE I DIABETES MELLITUS WITH COMPLICATION (HCC): ICD-10-CM

## 2023-08-31 DIAGNOSIS — E55.9 VITAMIN D DEFICIENCY: ICD-10-CM

## 2023-08-31 PROCEDURE — G8427 DOCREV CUR MEDS BY ELIG CLIN: HCPCS | Performed by: NURSE PRACTITIONER

## 2023-08-31 PROCEDURE — 1036F TOBACCO NON-USER: CPT | Performed by: NURSE PRACTITIONER

## 2023-08-31 PROCEDURE — 3074F SYST BP LT 130 MM HG: CPT | Performed by: NURSE PRACTITIONER

## 2023-08-31 PROCEDURE — 3046F HEMOGLOBIN A1C LEVEL >9.0%: CPT | Performed by: NURSE PRACTITIONER

## 2023-08-31 PROCEDURE — G8399 PT W/DXA RESULTS DOCUMENT: HCPCS | Performed by: NURSE PRACTITIONER

## 2023-08-31 PROCEDURE — 1090F PRES/ABSN URINE INCON ASSESS: CPT | Performed by: NURSE PRACTITIONER

## 2023-08-31 PROCEDURE — G8417 CALC BMI ABV UP PARAM F/U: HCPCS | Performed by: NURSE PRACTITIONER

## 2023-08-31 PROCEDURE — 2022F DILAT RTA XM EVC RTNOPTHY: CPT | Performed by: NURSE PRACTITIONER

## 2023-08-31 PROCEDURE — 1123F ACP DISCUSS/DSCN MKR DOCD: CPT | Performed by: NURSE PRACTITIONER

## 2023-08-31 PROCEDURE — 3078F DIAST BP <80 MM HG: CPT | Performed by: NURSE PRACTITIONER

## 2023-08-31 PROCEDURE — 99215 OFFICE O/P EST HI 40 MIN: CPT | Performed by: NURSE PRACTITIONER

## 2023-08-31 PROCEDURE — 3017F COLORECTAL CA SCREEN DOC REV: CPT | Performed by: NURSE PRACTITIONER

## 2023-08-31 RX ORDER — AZITHROMYCIN 250 MG/1
250 TABLET, FILM COATED ORAL SEE ADMIN INSTRUCTIONS
Qty: 6 TABLET | Refills: 0 | Status: SHIPPED | OUTPATIENT
Start: 2023-08-31 | End: 2023-08-31

## 2023-08-31 RX ORDER — AZITHROMYCIN 250 MG/1
250 TABLET, FILM COATED ORAL SEE ADMIN INSTRUCTIONS
Qty: 6 TABLET | Refills: 0 | Status: SHIPPED | OUTPATIENT
Start: 2023-08-31 | End: 2023-09-05

## 2023-08-31 ASSESSMENT — ENCOUNTER SYMPTOMS
ABDOMINAL DISTENTION: 0
CONSTIPATION: 0
RHINORRHEA: 0
SINUS PRESSURE: 1
SINUS PAIN: 1
SORE THROAT: 1
CHEST TIGHTNESS: 0
ABDOMINAL PAIN: 0
COUGH: 1
COLOR CHANGE: 0
DIARRHEA: 0
SHORTNESS OF BREATH: 0
NAUSEA: 0
BACK PAIN: 0
VOICE CHANGE: 1

## 2023-09-10 DIAGNOSIS — M17.11 OSTEOARTHRITIS OF RIGHT KNEE, UNSPECIFIED OSTEOARTHRITIS TYPE: ICD-10-CM

## 2023-09-11 RX ORDER — GABAPENTIN 600 MG/1
1200 TABLET ORAL NIGHTLY
Qty: 180 TABLET | Refills: 1 | Status: SHIPPED | OUTPATIENT
Start: 2023-09-11 | End: 2024-03-09

## 2023-09-11 RX ORDER — MELOXICAM 15 MG/1
15 TABLET ORAL DAILY
Qty: 90 TABLET | Refills: 1 | Status: SHIPPED | OUTPATIENT
Start: 2023-09-11

## 2023-09-24 DIAGNOSIS — M17.11 OSTEOARTHRITIS OF RIGHT KNEE, UNSPECIFIED OSTEOARTHRITIS TYPE: ICD-10-CM

## 2023-09-24 DIAGNOSIS — E78.00 HYPERCHOLESTEROLEMIA: ICD-10-CM

## 2023-09-25 RX ORDER — MELOXICAM 15 MG/1
15 TABLET ORAL DAILY
Qty: 90 TABLET | Refills: 1 | Status: SHIPPED | OUTPATIENT
Start: 2023-09-25

## 2023-09-25 RX ORDER — ROSUVASTATIN CALCIUM 10 MG/1
TABLET, COATED ORAL
Qty: 90 TABLET | Refills: 1 | OUTPATIENT
Start: 2023-09-25

## 2023-09-25 RX ORDER — ROSUVASTATIN CALCIUM 10 MG/1
TABLET, COATED ORAL
Qty: 90 TABLET | Refills: 1 | Status: SHIPPED | OUTPATIENT
Start: 2023-09-25

## 2023-09-29 NOTE — TELEPHONE ENCOUNTER
Please Approve or Refuse.   Send to Pharmacy per Pt's Request:      Next Visit Date:  2/29/2024   Last Visit Date: 8/31/2023    Hemoglobin A1C (%)   Date Value   11/29/2022 6.6   11/29/2021 6.9   11/26/2021 6.9 (H)             ( goal A1C is < 7)   BP Readings from Last 3 Encounters:   08/31/23 100/72   03/31/23 110/68   02/27/23 126/80          (goal 120/80)  BUN   Date Value Ref Range Status   02/22/2023 16 8 - 23 mg/dL Final     Creatinine   Date Value Ref Range Status   02/22/2023 0.72 0.50 - 0.90 mg/dL Final     Potassium   Date Value Ref Range Status   02/22/2023 4.7 3.7 - 5.3 mmol/L Final

## 2023-10-16 ENCOUNTER — HOSPITAL ENCOUNTER (OUTPATIENT)
Dept: MAMMOGRAPHY | Age: 68
Discharge: HOME | End: 2023-10-18
Payer: MEDICARE

## 2023-10-16 VITALS — BODY MASS INDEX: 27.49 KG/M2 | HEIGHT: 65 IN | WEIGHT: 165 LBS

## 2023-10-16 DIAGNOSIS — Z12.31 SCREENING MAMMOGRAM FOR BREAST CANCER: ICD-10-CM

## 2023-10-16 PROCEDURE — 77063 BREAST TOMOSYNTHESIS BI: CPT

## 2023-11-20 RX ORDER — GABAPENTIN 600 MG/1
1200 TABLET ORAL NIGHTLY
Qty: 180 TABLET | Refills: 1 | Status: SHIPPED | OUTPATIENT
Start: 2023-11-20 | End: 2024-05-18

## 2023-11-26 DIAGNOSIS — K21.9 GASTROESOPHAGEAL REFLUX DISEASE WITHOUT ESOPHAGITIS: ICD-10-CM

## 2023-11-27 ENCOUNTER — E-VISIT (OUTPATIENT)
Dept: FAMILY MEDICINE CLINIC | Age: 68
End: 2023-11-27
Payer: MEDICARE

## 2023-11-27 DIAGNOSIS — J01.90 ACUTE BACTERIAL SINUSITIS: Primary | ICD-10-CM

## 2023-11-27 DIAGNOSIS — B96.89 ACUTE BACTERIAL SINUSITIS: Primary | ICD-10-CM

## 2023-11-27 PROCEDURE — 99422 OL DIG E/M SVC 11-20 MIN: CPT | Performed by: NURSE PRACTITIONER

## 2023-11-27 RX ORDER — AZITHROMYCIN 250 MG/1
250 TABLET, FILM COATED ORAL SEE ADMIN INSTRUCTIONS
Qty: 6 TABLET | Refills: 0 | Status: SHIPPED | OUTPATIENT
Start: 2023-11-27 | End: 2023-12-02

## 2023-11-27 RX ORDER — OMEPRAZOLE 20 MG/1
CAPSULE, DELAYED RELEASE ORAL
Qty: 180 CAPSULE | Refills: 1 | Status: SHIPPED | OUTPATIENT
Start: 2023-11-27

## 2023-11-27 ASSESSMENT — LIFESTYLE VARIABLES: SMOKING_STATUS: NO, I'VE NEVER SMOKED

## 2023-11-27 NOTE — PROGRESS NOTES
HPI: as per patient provided history  Exam: N/A (electronic visit)  ASSESSMENT/PLAN:  Acute bacterial sinusitis  - start azithromycin as prescribed. - use Flonase and zyrtec or Claritin daily. - rest and increase fluid intake. - call if symptoms worsen or do not improve in 10 days. - further recommendations depending on symptoms: To help relieve your symptoms, I suggest the following over-the-counter treatments: For fevers or pain: acetaminophen (Tylenol) or ibuprofen (Advil, Motrin) or naproxen (Aleve)    For dry cough: medications containing dextromethorphan, such as Delsym, Robitussin DM or Mucinex DM and medicated throat lozenges    For congestion or sinus pressure: medications containing guaifenesin to help break up mucus, such as Mucinex or Robitussin, medications containing pseudoephedrine or phenylephrine, such as Sudafed, nasal steroid sprays, such as Flonase, Sensimist, Rhinocort or Nasonex and saline nasal sprays, neti pot or sinus rinse bottle    For runny nose, sneezing or watery/itchy eyes: less sedating antihistamines, such as loratidine (Claritin), fexofenadine (Allegra) or Cetirizine (Zyrtec)       Patient instructed to call the office if worsens, or fails to improve as anticipated. 11-20 minutes were spent on the digital evaluation and management of this patient.

## 2024-01-23 ENCOUNTER — TELEMEDICINE (OUTPATIENT)
Dept: FAMILY MEDICINE CLINIC | Age: 69
End: 2024-01-23
Payer: MEDICARE

## 2024-01-23 DIAGNOSIS — R05.1 ACUTE COUGH: ICD-10-CM

## 2024-01-23 DIAGNOSIS — J01.90 ACUTE BACTERIAL SINUSITIS: Primary | ICD-10-CM

## 2024-01-23 DIAGNOSIS — B96.89 ACUTE BACTERIAL SINUSITIS: Primary | ICD-10-CM

## 2024-01-23 PROCEDURE — 99213 OFFICE O/P EST LOW 20 MIN: CPT | Performed by: NURSE PRACTITIONER

## 2024-01-23 PROCEDURE — G8427 DOCREV CUR MEDS BY ELIG CLIN: HCPCS | Performed by: NURSE PRACTITIONER

## 2024-01-23 PROCEDURE — 3017F COLORECTAL CA SCREEN DOC REV: CPT | Performed by: NURSE PRACTITIONER

## 2024-01-23 PROCEDURE — 1090F PRES/ABSN URINE INCON ASSESS: CPT | Performed by: NURSE PRACTITIONER

## 2024-01-23 PROCEDURE — 1123F ACP DISCUSS/DSCN MKR DOCD: CPT | Performed by: NURSE PRACTITIONER

## 2024-01-23 PROCEDURE — G8399 PT W/DXA RESULTS DOCUMENT: HCPCS | Performed by: NURSE PRACTITIONER

## 2024-01-23 RX ORDER — AZITHROMYCIN 250 MG/1
250 TABLET, FILM COATED ORAL SEE ADMIN INSTRUCTIONS
Qty: 6 TABLET | Refills: 0 | Status: SHIPPED | OUTPATIENT
Start: 2024-01-23 | End: 2024-01-28

## 2024-01-23 RX ORDER — BENZONATATE 100 MG/1
100 CAPSULE ORAL 3 TIMES DAILY PRN
Qty: 30 CAPSULE | Refills: 0 | Status: SHIPPED | OUTPATIENT
Start: 2024-01-23 | End: 2024-02-02

## 2024-01-23 ASSESSMENT — PATIENT HEALTH QUESTIONNAIRE - PHQ9
SUM OF ALL RESPONSES TO PHQ QUESTIONS 1-9: 0
1. LITTLE INTEREST OR PLEASURE IN DOING THINGS: 0
2. FEELING DOWN, DEPRESSED OR HOPELESS: 0
SUM OF ALL RESPONSES TO PHQ9 QUESTIONS 1 & 2: 0

## 2024-01-23 ASSESSMENT — ENCOUNTER SYMPTOMS
ABDOMINAL DISTENTION: 0
BACK PAIN: 0
SORE THROAT: 1
COUGH: 1
NAUSEA: 0
SHORTNESS OF BREATH: 0
CONSTIPATION: 0
SINUS PAIN: 1
CHEST TIGHTNESS: 0
RHINORRHEA: 1
ABDOMINAL PAIN: 0
COLOR CHANGE: 0

## 2024-01-23 NOTE — PROGRESS NOTES
completed due to virtual visit.     Constitutional: [x] Appears well-developed and well-nourished [x] No apparent distress                            [] Abnormal-   Mental status  [x] Alert and awake  [x] Oriented to person/place/time [x]Able to follow commands       Eyes:  EOM    [x]  Normal  [] Abnormal-  Sclera  [x]  Normal  [] Abnormal -         Discharge [x]  None visible  [] Abnormal -     HENT:   [x] Normocephalic, atraumatic.  [] Abnormal   [x] Mouth/Throat: Mucous membranes are moist.      External Ears [x] Normal  [] Abnormal-      Neck: [x] No visualized mass      Pulmonary/Chest: [x] Respiratory effort normal.  [x] No visualized signs of difficulty breathing or respiratory distress        [] Abnormal-      Neurological:        [x] No Facial Asymmetry (Cranial nerve 7 motor function) (limited exam to video visit)                       [x] No gaze palsy        [] Abnormal-         Skin:                     [x] No significant exanthematous lesions or discoloration noted on facial skin         [] Abnormal-                                  Psychiatric:           [x] Normal Affect [x] No Hallucinations        [] Abnormal-      Other pertinent observable physical exam findings- Patient appears generally well, is speaking full sentences clearly without any observable SOB, no cough, no diaphoresis.   Pt stable during video, does sound congested and c/o sinus pressure to maxillary and frontal sinuses.  Assessment:      Diagnosis Orders   1. Acute bacterial sinusitis  azithromycin (ZITHROMAX) 250 MG tablet    benzonatate (TESSALON) 100 MG capsule      2. Acute cough            Plan:     Acute bacterial sinusitis  Acute cough  - start azithromycin as prescribed.  - tessalon perles as needed for the cough.   - encouraged to use Flonase daily.  -Increase fluids, rest, call the office if s/s do not improve within 7 days or worsen at anytime, thorough hand washing techniques recommended, patient verbalized

## 2024-01-28 DIAGNOSIS — J45.20 MILD INTERMITTENT ASTHMA WITHOUT COMPLICATION: ICD-10-CM

## 2024-01-28 DIAGNOSIS — E78.00 HYPERCHOLESTEROLEMIA: ICD-10-CM

## 2024-01-28 DIAGNOSIS — M17.11 OSTEOARTHRITIS OF RIGHT KNEE, UNSPECIFIED OSTEOARTHRITIS TYPE: ICD-10-CM

## 2024-01-28 DIAGNOSIS — B00.1 HERPES LABIALIS: ICD-10-CM

## 2024-01-29 RX ORDER — MELOXICAM 15 MG/1
15 TABLET ORAL DAILY
Qty: 90 TABLET | Refills: 1 | Status: SHIPPED | OUTPATIENT
Start: 2024-01-29

## 2024-01-29 RX ORDER — MIRTAZAPINE 30 MG/1
30 TABLET, FILM COATED ORAL NIGHTLY
Qty: 90 TABLET | Refills: 1 | Status: SHIPPED | OUTPATIENT
Start: 2024-01-29

## 2024-01-29 RX ORDER — ESCITALOPRAM OXALATE 5 MG/1
5 TABLET ORAL NIGHTLY
Qty: 90 TABLET | Refills: 1 | OUTPATIENT
Start: 2024-01-29

## 2024-01-29 RX ORDER — ACYCLOVIR 400 MG/1
400 TABLET ORAL 2 TIMES DAILY
Qty: 180 TABLET | Refills: 1 | Status: SHIPPED | OUTPATIENT
Start: 2024-01-29

## 2024-01-29 RX ORDER — ROSUVASTATIN CALCIUM 10 MG/1
10 TABLET, COATED ORAL DAILY
Qty: 90 TABLET | Refills: 1 | Status: SHIPPED | OUTPATIENT
Start: 2024-01-29

## 2024-01-29 RX ORDER — MONTELUKAST SODIUM 10 MG/1
10 TABLET ORAL NIGHTLY
Qty: 90 TABLET | Refills: 3 | Status: SHIPPED | OUTPATIENT
Start: 2024-01-29 | End: 2025-01-23

## 2024-01-29 RX ORDER — ESCITALOPRAM OXALATE 5 MG/1
5 TABLET ORAL NIGHTLY
Qty: 90 TABLET | Refills: 1 | Status: SHIPPED | OUTPATIENT
Start: 2024-01-29

## 2024-01-29 RX ORDER — MIRTAZAPINE 30 MG/1
30 TABLET, FILM COATED ORAL NIGHTLY
Qty: 90 TABLET | Refills: 1 | OUTPATIENT
Start: 2024-01-29

## 2024-02-05 RX ORDER — ESCITALOPRAM OXALATE 5 MG/1
5 TABLET ORAL NIGHTLY
Qty: 90 TABLET | Refills: 1 | Status: SHIPPED | OUTPATIENT
Start: 2024-02-05

## 2024-02-05 RX ORDER — LOSARTAN POTASSIUM 25 MG/1
25 TABLET ORAL DAILY
Qty: 90 TABLET | Refills: 1 | Status: SHIPPED | OUTPATIENT
Start: 2024-02-05

## 2024-02-05 NOTE — PROGRESS NOTES
Dermatology Patient Note  [facility] DERMATOLOGY  [address]  [address]  [facility] [address]  Dept: [phone]  Dept Fax: [phone]      VISITDATE: 2/12/2024   REFERRING PROVIDER: No ref. provider found      Marianna Fraser is a 68 y.o. female  who presents today in the office for:    1 Year Follow Up (FBSE, lesion on RT pinkie finger and Lt side of scalp near forehead. Pt  stated she picks at the one on her head. Pt has not put anything on them. )      HISTORY OF PRESENT ILLNESS:  Patient presents for 1 year FBSC. At  she had a 2 biopsies done of the right dorsal third finger demonstrating LSC and right upper arm demonstrating LK. No further treatment was required. She was also started on triamcinolone for dermatitis of arms.     At today's visit, the patient reports a lesion on the right pinky finger which is itchy and has been present for a few months. She also complains of two spots on her scalp and left arm which are itchy and bothersome. She has two warts on her right pinky finger and right arm. She has a mole on her left breast which is unchanged in size or appearance.     MEDICAL PROBLEMS:  Patient Active Problem List    Diagnosis Date Noted    Mild intermittent asthma without complication 02/27/2023     Priority: Medium    Type I diabetes mellitus with complication (HCC) 05/19/2021     Priority: Medium    Diabetic neuropathy (HCC) 08/30/2023    Osteoarthritis of right knee 05/19/2021    Vitamin D deficiency 04/11/2021    Gastroesophageal reflux disease without esophagitis 04/11/2021    On statin therapy 03/02/2021    Hypercholesterolemia 03/02/2021       CURRENT MEDICATIONS:   Current Outpatient Medications   Medication Sig Dispense Refill    losartan (COZAAR) 25 MG tablet TAKE 1 TABLET BY MOUTH DAILY 90 tablet 1    escitalopram (LEXAPRO) 5 MG tablet TAKE ONE TABLET BY MOUTH ONCE NIGHTLY 90 tablet 1    meloxicam (MOBIC) 15 MG

## 2024-02-12 ENCOUNTER — OFFICE VISIT (OUTPATIENT)
Dept: DERMATOLOGY | Age: 69
End: 2024-02-12
Payer: MEDICARE

## 2024-02-12 VITALS
HEIGHT: 65 IN | BODY MASS INDEX: 26.99 KG/M2 | OXYGEN SATURATION: 98 % | DIASTOLIC BLOOD PRESSURE: 82 MMHG | SYSTOLIC BLOOD PRESSURE: 121 MMHG | WEIGHT: 162 LBS | HEART RATE: 77 BPM

## 2024-02-12 DIAGNOSIS — L82.0 INFLAMED SEBORRHEIC KERATOSIS: ICD-10-CM

## 2024-02-12 DIAGNOSIS — B07.9 VERRUCA VULGARIS: ICD-10-CM

## 2024-02-12 DIAGNOSIS — Z80.8 FAMILY HISTORY OF MELANOMA: Primary | ICD-10-CM

## 2024-02-12 DIAGNOSIS — L82.1 SEBORRHEIC KERATOSES: ICD-10-CM

## 2024-02-12 DIAGNOSIS — D22.9 MULTIPLE NEVI: ICD-10-CM

## 2024-02-12 DIAGNOSIS — L57.0 ACTINIC KERATOSES: ICD-10-CM

## 2024-02-12 DIAGNOSIS — D18.01 CHERRY ANGIOMA: ICD-10-CM

## 2024-02-12 PROCEDURE — G8484 FLU IMMUNIZE NO ADMIN: HCPCS | Performed by: DERMATOLOGY

## 2024-02-12 PROCEDURE — 17000 DESTRUCT PREMALG LESION: CPT | Performed by: DERMATOLOGY

## 2024-02-12 PROCEDURE — G8417 CALC BMI ABV UP PARAM F/U: HCPCS | Performed by: DERMATOLOGY

## 2024-02-12 PROCEDURE — G8399 PT W/DXA RESULTS DOCUMENT: HCPCS | Performed by: DERMATOLOGY

## 2024-02-12 PROCEDURE — 3017F COLORECTAL CA SCREEN DOC REV: CPT | Performed by: DERMATOLOGY

## 2024-02-12 PROCEDURE — G8427 DOCREV CUR MEDS BY ELIG CLIN: HCPCS | Performed by: DERMATOLOGY

## 2024-02-12 PROCEDURE — 1123F ACP DISCUSS/DSCN MKR DOCD: CPT | Performed by: DERMATOLOGY

## 2024-02-12 PROCEDURE — 1036F TOBACCO NON-USER: CPT | Performed by: DERMATOLOGY

## 2024-02-12 PROCEDURE — 17110 DESTRUCTION B9 LES UP TO 14: CPT | Performed by: DERMATOLOGY

## 2024-02-12 PROCEDURE — 99213 OFFICE O/P EST LOW 20 MIN: CPT | Performed by: DERMATOLOGY

## 2024-02-12 PROCEDURE — 1090F PRES/ABSN URINE INCON ASSESS: CPT | Performed by: DERMATOLOGY

## 2024-02-12 NOTE — PATIENT INSTRUCTIONS
Sun Protection     There are two types of sun rays that are harmful to the skin.  UVA rays cause skin aging and skin cancer, such as melanoma.  UVB rays cause sunburns, cataracts, and also contribute to skin cancer.    The American-Academy of Dermatology recommends that children and adults wear a broad spectrum, waterproof sunscreen with a Sun Protection Factor (SPF) of 30 or higher.  It is important to check the ingredient label to be sure the sunscreen will protect the skin from both UVA and UVB sunrays.  Your sunscreen should contain at least one of the following ingredients: titanium dioxide, zinc oxide, or avobenzone.    Sunscreen will not be effective unless it is applied to all exposed skin.  Sunscreens work best if they are applied 30 minutes before sun exposure.  They should be reapplied every 2 hours and after any water exposure.    Sunscreen is not perfect.  It is important to use other methods to protect the skin from sun exposure also.  Wear hats, sunglasses and other sun protective clothing when outdoors.  Stay in the shade during the peak hours of sun exposure between 10 AM and 4 PM. , Moles    Moles, or nevi, are very common. Moles are areas of the skin where there are more cells called melanocytes. Melanocytes are the cells in the body that produce pigment, or color. Moles can be many colors including skin-tone, pink, tan, brown, and very dark brown to black. Moles can be raised or flat. Moles can have hair. Moles can grow on any skin surface, including the scalp, hands and feet. When someone is born with a mole, or develops one in the first months of life, the mole is called a congenital, or birthmark mole. About 1 in 100 people are born with one or more moles. Most people develop their moles later in childhood or adulthood. These are called acquired moles. They are most common on sun exposed areas of skin such as the face, neck, upper body, arms and legs.    CHECKING MOLES  Most moles are

## 2024-02-28 PROBLEM — Z79.899 ON STATIN THERAPY: Status: RESOLVED | Noted: 2021-03-02 | Resolved: 2024-02-28

## 2024-02-28 ASSESSMENT — ENCOUNTER SYMPTOMS
CONSTIPATION: 0
COLOR CHANGE: 0
SINUS PAIN: 0
COUGH: 0
ABDOMINAL PAIN: 0
BACK PAIN: 0
CHEST TIGHTNESS: 0
SHORTNESS OF BREATH: 0
RHINORRHEA: 0
VOICE CHANGE: 0
NAUSEA: 0
ABDOMINAL DISTENTION: 0
SINUS PRESSURE: 0
SORE THROAT: 0

## 2024-02-28 NOTE — PROGRESS NOTES
ext   - reassurance and education    Multiple nevi  - Clinically and dermatoscopically benign on exam today.  - Common nevi have a low individual risk of developing into melanoma. Patients with >50 nevi have a greater risk of developing melanoma in their lifetime and should undergo skin checks at least annually.  - I recommended the patient apply broad spectrum spf 30+ sunscreen daily, reapplying every 2 hours  - In additional to regular use of sunscreen, I recommended broad-rimmed hats, long sleeves, and seeking the shade.    Family history of melanoma  - patient counseled on importance of yearly skin checks        RTC ***    Future Appointments   Date Time Provider Department Center   2/29/2024  7:15 AM E[facility] - [facility]NP [facility]   3/4/2024  8:00 AM Emeli Kamara MD  derm [facility]     I, Thais Briggs, personally scribed the services dictated to me by Dr. Kamara in this documentation.     There are no Patient Instructions on file for this visit.        Electronically signed by Emeli Kamara MD on 2/28/24 at 1:17 PM EST

## 2024-02-28 NOTE — PROGRESS NOTES
Emy Boucher, RAMON-CNP  [facility] MEDICINE  [address], [address]  [facility] [place] [facility] [address]  Dept: [phone]  Dept Fax: [phone]     Patient ID: Marianna Fraser is a 68 y.o. female Established patient    HPI    Pt here today for f/u on chronic medical problems, go over labs and/or diagnostic studies, and medication refills. Pt denies any fever or chills.  Pt today denies any HA, chest pain, or SOB.  Pt denies any N/V/D/C or abdominal pain.    - she is having diarrhea that comes and goes and sometimes it seems like right after eating she is having to run and go. Heart burn has been an ongoing issue. She has had improvement with change in prilosec.   - due for eye exam in May still seeing Dr. Calzada  - she is watching her 2 year old grandson  - wanting to lose weight, she has been walking and trying to eat better, she is going to [employer] in May and she knows if she loses some weight it will help with her knee pain     Otherwise pt doing well on current tx and no other concerns today.     The patient's past medical, surgical, social, and family history as well as his current medications and allergies were reviewed as documented in today's encounter by SUSIE Marc.      Previous office notes, labs, imaging and hospital records were reviewed prior to and during encounter.    Current Outpatient Medications on File Prior to Visit   Medication Sig Dispense Refill    losartan (COZAAR) 25 MG tablet TAKE 1 TABLET BY MOUTH DAILY 90 tablet 1    escitalopram (LEXAPRO) 5 MG tablet TAKE ONE TABLET BY MOUTH ONCE NIGHTLY 90 tablet 1    meloxicam (MOBIC) 15 MG tablet Take 1 tablet by mouth daily 90 tablet 1    acyclovir (ZOVIRAX) 400 MG tablet Take 1 tablet by mouth 2 times daily 180 tablet 1    montelukast (SINGULAIR) 10 MG tablet Take 1 tablet by mouth nightly 90 tablet 3    rosuvastatin (CRESTOR) 10 MG tablet Take 1 tablet by mouth daily 90 tablet 1    mirtazapine (REMERON) 30 MG

## 2024-02-29 ENCOUNTER — OFFICE VISIT (OUTPATIENT)
Dept: FAMILY MEDICINE CLINIC | Age: 69
End: 2024-02-29

## 2024-02-29 ENCOUNTER — HOSPITAL ENCOUNTER (OUTPATIENT)
Age: 69
Setting detail: SPECIMEN
Discharge: HOME | End: 2024-02-29

## 2024-02-29 VITALS
OXYGEN SATURATION: 96 % | DIASTOLIC BLOOD PRESSURE: 70 MMHG | SYSTOLIC BLOOD PRESSURE: 112 MMHG | HEART RATE: 79 BPM | BODY MASS INDEX: 26.96 KG/M2 | RESPIRATION RATE: 16 BRPM | TEMPERATURE: 97 F | WEIGHT: 162 LBS

## 2024-02-29 DIAGNOSIS — R63.5 WEIGHT GAIN: ICD-10-CM

## 2024-02-29 DIAGNOSIS — E10.41 DIABETIC MONONEUROPATHY ASSOCIATED WITH TYPE 1 DIABETES MELLITUS (HCC): ICD-10-CM

## 2024-02-29 DIAGNOSIS — E55.9 VITAMIN D DEFICIENCY: ICD-10-CM

## 2024-02-29 DIAGNOSIS — E10.8 TYPE I DIABETES MELLITUS WITH COMPLICATION (HCC): ICD-10-CM

## 2024-02-29 DIAGNOSIS — E78.00 HYPERCHOLESTEROLEMIA: ICD-10-CM

## 2024-02-29 DIAGNOSIS — I10 HYPERTENSION, UNSPECIFIED TYPE: ICD-10-CM

## 2024-02-29 DIAGNOSIS — R10.11 RIGHT UPPER QUADRANT ABDOMINAL PAIN: ICD-10-CM

## 2024-02-29 DIAGNOSIS — K21.9 GASTROESOPHAGEAL REFLUX DISEASE WITHOUT ESOPHAGITIS: Primary | ICD-10-CM

## 2024-02-29 DIAGNOSIS — J45.20 MILD INTERMITTENT ASTHMA WITHOUT COMPLICATION: ICD-10-CM

## 2024-02-29 LAB
25(OH)D3 SERPL-MCNC: 105 NG/ML (ref 30–100)
ALBUMIN SERPL-MCNC: 4.7 G/DL (ref 3.5–5.2)
ALBUMIN/GLOB SERPL: 2 {RATIO} (ref 1–2.5)
ALP SERPL-CCNC: 96 U/L (ref 35–104)
ALT SERPL-CCNC: 22 U/L (ref 10–35)
ANION GAP SERPL CALCULATED.3IONS-SCNC: 11 MMOL/L (ref 9–16)
AST SERPL-CCNC: 25 U/L (ref 10–35)
BASOPHILS # BLD: 0.07 K/UL (ref 0–0.2)
BASOPHILS NFR BLD: 1 % (ref 0–2)
BILIRUB SERPL-MCNC: 0.5 MG/DL (ref 0–1.2)
BUN SERPL-MCNC: 20 MG/DL (ref 8–23)
CALCIUM SERPL-MCNC: 10.3 MG/DL (ref 8.6–10.4)
CHLORIDE SERPL-SCNC: 104 MMOL/L (ref 98–107)
CHOLEST SERPL-MCNC: 152 MG/DL (ref 0–199)
CHOLESTEROL/HDL RATIO: 3
CO2 SERPL-SCNC: 24 MMOL/L (ref 20–31)
CREAT SERPL-MCNC: 0.9 MG/DL (ref 0.5–0.9)
EOSINOPHIL # BLD: 0.26 K/UL (ref 0–0.44)
EOSINOPHILS RELATIVE PERCENT: 4 % (ref 1–4)
ERYTHROCYTE [DISTWIDTH] IN BLOOD BY AUTOMATED COUNT: 12.9 % (ref 11.8–14.4)
GFR SERPL CREATININE-BSD FRML MDRD: >60 ML/MIN/1.73M2
GLUCOSE SERPL-MCNC: 107 MG/DL (ref 74–99)
HCT VFR BLD AUTO: 43.7 % (ref 36.3–47.1)
HDLC SERPL-MCNC: 58 MG/DL
HGB BLD-MCNC: 13.8 G/DL (ref 11.9–15.1)
IMM GRANULOCYTES # BLD AUTO: <0.03 K/UL (ref 0–0.3)
IMM GRANULOCYTES NFR BLD: 0 %
LDLC SERPL CALC-MCNC: 61 MG/DL (ref 0–100)
LYMPHOCYTES NFR BLD: 1.91 K/UL (ref 1.1–3.7)
LYMPHOCYTES RELATIVE PERCENT: 31 % (ref 24–43)
MCH RBC QN AUTO: 30.1 PG (ref 25.2–33.5)
MCHC RBC AUTO-ENTMCNC: 31.6 G/DL (ref 28.4–34.8)
MCV RBC AUTO: 95.4 FL (ref 82.6–102.9)
MONOCYTES NFR BLD: 0.45 K/UL (ref 0.1–1.2)
MONOCYTES NFR BLD: 7 % (ref 3–12)
NEUTROPHILS NFR BLD: 57 % (ref 36–65)
NEUTS SEG NFR BLD: 3.47 K/UL (ref 1.5–8.1)
NRBC BLD-RTO: 0 PER 100 WBC
PLATELET # BLD AUTO: 264 K/UL (ref 138–453)
PMV BLD AUTO: 10.1 FL (ref 8.1–13.5)
POTASSIUM SERPL-SCNC: 5.2 MMOL/L (ref 3.7–5.3)
PROT SERPL-MCNC: 7.2 G/DL (ref 6.6–8.7)
RBC # BLD AUTO: 4.58 M/UL (ref 3.95–5.11)
SODIUM SERPL-SCNC: 139 MMOL/L (ref 136–145)
TRIGL SERPL-MCNC: 164 MG/DL
VLDLC SERPL CALC-MCNC: 33 MG/DL
WBC OTHER # BLD: 6.2 K/UL (ref 3.5–11.3)

## 2024-02-29 SDOH — ECONOMIC STABILITY: FOOD INSECURITY: WITHIN THE PAST 12 MONTHS, THE FOOD YOU BOUGHT JUST DIDN'T LAST AND YOU DIDN'T HAVE MONEY TO GET MORE.: NEVER TRUE

## 2024-02-29 SDOH — ECONOMIC STABILITY: INCOME INSECURITY: HOW HARD IS IT FOR YOU TO PAY FOR THE VERY BASICS LIKE FOOD, HOUSING, MEDICAL CARE, AND HEATING?: NOT HARD AT ALL

## 2024-02-29 SDOH — ECONOMIC STABILITY: FOOD INSECURITY: WITHIN THE PAST 12 MONTHS, YOU WORRIED THAT YOUR FOOD WOULD RUN OUT BEFORE YOU GOT MONEY TO BUY MORE.: NEVER TRUE

## 2024-02-29 ASSESSMENT — ENCOUNTER SYMPTOMS: DIARRHEA: 1

## 2024-03-04 ENCOUNTER — OFFICE VISIT (OUTPATIENT)
Dept: DERMATOLOGY | Age: 69
End: 2024-03-04
Payer: MEDICARE

## 2024-03-04 VITALS
OXYGEN SATURATION: 98 % | WEIGHT: 162 LBS | DIASTOLIC BLOOD PRESSURE: 85 MMHG | BODY MASS INDEX: 26.99 KG/M2 | HEART RATE: 78 BPM | TEMPERATURE: 97.3 F | SYSTOLIC BLOOD PRESSURE: 139 MMHG | HEIGHT: 65 IN

## 2024-03-04 DIAGNOSIS — L98.9 SKIN EROSION: ICD-10-CM

## 2024-03-04 DIAGNOSIS — J01.90 ACUTE BACTERIAL SINUSITIS: ICD-10-CM

## 2024-03-04 DIAGNOSIS — R05.1 ACUTE COUGH: ICD-10-CM

## 2024-03-04 DIAGNOSIS — B96.89 ACUTE BACTERIAL SINUSITIS: ICD-10-CM

## 2024-03-04 DIAGNOSIS — B07.9 VERRUCA VULGARIS: Primary | ICD-10-CM

## 2024-03-04 PROCEDURE — 17110 DESTRUCTION B9 LES UP TO 14: CPT | Performed by: DERMATOLOGY

## 2024-03-04 NOTE — PROGRESS NOTES
pinky finger due to picking        Electronically signed by Emeli Kamara MD on 2/28/24 at 1:17 PM EST

## 2024-03-04 NOTE — PATIENT INSTRUCTIONS
Cryotherapy    Liquid Nitrogen - \"freeze\" (Cryotherapy)  Your doctor has treated your skin lesions with a very cold substance.  The liquid nitrogen is so cold that it may feel like the skin is burning during application.  A clear blister or blood blister may form after treatment and may later form a scab.  Leave the area alone.  Usually this scab will fall of within 1-2 weeks.  The area should be kept clean and can be covered with Vaseline and a Band-Aid if needed. If a large blister develops it is ok to use a clean needle to gently pop the blister. Please call our office with any concerns at [phone].    - start mupirocin 2% ointment, apply to wart on right pinky finger due to picking

## 2024-03-05 RX ORDER — BENZONATATE 100 MG/1
CAPSULE ORAL
Qty: 30 CAPSULE | Refills: 0 | OUTPATIENT
Start: 2024-03-05

## 2024-03-05 RX ORDER — AZITHROMYCIN 250 MG/1
TABLET, FILM COATED ORAL
Qty: 6 TABLET | Refills: 0 | OUTPATIENT
Start: 2024-03-05

## 2024-03-07 ENCOUNTER — OFFICE VISIT (OUTPATIENT)
Dept: FAMILY MEDICINE CLINIC | Age: 69
End: 2024-03-07

## 2024-03-07 VITALS
BODY MASS INDEX: 26.63 KG/M2 | RESPIRATION RATE: 16 BRPM | WEIGHT: 160 LBS | OXYGEN SATURATION: 98 % | TEMPERATURE: 97 F | DIASTOLIC BLOOD PRESSURE: 88 MMHG | SYSTOLIC BLOOD PRESSURE: 126 MMHG | HEART RATE: 67 BPM

## 2024-03-07 DIAGNOSIS — R63.5 WEIGHT GAIN: Primary | ICD-10-CM

## 2024-03-07 DIAGNOSIS — E55.9 VITAMIN D DEFICIENCY: ICD-10-CM

## 2024-03-07 RX ORDER — PHENTERMINE HYDROCHLORIDE 37.5 MG/1
37.5 TABLET ORAL
Qty: 30 TABLET | Refills: 0 | Status: SHIPPED | OUTPATIENT
Start: 2024-03-07 | End: 2024-04-06

## 2024-03-07 ASSESSMENT — ENCOUNTER SYMPTOMS
CONSTIPATION: 0
DIARRHEA: 0
COLOR CHANGE: 0
BACK PAIN: 0
RHINORRHEA: 0
SORE THROAT: 0
CHEST TIGHTNESS: 0
ABDOMINAL PAIN: 0
ABDOMINAL DISTENTION: 0
COUGH: 0
SHORTNESS OF BREATH: 0
NAUSEA: 0

## 2024-03-07 NOTE — PROGRESS NOTES
Emy Boucher, APRN-CNP  [facility]  [address], [address]  [facility] [address]  Dept: [phone]  Dept Fax: [phone]     Patient ID: Marianna Fraser is a 68 y.o. female Established patient    HPI    Pt here today to further discussion on wanting to lose weight. Pt did get labs done and vit d was high so she is going to start taking 1 tablet instead of 2.     Pt denies any fever or chills.  Pt today denies any HA, chest pain, or SOB.  Pt denies any N/V/D/C or abdominal pain.    Otherwise pt doing well on current tx and no other concerns today.     The patient's past medical, surgical, social, and family history as well as his current medications and allergies were reviewed as documented in today's encounter by SUSIE Marc.      Previous office notes, labs, imaging and hospital records were reviewed prior to and during encounter.    Current Outpatient Medications on File Prior to Visit   Medication Sig Dispense Refill    metFORMIN (GLUCOPHAGE) 1000 MG tablet Take 1 tablet by mouth with breakfast and with evening meal with meals 180 tablet 1    mupirocin (BACTROBAN) 2 % ointment Apply to wound twice daily 22 g 2    losartan (COZAAR) 25 MG tablet TAKE 1 TABLET BY MOUTH DAILY 90 tablet 1    escitalopram (LEXAPRO) 5 MG tablet TAKE ONE TABLET BY MOUTH ONCE NIGHTLY 90 tablet 1    meloxicam (MOBIC) 15 MG tablet Take 1 tablet by mouth daily 90 tablet 1    acyclovir (ZOVIRAX) 400 MG tablet Take 1 tablet by mouth 2 times daily 180 tablet 1    montelukast (SINGULAIR) 10 MG tablet Take 1 tablet by mouth nightly 90 tablet 3    rosuvastatin (CRESTOR) 10 MG tablet Take 1 tablet by mouth daily 90 tablet 1    mirtazapine (REMERON) 30 MG tablet Take 1 tablet by mouth nightly 90 tablet 1    omeprazole (PRILOSEC) 20 MG delayed release capsule TAKE 1 CAPSULE BY MOUTH TWICE A DAY BEFORE MEALS 180 capsule 1    gabapentin (NEURONTIN) 600 MG tablet Take 2 tablets by mouth nightly

## 2024-03-17 DIAGNOSIS — M17.11 OSTEOARTHRITIS OF RIGHT KNEE, UNSPECIFIED OSTEOARTHRITIS TYPE: ICD-10-CM

## 2024-03-18 RX ORDER — MELOXICAM 15 MG/1
15 TABLET ORAL DAILY
Qty: 90 TABLET | Refills: 1 | Status: SHIPPED | OUTPATIENT
Start: 2024-03-18

## 2024-04-05 ENCOUNTER — OFFICE VISIT (OUTPATIENT)
Dept: FAMILY MEDICINE CLINIC | Age: 69
End: 2024-04-05

## 2024-04-05 VITALS
RESPIRATION RATE: 16 BRPM | TEMPERATURE: 97 F | DIASTOLIC BLOOD PRESSURE: 86 MMHG | HEART RATE: 84 BPM | WEIGHT: 156 LBS | SYSTOLIC BLOOD PRESSURE: 128 MMHG | BODY MASS INDEX: 25.96 KG/M2 | OXYGEN SATURATION: 97 %

## 2024-04-05 DIAGNOSIS — B96.89 ACUTE BACTERIAL SINUSITIS: ICD-10-CM

## 2024-04-05 DIAGNOSIS — J01.90 ACUTE BACTERIAL SINUSITIS: ICD-10-CM

## 2024-04-05 DIAGNOSIS — R63.5 WEIGHT GAIN: Primary | ICD-10-CM

## 2024-04-05 RX ORDER — PHENTERMINE HYDROCHLORIDE 37.5 MG/1
37.5 TABLET ORAL
Qty: 30 TABLET | Refills: 0 | Status: SHIPPED | OUTPATIENT
Start: 2024-04-05 | End: 2024-05-05

## 2024-04-05 RX ORDER — AZITHROMYCIN 250 MG/1
TABLET, FILM COATED ORAL
Qty: 6 TABLET | Refills: 0 | Status: SHIPPED | OUTPATIENT
Start: 2024-04-05 | End: 2024-04-15

## 2024-04-05 ASSESSMENT — ENCOUNTER SYMPTOMS
COUGH: 1
CONSTIPATION: 0
NAUSEA: 0
BACK PAIN: 0
ABDOMINAL PAIN: 0
ABDOMINAL DISTENTION: 0
COLOR CHANGE: 0
SINUS PRESSURE: 1
DIARRHEA: 0
CHEST TIGHTNESS: 0
RHINORRHEA: 1
SORE THROAT: 0

## 2024-04-05 NOTE — PROGRESS NOTES
results and face to face with the patient discussing the diagnosis and importance of compliance with the treatment plan as well as documenting on the day of the visit.     Emy NAVARRO-CNP

## 2024-04-17 ENCOUNTER — OFFICE VISIT (OUTPATIENT)
Dept: DERMATOLOGY | Age: 69
End: 2024-04-17

## 2024-04-17 VITALS
BODY MASS INDEX: 25.99 KG/M2 | WEIGHT: 156 LBS | DIASTOLIC BLOOD PRESSURE: 80 MMHG | TEMPERATURE: 97.3 F | SYSTOLIC BLOOD PRESSURE: 118 MMHG | HEART RATE: 95 BPM | OXYGEN SATURATION: 97 % | HEIGHT: 65 IN

## 2024-04-17 DIAGNOSIS — B07.9 VERRUCA VULGARIS: Primary | ICD-10-CM

## 2024-04-18 NOTE — PROGRESS NOTES
RX sent to [facility]  
vulgaris             Plan:  Verruca vulgaris  - start Fluowart nightly   Start Fluowart (salicyclic acid 15% + 5-FU 5% compounded cream) applied nightly under occlusion to wart(s). Discussed expected course including irritation of surrounding skin, which can be minimized by focal application.  Cryotherapy: After verbal consent was obtained including discussion of the risks (lesion persistence, lesion recurrence and hypo/hyperpigmentation) and benefits (resolution of the lesion), and alternative therapies, 1 total Wart on the right pinky finger were treated with liquid nitrogen in a spray gun for a single 6 second freeze-thaw cycle for each lesion. The patient tolerated the procedure well and there were no immediate complications.      RTC 4 weeks wart    Future Appointments   Date Time Provider Department Center   5/3/2024  8:15 AM Emy Boucher APRN - CNP [facility]         Patient Instructions   Fluowart -  from [facility]       I, Nguyen Bernal, personally scribed the services dictated to me by Dr. Kamara in this documentation.     I, Dr. Kamara, personally performed the services described in this documentation, as scribed by Nguyen Bernal in my presence, and it is both accurate and complete.

## 2024-05-03 ENCOUNTER — OFFICE VISIT (OUTPATIENT)
Dept: FAMILY MEDICINE CLINIC | Age: 69
End: 2024-05-03

## 2024-05-03 VITALS
OXYGEN SATURATION: 95 % | DIASTOLIC BLOOD PRESSURE: 80 MMHG | RESPIRATION RATE: 16 BRPM | SYSTOLIC BLOOD PRESSURE: 116 MMHG | HEART RATE: 86 BPM | WEIGHT: 152 LBS | TEMPERATURE: 97.6 F | BODY MASS INDEX: 25.29 KG/M2

## 2024-05-03 DIAGNOSIS — R63.5 WEIGHT GAIN: Primary | ICD-10-CM

## 2024-05-03 DIAGNOSIS — T14.8XXA MUSCLE STRAIN: ICD-10-CM

## 2024-05-03 RX ORDER — TIZANIDINE 2 MG/1
2 TABLET ORAL NIGHTLY PRN
Qty: 30 TABLET | Refills: 0 | Status: SHIPPED | OUTPATIENT
Start: 2024-05-03

## 2024-05-03 RX ORDER — PHENTERMINE HYDROCHLORIDE 37.5 MG/1
37.5 TABLET ORAL
Qty: 30 TABLET | Refills: 0 | Status: SHIPPED | OUTPATIENT
Start: 2024-05-03 | End: 2024-06-02

## 2024-05-03 ASSESSMENT — ENCOUNTER SYMPTOMS
CHEST TIGHTNESS: 0
SINUS PAIN: 0
SORE THROAT: 0
RHINORRHEA: 0
ABDOMINAL DISTENTION: 0
ABDOMINAL PAIN: 0
CONSTIPATION: 0
COUGH: 0
SINUS PRESSURE: 0
COLOR CHANGE: 0
SHORTNESS OF BREATH: 0
NAUSEA: 0
BACK PAIN: 1
DIARRHEA: 0

## 2024-05-03 NOTE — PROGRESS NOTES
place, and time.      Deep Tendon Reflexes: Reflexes normal.   Psychiatric:         Mood and Affect: Mood normal.         Behavior: Behavior normal. Behavior is cooperative.       Assessment:      Diagnosis Orders   1. Weight gain  phentermine (ADIPEX-P) 37.5 MG tablet      2. Muscle strain  tiZANidine (ZANAFLEX) 2 MG tablet        Plan:     Weight gain  -Stable: Medication re-filled as needed, con't medications as prescribed, con't current tx plan  - Continue adipex as previously prescribed  - I also did stress the importance of exercise to aim for 150 minutes of aerobic activity a week and to watch her diet and calorie intake.  - Will continue with monthly BP and weight checks    Muscle strain  - ice and heat as needed.  - tylenol for pain.  - start tizanidine as needed at night.  - stretches and exercises given  - call if symptoms worsen or do not improve.    - Rest of systems unchanged, continue current treatments.    On this date 5/3/2024 I have spent 30 minutes reviewing previous notes, test results and face to face with the patient discussing the diagnosis and importance of compliance with the treatment plan as well as documenting on the day of the visit.     Emy NAVARRO-CNP

## 2024-05-03 NOTE — PATIENT INSTRUCTIONS
Patient Education   Back Stretches: Exercises  Introduction  Here are some examples of exercises for stretching your back. Start each exercise slowly. Ease off the exercise if you start to have pain.  Your doctor or physical therapist will tell you when you can start these exercises and which ones will work best for you.  How to do the exercises  Overhead stretch    Stand up straight with your feet shoulder-width apart. Or sit up straight in a chair.  Looking straight ahead, raise both arms over your head. Reach up and back with your arms until you feel a stretch in your shoulders. Do not allow your head to tilt back.  Hold for 15 to 30 seconds, then lower your arms to your sides.  Repeat 2 to 4 times.  Side stretch    Sit or stand up straight. If you're standing, keep your feet about hip-width apart.  Raise one arm over your head, and then lean to the other side.  Slide your hand down your leg as you let the weight of your raised arm gently stretch your side muscles. If you're sitting, keep your buttocks flat on the chair.  Hold for 15 to 30 seconds. Then switch sides.  Repeat 2 to 4 times on each side.  Press-up back extension    Lie on your stomach, supporting your body with your forearms. Keep your elbows below your shoulders.  Press your elbows down into the floor to raise your upper back. As you do this, relax your stomach muscles and allow your back to arch without using your back muscles. Don't let your hips or pelvis come off the floor.  Hold for 15 to 30 seconds, then relax.  Repeat 2 to 4 times.  Supported rest    Lie on your back with a rolled towel under your neck. Support your knees with something, such as pillows or folded towels or blankets. Extend your arms comfortably to your sides.  Relax and breathe normally.  Remain in this position for about 10 minutes.  If you can, do this 2 or 3 times each day.  Follow-up care is a key part of your treatment and safety. Be sure to make and go to all

## 2024-05-22 ENCOUNTER — HOSPITAL ENCOUNTER (OUTPATIENT)
Dept: GENERAL RADIOLOGY | Age: 69
Discharge: HOME | End: 2024-05-24
Payer: MEDICARE

## 2024-05-22 ENCOUNTER — HOSPITAL ENCOUNTER (OUTPATIENT)
Age: 69
Discharge: HOME | End: 2024-05-24
Payer: MEDICARE

## 2024-05-22 ENCOUNTER — OFFICE VISIT (OUTPATIENT)
Dept: FAMILY MEDICINE CLINIC | Age: 69
End: 2024-05-22

## 2024-05-22 VITALS
TEMPERATURE: 97 F | RESPIRATION RATE: 16 BRPM | SYSTOLIC BLOOD PRESSURE: 124 MMHG | OXYGEN SATURATION: 96 % | DIASTOLIC BLOOD PRESSURE: 76 MMHG | WEIGHT: 150 LBS | BODY MASS INDEX: 24.96 KG/M2 | HEART RATE: 88 BPM

## 2024-05-22 DIAGNOSIS — M89.9 PUBIC BONE PAIN: Primary | ICD-10-CM

## 2024-05-22 DIAGNOSIS — Z78.0 POSTMENOPAUSAL: ICD-10-CM

## 2024-05-22 DIAGNOSIS — M89.9 PUBIC BONE PAIN: ICD-10-CM

## 2024-05-22 PROCEDURE — 73521 X-RAY EXAM HIPS BI 2 VIEWS: CPT

## 2024-05-22 NOTE — PROGRESS NOTES
Emy Boucher, APRN-CNP  [facility]  [facility] MEDICINE  [address]  [facility] [address]  Dept: [phone]  Dept Fax: [phone]     Patient ID: Marianna Fraser is a 68 y.o. female Established patient    HPI    Pt here today for an acute visit secondary to having pain at the tailbone when she is standing for a long period of time. She will get a pain that goes down the right leg, she gets a tingling sensation at times that will be in the thigh. The pain has been constant while on vacation but since being home it has improved some but she is using lidocaine patches and tramadol that helps it. Denies any problems with walking it makes it feel better.     Pt denies any fever or chills.  Pt today denies any HA, chest pain, or SOB.  Pt denies any N/V/D/C or abdominal pain.    Otherwise pt doing well on current tx and no other concerns today.     The patient's past medical, surgical, social, and family history as well as his current medications and allergies were reviewed as documented in today's encounter by SUSIE Marc.      Previous office notes, labs, imaging and hospital records were reviewed prior to and during encounter.    Current Outpatient Medications on File Prior to Visit   Medication Sig Dispense Refill    phentermine (ADIPEX-P) 37.5 MG tablet Take 1 tablet by mouth every morning (before breakfast) for 30 days. Max Daily Amount: 37.5 mg 30 tablet 0    tiZANidine (ZANAFLEX) 2 MG tablet Take 1 tablet by mouth nightly as needed (muscle spasm/ pain) 30 tablet 0    meloxicam (MOBIC) 15 MG tablet Take 1 tablet by mouth daily 90 tablet 1    metFORMIN (GLUCOPHAGE) 1000 MG tablet Take 1 tablet by mouth with breakfast and with evening meal with meals 180 tablet 1    losartan (COZAAR) 25 MG tablet TAKE 1 TABLET BY MOUTH DAILY 90 tablet 1    escitalopram (LEXAPRO) 5 MG tablet TAKE ONE TABLET BY MOUTH ONCE NIGHTLY 90 tablet 1    acyclovir (ZOVIRAX) 400 MG

## 2024-05-23 ENCOUNTER — PATIENT MESSAGE (OUTPATIENT)
Dept: FAMILY MEDICINE CLINIC | Age: 69
End: 2024-05-23

## 2024-05-23 DIAGNOSIS — K21.9 GASTROESOPHAGEAL REFLUX DISEASE WITHOUT ESOPHAGITIS: ICD-10-CM

## 2024-05-23 RX ORDER — OMEPRAZOLE 20 MG/1
CAPSULE, DELAYED RELEASE ORAL
Qty: 60 CAPSULE | Refills: 0 | Status: SHIPPED | OUTPATIENT
Start: 2024-05-23

## 2024-05-23 RX ORDER — PREDNISONE 10 MG/1
TABLET ORAL
Qty: 18 TABLET | Refills: 0 | Status: SHIPPED | OUTPATIENT
Start: 2024-05-23 | End: 2024-06-02

## 2024-05-23 ASSESSMENT — ENCOUNTER SYMPTOMS
COLOR CHANGE: 0
SORE THROAT: 0
ABDOMINAL PAIN: 0
DIARRHEA: 0
RHINORRHEA: 0
ABDOMINAL DISTENTION: 0
CHEST TIGHTNESS: 0
NAUSEA: 0
SHORTNESS OF BREATH: 0
COUGH: 0
BACK PAIN: 1
CONSTIPATION: 0

## 2024-05-30 ENCOUNTER — OFFICE VISIT (OUTPATIENT)
Dept: FAMILY MEDICINE CLINIC | Age: 69
End: 2024-05-30

## 2024-05-30 VITALS
TEMPERATURE: 97 F | OXYGEN SATURATION: 98 % | WEIGHT: 152 LBS | HEART RATE: 94 BPM | SYSTOLIC BLOOD PRESSURE: 136 MMHG | DIASTOLIC BLOOD PRESSURE: 84 MMHG | BODY MASS INDEX: 25.29 KG/M2 | RESPIRATION RATE: 16 BRPM

## 2024-05-30 DIAGNOSIS — R63.5 WEIGHT GAIN: Primary | ICD-10-CM

## 2024-05-30 DIAGNOSIS — M46.1 DEGENERATIVE JOINT DISEASE OF SACROILIAC JOINT (HCC): ICD-10-CM

## 2024-05-30 ASSESSMENT — ENCOUNTER SYMPTOMS
DIARRHEA: 0
SINUS PAIN: 0
ABDOMINAL PAIN: 0
CHEST TIGHTNESS: 0
SHORTNESS OF BREATH: 0
BACK PAIN: 1
COLOR CHANGE: 0
NAUSEA: 0
SINUS PRESSURE: 0
ABDOMINAL DISTENTION: 0
RHINORRHEA: 0
CONSTIPATION: 0
SORE THROAT: 0
COUGH: 0

## 2024-05-30 NOTE — PROGRESS NOTES
Emy Boucher, APRN-CNP  [facility]  [facility] MEDICINE  [address], [address]  [facility] [address]  Dept: [phone]  Dept Fax: [phone]     Patient ID: Marianna Fraser is a 68 y.o. female.    HPI    Pt here today for BP and weight check while being on Adipex.  A refill of the medication is needed today. Pt denies any fever or chills.  Pt today denies any HA, chest pain, or SOB.  Pt denies any N/V/D/C or abdominal pain. This is month 3 since starting. Since her last visit, she has lost 0 lbs for a cumulative total of 8 lbs.    Pt states she has been having a muscle pain near her tailbone. She was bent over and cleaning out a bathroom closet and started to get some back pain and muscle pain to her bottom. It bothers her at times when she sits. She is able to walk and move no problem. Denies any numbness or tingling. She has taken tylenol for pain. Denies any injury or fall.     Otherwise patient is doing well on current tx and voices no other concerns today.     The patient's past medical, surgical, social, and family history as well as his current medications and allergies were reviewed as documented in today's encounter by SUSIE Marc.    My previous office notes, labs and diagnostic studies were reviewed prior to and during encounter.     Current Outpatient Medications on File Prior to Visit   Medication Sig Dispense Refill    omeprazole (PRILOSEC) 20 MG delayed release capsule TAKE 1 CAPSULE BY MOUTH TWICE A DAY. TAKE PRIOR TO MEALS 60 capsule 0    predniSONE (DELTASONE) 10 MG tablet Take 3 tablets together daily for 3 days, then 2 tablets daily for 3 days, then 1 tablet daily for 3 days. 18 tablet 0    phentermine (ADIPEX-P) 37.5 MG tablet Take 1 tablet by mouth every morning (before breakfast) for 30 days. Max Daily Amount: 37.5 mg 30 tablet 0    tiZANidine (ZANAFLEX) 2 MG tablet Take 1 tablet by mouth nightly as needed (muscle spasm/ pain) 30 tablet 0

## 2024-06-03 ENCOUNTER — OFFICE VISIT (OUTPATIENT)
Dept: DERMATOLOGY | Age: 69
End: 2024-06-03
Payer: MEDICARE

## 2024-06-03 VITALS
TEMPERATURE: 97.8 F | DIASTOLIC BLOOD PRESSURE: 75 MMHG | HEIGHT: 65 IN | WEIGHT: 152 LBS | OXYGEN SATURATION: 98 % | BODY MASS INDEX: 25.33 KG/M2 | HEART RATE: 89 BPM | SYSTOLIC BLOOD PRESSURE: 133 MMHG

## 2024-06-03 DIAGNOSIS — L30.8 OTHER SPECIFIED DERMATITIS: ICD-10-CM

## 2024-06-03 DIAGNOSIS — B07.9 VERRUCA VULGARIS: Primary | ICD-10-CM

## 2024-06-03 PROCEDURE — 11104 PUNCH BX SKIN SINGLE LESION: CPT | Performed by: DERMATOLOGY

## 2024-06-03 PROCEDURE — 17110 DESTRUCTION B9 LES UP TO 14: CPT | Performed by: DERMATOLOGY

## 2024-06-03 RX ORDER — LIDOCAINE HYDROCHLORIDE AND EPINEPHRINE 10; 10 MG/ML; UG/ML
0.6 INJECTION, SOLUTION INFILTRATION; PERINEURAL ONCE
Status: SHIPPED | OUTPATIENT
Start: 2024-06-03

## 2024-06-03 NOTE — PATIENT INSTRUCTIONS
Cryotherapy    Liquid Nitrogen - \"freeze\" (Cryotherapy)  Your doctor has treated your skin lesions with a very cold substance.  The liquid nitrogen is so cold that it may feel like the skin is burning during application.  A clear blister or blood blister may form after treatment and may later form a scab.  Leave the area alone.  Usually this scab will fall of within 1-2 weeks.  The area should be kept clean and can be covered with Vaseline and a Band-Aid if needed. If a large blister develops it is ok to use a clean needle to gently pop the blister. Please call our office with any concerns at [phone].  -----------------------------------------------------  BIOPSY WOUND CARE    A biopsy is where a small piece of skin tissue is removed and examined by a pathologist.  When a biopsy is done, there is a small wound site that requires proper care to prevent infection and scarring.  Some biopsies require sutures and their removal.    How to Care for Biopsy Wound    A.  Leave band-aid or dressing on for 24 hours.  B.  Wash two times a day with soap and water.  C.  Let the wound air dry, then apply Vaseline ointment and cover with a Band-Aid       unless otherwise instructed by your provider.   D.  If there is slight discomfort, you may give acetaminophen or ibuprofen.    When To Call the Doctor    Call the Dermatology Clinic or your doctor if any of the following occur:    A.  Redness and swelling  B.  Tenderness and warm to touch  C.  Drainage from wound  D.  Fever    Biopsy Results    Biopsy results are usually available in 1-2 weeks.  We provide biopsy results in letters or via [employer] for benign results or we will call for any concerning results.  If you have not heard from our staff please call the office within 2 weeks. Please call our office with any concerns at [phone].

## 2024-06-03 NOTE — PROGRESS NOTES
0    meloxicam (MOBIC) 15 MG tablet Take 1 tablet by mouth daily 90 tablet 1    metFORMIN (GLUCOPHAGE) 1000 MG tablet Take 1 tablet by mouth with breakfast and with evening meal with meals 180 tablet 1    losartan (COZAAR) 25 MG tablet TAKE 1 TABLET BY MOUTH DAILY 90 tablet 1    escitalopram (LEXAPRO) 5 MG tablet TAKE ONE TABLET BY MOUTH ONCE NIGHTLY 90 tablet 1    acyclovir (ZOVIRAX) 400 MG tablet Take 1 tablet by mouth 2 times daily 180 tablet 1    montelukast (SINGULAIR) 10 MG tablet Take 1 tablet by mouth nightly 90 tablet 3    rosuvastatin (CRESTOR) 10 MG tablet Take 1 tablet by mouth daily 90 tablet 1    mirtazapine (REMERON) 30 MG tablet Take 1 tablet by mouth nightly 90 tablet 1    gabapentin (NEURONTIN) 600 MG tablet Take 2 tablets by mouth nightly for 180 days. 180 tablet 1    triamcinolone (KENALOG) 0.1 % cream Apply topically 2 times daily. 453 g 0    fluticasone-salmeterol (ADVAIR DISKUS) 100-50 MCG/DOSE diskus inhaler Inhale 1 puff into the lungs 2 times daily 180 each 1    fluticasone (FLONASE) 50 MCG/ACT nasal spray 2 sprays by Nasal route daily 3 each 1    aspirin 81 MG chewable tablet Take 1 tablet by mouth daily      vitamin D 25 MCG (1000 UT) CAPS Take by mouth      GLUCOSAMINE HCL PO Take by mouth       Current Facility-Administered Medications   Medication Dose Route Frequency Provider Last Rate Last Admin    lidocaine 1 % injection 0.5 mL  0.5 mL IntraDERmal Once Emeli Kamara MD           ALLERGIES:   Allergies   Allergen Reactions    Latex     Seasonal     Penicillins Rash       SOCIAL HISTORY:  Social History     Tobacco Use    Smoking status: Never    Smokeless tobacco: Never   Substance Use Topics    Alcohol use: No       Pertinent ROS:  Review of Systems  Skin: Denies any new changing, growing or bleeding lesions or rashes except as described in the HPI   Constitutional: Denies fevers, chills, and malaise.    PHYSICAL EXAM:   /75   Pulse 89   Temp 97.8 °F (36.6 °C)   Ht 1.651

## 2024-06-04 ENCOUNTER — HOSPITAL ENCOUNTER (OUTPATIENT)
Dept: PHYSICAL THERAPY | Facility: CLINIC | Age: 69
Setting detail: THERAPIES SERIES
Discharge: HOME | End: 2024-06-04
Payer: MEDICARE

## 2024-06-04 ENCOUNTER — NURSE ONLY (OUTPATIENT)
Dept: LAB | Age: 69
End: 2024-06-04

## 2024-06-04 PROCEDURE — 97110 THERAPEUTIC EXERCISES: CPT

## 2024-06-04 PROCEDURE — 97161 PT EVAL LOW COMPLEX 20 MIN: CPT

## 2024-06-04 NOTE — CONSULTS
- 3 reps - 30sec hold  - Supine Hip Adduction Isometric with Ball  - 1 x daily - 7 x weekly - 2 sets - 10 reps - 5sec hold  - Clamshell with Resistance  - 1 x daily - 7 x weekly - 2 sets - 10 reps  - Sidelying Reverse Clamshell with Resistance  - 1 x daily - 7 x weekly - 2 sets - 10 reps  - Supine Sciatic Nerve Glide  - 1 x daily - 7 x weekly - 2 sets - 10 reps  - Piriformis Mobilization with Small Ball  - 1 x daily - 7 x weekly - 2 sets - 1 reps - 2-5 mins hold  - Piriformis Mobilization on Foam Roll  - 1 x daily - 7 x weekly - 2 sets - 1 reps - 2-5 mins hold    Comprehension of Education:  [x] Verbalizes understanding.  [x] Demonstrates understanding.  [] Needs Review.  [] Demonstrates/verbalizes understanding of HEP/Ed previously given.      Treatment Plan:  [x] Therapeutic Exercise   47608  [] Iontophoresis: 4 mg/mL Dexamethasone Sodium Phosphate  mAmin  02028   [x] Therapeutic Activity  25551 [] Vasopneumatic cold with compression  96336    [] Gait Training   69959 [] Ultrasound   82797   [x] Neuromuscular Re-education  27635 [] Electrical Stimulation Unattended  84437   [x] Manual Therapy  66171 [] Electrical Stimulation Attended  12465   [x] Instruction in HEP  [] Lumbar/Cervical Traction  86800   [] Aquatic Therapy   66220 [x] Cold/hotpack    [] Massage   81282      [] Dry Needling, 1 or 2 muscles  89785   [] Biofeedback, first 15 minutes   58332  [] Biofeedback, additional 15 minutes   96078 [] Dry Needling, 3 or more muscles  26259       Frequency:  2 x/week for 10 visits        Today’s Treatment:  Modalities:   Precautions:  Exercises:  Exercise Reps/ Time Weight/ Level Comments         LTR  10x5s ea     Piriformis S - 2 ways 30s ea   Hooklying  Report of inc numbness/tingling in RLE when performing    Hip ADD 10x5s Ball squeeze    SL clam 10x ea  Orange     SL reverse clam 10x ea Orange  Towel roll in between knees         Piriformis self-release technique with tennis ball  HEP

## 2024-06-06 ENCOUNTER — HOSPITAL ENCOUNTER (OUTPATIENT)
Dept: PHYSICAL THERAPY | Facility: CLINIC | Age: 69
Setting detail: THERAPIES SERIES
Discharge: HOME | End: 2024-06-06
Payer: MEDICARE

## 2024-06-06 PROCEDURE — 97140 MANUAL THERAPY 1/> REGIONS: CPT

## 2024-06-06 PROCEDURE — 97110 THERAPEUTIC EXERCISES: CPT

## 2024-06-06 NOTE — FLOWSHEET NOTE
Piriformis Stretch with Foot on Ground  - 1 x daily - 7 x weekly - 1 sets - 3 reps - 30sec hold  - Supine Figure 4 Piriformis Stretch  - 1 x daily - 7 x weekly - 1 sets - 3 reps - 30sec hold  - Supine Hip Adduction Isometric with Ball  - 1 x daily - 7 x weekly - 2 sets - 10 reps - 5sec hold  - Clamshell with Resistance  - 1 x daily - 7 x weekly - 2 sets - 10 reps  - Sidelying Reverse Clamshell with Resistance  - 1 x daily - 7 x weekly - 2 sets - 10 reps  - Supine Sciatic Nerve Glide  - 1 x daily - 7 x weekly - 2 sets - 10 reps  - Piriformis Mobilization with Small Ball  - 1 x daily - 7 x weekly - 2 sets - 1 reps - 2-5 mins hold  - Piriformis Mobilization on Foam Roll  - 1 x daily - 7 x weekly - 2 sets - 1 reps - 2-5 mins hold     Comprehension of Education:  [x] Verbalizes understanding.  [] Demonstrates understanding.  [] Needs review.  [] Demonstrates/verbalizes HEP/Ed previously given.     Plan: [x] Continue current frequency toward long and short term goals.    [] Specific Instructions for subsequent treatments:       Time In: 8:00 am            Time Out: 8:50 am    Electronically signed by:  Caroline Spring PT

## 2024-06-09 DIAGNOSIS — L80 VITILIGO: Primary | ICD-10-CM

## 2024-06-09 RX ORDER — TRIAMCINOLONE ACETONIDE 1 MG/G
CREAM TOPICAL
Qty: 454 G | Refills: 1 | Status: SHIPPED | OUTPATIENT
Start: 2024-06-09

## 2024-06-10 ENCOUNTER — HOSPITAL ENCOUNTER (OUTPATIENT)
Dept: PHYSICAL THERAPY | Facility: CLINIC | Age: 69
Setting detail: THERAPIES SERIES
Discharge: HOME | End: 2024-06-10
Payer: MEDICARE

## 2024-06-10 PROCEDURE — 97140 MANUAL THERAPY 1/> REGIONS: CPT

## 2024-06-10 PROCEDURE — 97110 THERAPEUTIC EXERCISES: CPT

## 2024-06-10 NOTE — FLOWSHEET NOTE
[] [facility]  Outpatient Rehabilitation &  Therapy  [address]  [phone]  [phone] [] [facility]  Outpatient Rehabilitation &  Therapy  [address]  P: [phone]  F: [phone] [x] [facility]  Outpatient Rehabilitation &  Therapy  [address]  P: [phone]  F: [phone] [] [facility]  Outpatient Rehabilitation &  Therapy  [address]  [phone]  [phone] [] [facility]  Outpatient Rehabilitation &  Therapy  [address]   P: [phone]  F: [phone]  [] [facility]  Outpatient Rehabilitation &  Therapy  [address]  P: [phone]  F: [phone] [] [facility]  Outpatient Rehabilitation &  Therapy  [address]  P: [phone]  F: [phone] [] [facility]  Outpatient Rehabilitation &  Therapy  [address]  P: [phone]  F: [phone] [] [facility]  Outpatient Rehabilitation &  Therapy  [address]  P: [phone]  F: [phone]  [] [facility] Outpatient Rehabilitation &  Therapy  [address]  P: [phone]  F: [phone]     Physical Therapy Daily Treatment Note    Date:  6/10/2024  Patient Name:  Marianna Fraser    :  1955  MRN: [id number]  Physician: Emy Boucher APRN - CNP                            Insurance: MEDICARE (BOMN)/[facility] MEDICARE SUPP   Medical Diagnosis: Pubic bone pain (M89.9)   Rehab Codes: M54.59, M25.551, M25.651, M25.652, R29.3, M62.81, M54.31  Onset Date: 24                 Next 's appt.: TBD     Visit# / total visits: 3/10; Progress note for Medicare patient due at visit 6     Cancels/No Shows: 0/0      Subjective:    Pain:  [x] Yes  [] No Location: L-sided LB, B lateral hip, R piriformis Pain Rating: (0-10 scale)

## 2024-06-13 ENCOUNTER — HOSPITAL ENCOUNTER (OUTPATIENT)
Dept: PHYSICAL THERAPY | Facility: CLINIC | Age: 69
Setting detail: THERAPIES SERIES
Discharge: HOME | End: 2024-06-13
Payer: MEDICARE

## 2024-06-13 PROCEDURE — 97140 MANUAL THERAPY 1/> REGIONS: CPT

## 2024-06-13 PROCEDURE — 97110 THERAPEUTIC EXERCISES: CPT

## 2024-06-13 NOTE — FLOWSHEET NOTE
Patient would continue to benefit from skilled physical therapy services in order to: Patient would benefit from skilled physical therapy services in order to: address current impairments in order to assist pt in improving tolerance with daily physical activities and overall quality of life.      Problems:    [x] ? Pain: 1-9/10 R ischial tuberosity/piriformis and (+) radicular symptoms to RLE  [x] ? ROM: lumbosacral rhythm, piriformis  [x] ? Strength: core stabilizers, B hip girdle globally        STG: (to be met in 6 treatments)  Pt will reduce pain to less than or equal to 5/10 with ADLs  Pt to report reduction of pain with palpation at R piriformis or ischial tuberosity to 5/10 max to demo improved tissue inflammation and healing process  Pt to improve B hip abductors and extensors strength to at least 4-/5 to improve tolerance with prolonged standing/walking activities   Patient to be independent with home exercise program as demonstrated by performance with correct form without cues.     LTG: (to be met in 10 treatments)  Pt to improve B hip strength to at least 4/5 throughout to ease difficulty with daily physical activities   Pt to report min to no pain with lumbar B rotation and sidebend to demo improved tissue extensibility   Pt to report ability to perform daily physical activities with reduced frequency of radicular symptoms in RLE to demo reduced nerve tension and tightness of piriformis     Patient goals: \"to alleviate pain\"           Pt. Education:  [] Yes  [] No  [x] Reviewed Prior HEP/Ed  Method of Education: [] Verbal  [] Demo  [] Written    Access Code: [email or web address]  URL: [email or web address]  Date: 06/04/2024  Prepared by: Caroline Spring     Exercises  - Supine Lower Trunk Rotation  - 1 x daily - 7 x weekly - 2 sets - 10 reps - 5sec hold  - Supine Piriformis Stretch with Foot on Ground  - 1 x daily - 7 x weekly - 1 sets - 3 reps - 30sec hold  - Supine Figure 4 Piriformis Stretch  - 1 x daily - 7 x

## 2024-06-17 ENCOUNTER — HOSPITAL ENCOUNTER (OUTPATIENT)
Dept: PHYSICAL THERAPY | Facility: CLINIC | Age: 69
Setting detail: THERAPIES SERIES
Discharge: HOME | End: 2024-06-17
Payer: MEDICARE

## 2024-06-17 PROCEDURE — 97110 THERAPEUTIC EXERCISES: CPT

## 2024-06-17 NOTE — FLOWSHEET NOTE
[] [facility]  Outpatient Rehabilitation &  Therapy  [address]  [phone]  [phone] [] [facility]  Outpatient Rehabilitation &  Therapy  [address]  P: [phone]  F: [phone] [x] [facility]  Outpatient Rehabilitation &  Therapy  [address]  P: [phone]  F: [phone] [] [facility]  Outpatient Rehabilitation &  Therapy  [address]  [phone]  [phone] [] [facility]  Outpatient Rehabilitation &  Therapy  [address]   P: [phone]  F: [phone]  [] [facility]  Outpatient Rehabilitation &  Therapy  [address]  P: [phone]  F: [phone] [] [facility]  Outpatient Rehabilitation &  Therapy  [address]  P: [phone]  F: [phone] [] [facility]  Outpatient Rehabilitation &  Therapy  [address]  P: [phone]  F: [phone] [] [facility]  Outpatient Rehabilitation &  Therapy  [address]  P: [phone]  F: [phone]  [] [facility] Outpatient Rehabilitation &  Therapy  [address]  P: [phone]  F: [phone]     Physical Therapy Daily Treatment Note    Date:  2024  Patient Name:  Marianna Fraser    :  1955  MRN: [id number]  Physician: Emy Boucher APRN - CNP                            Insurance: MEDICARE (BOMN)/[facility] MEDICARE SUPP   Medical Diagnosis: Pubic bone pain (M89.9)   Rehab Codes: M54.59, M25.551, M25.651, M25.652, R29.3, M62.81, M54.31  Onset Date: 24                 Next 's appt.: TBD     Visit# / total visits: 5/10; Progress note for Medicare patient due at visit 6     Cancels/No Shows: 0/0      Subjective:    Pain:  [x] Yes  [] No Location: L-sided LB to RLE Pain Rating: (0-10 scale) 4/10  Pain altered Tx:

## 2024-06-20 ENCOUNTER — HOSPITAL ENCOUNTER (OUTPATIENT)
Dept: PHYSICAL THERAPY | Facility: CLINIC | Age: 69
Setting detail: THERAPIES SERIES
Discharge: HOME | End: 2024-06-20
Payer: MEDICARE

## 2024-06-20 ENCOUNTER — OFFICE VISIT (OUTPATIENT)
Dept: FAMILY MEDICINE CLINIC | Age: 69
End: 2024-06-20

## 2024-06-20 ENCOUNTER — OFFICE VISIT (OUTPATIENT)
Dept: ORTHOPEDIC SURGERY | Age: 69
End: 2024-06-20
Payer: MEDICARE

## 2024-06-20 VITALS
WEIGHT: 149 LBS | BODY MASS INDEX: 24.79 KG/M2 | OXYGEN SATURATION: 97 % | HEART RATE: 84 BPM | DIASTOLIC BLOOD PRESSURE: 88 MMHG | TEMPERATURE: 97.6 F | SYSTOLIC BLOOD PRESSURE: 132 MMHG | RESPIRATION RATE: 16 BRPM

## 2024-06-20 VITALS — BODY MASS INDEX: 24.83 KG/M2 | HEIGHT: 65 IN | RESPIRATION RATE: 14 BRPM | WEIGHT: 149 LBS

## 2024-06-20 DIAGNOSIS — M46.1 DEGENERATIVE JOINT DISEASE OF SACROILIAC JOINT (HCC): Primary | ICD-10-CM

## 2024-06-20 DIAGNOSIS — M54.50 LUMBAR SPINE PAIN: ICD-10-CM

## 2024-06-20 DIAGNOSIS — T14.8XXA MUSCLE STRAIN: ICD-10-CM

## 2024-06-20 DIAGNOSIS — M54.16 LUMBAR RADICULAR PAIN: Primary | ICD-10-CM

## 2024-06-20 DIAGNOSIS — M25.551 RIGHT HIP PAIN: ICD-10-CM

## 2024-06-20 DIAGNOSIS — M43.10 ACQUIRED SPONDYLOLISTHESIS: ICD-10-CM

## 2024-06-20 DIAGNOSIS — M41.50 DEGENERATIVE SCOLIOSIS IN ADULT PATIENT: ICD-10-CM

## 2024-06-20 PROCEDURE — G8399 PT W/DXA RESULTS DOCUMENT: HCPCS | Performed by: ORTHOPAEDIC SURGERY

## 2024-06-20 PROCEDURE — 1090F PRES/ABSN URINE INCON ASSESS: CPT | Performed by: ORTHOPAEDIC SURGERY

## 2024-06-20 PROCEDURE — 1123F ACP DISCUSS/DSCN MKR DOCD: CPT | Performed by: ORTHOPAEDIC SURGERY

## 2024-06-20 PROCEDURE — 3017F COLORECTAL CA SCREEN DOC REV: CPT | Performed by: ORTHOPAEDIC SURGERY

## 2024-06-20 PROCEDURE — G8420 CALC BMI NORM PARAMETERS: HCPCS | Performed by: ORTHOPAEDIC SURGERY

## 2024-06-20 PROCEDURE — G8427 DOCREV CUR MEDS BY ELIG CLIN: HCPCS | Performed by: ORTHOPAEDIC SURGERY

## 2024-06-20 PROCEDURE — 99213 OFFICE O/P EST LOW 20 MIN: CPT | Performed by: ORTHOPAEDIC SURGERY

## 2024-06-20 PROCEDURE — 1036F TOBACCO NON-USER: CPT | Performed by: ORTHOPAEDIC SURGERY

## 2024-06-20 RX ORDER — TIZANIDINE 2 MG/1
2 TABLET ORAL NIGHTLY PRN
Qty: 30 TABLET | Refills: 0 | Status: SHIPPED | OUTPATIENT
Start: 2024-06-20

## 2024-06-20 ASSESSMENT — ENCOUNTER SYMPTOMS
COUGH: 0
SINUS PRESSURE: 0
ABDOMINAL PAIN: 0
DIARRHEA: 0
SINUS PAIN: 0
CHEST TIGHTNESS: 0
SORE THROAT: 0
SHORTNESS OF BREATH: 0
COLOR CHANGE: 0
BACK PAIN: 1
CONSTIPATION: 0
RHINORRHEA: 0
NAUSEA: 0
ABDOMINAL DISTENTION: 0

## 2024-06-20 NOTE — FLOWSHEET NOTE
[] [facility]  Outpatient Rehabilitation &  Therapy  [address]  [phone]  [phone] [] [facility]  Outpatient Rehabilitation &  Therapy  [address]  P: [phone]  F: [phone] [x] [facility]  Outpatient Rehabilitation &  Therapy  [address]  P: [phone]  F: [phone] [] [facility]  Outpatient Rehabilitation &  Therapy  [address]  [phone]  [phone] [] [facility]  Outpatient Rehabilitation &  Therapy  [address]   P: [phone]  F: [phone]  [] [facility]  Outpatient Rehabilitation &  Therapy  [address]  P: [phone]  F: [phone] [] [facility]  Outpatient Rehabilitation &  Therapy  [address]  P: [phone]  F: [phone] [] [facility]  Outpatient Rehabilitation &  Therapy  [address]  P: [phone]  F: [phone] [] [facility]  Outpatient Rehabilitation &  Therapy  [address]  P: [phone]  F: [phone]  [] [facility] Outpatient Rehabilitation &  Therapy  [address]  P: [phone]  F: [phone]     Therapy Cancel/No Show note    Date: 2024  Patient: Marianna Fraser  : 1955  MRN: [id number]    Cancels/No Shows to date: 0    For today's appointment patient:    [x]  Cancelled    [] Rescheduled appointment    [] No-show     Reason given by patient:    []  Patient ill    []  Conflicting appointment    [] No transportation      [] Conflict with work    [] No reason given    [] Weather related    [] COVID-19    [] Other:      Comments:  Pt had f/u with doctor and would like to cx all PT appts at this time.      [] Next appointment was confirmed    Electronically signed by: Kimberlee Novak PTA

## 2024-06-20 NOTE — PROGRESS NOTES
Patient ID: Marianna Fraser is a 68 y.o. female    Chief Compliant:  Chief Complaint   Patient presents with    Lower Back Pain        Diagnostic imaging:    CT scan abdomen pelvis 2022 reviewed for lumbar spine patient likely with significant stenosis at L4-5    AP lateral lumbar spine moderate degenerative scoliosis on upright films likely a spondylolisthesis of about 6 mm L4-5 although the this could be a rotational issue and not truly a slip    Assessment and Plan:  1. Lumbar radicular pain    2. Lumbar spine pain    3. Degenerative scoliosis in adult patient    4. Acquired spondylolisthesis      Patient complains of pain in her right buttock that radiates down her right leg with dysesthesias that began 6 weeks after she was bending over cleaning out her bathroom.     Patient has been doing PT and at home exercises that aggravates her pain.    Physical therapy at this time is appearing futile    MRI lumbar spine    Continue PT lumbar spine    Follow up after imaging    HPI:  This is a 68 y.o. female who presents to the clinic today as a new patient for low back evaluation.     Patient complains of pain in her right buttock that radiates down her right leg with dysesthesias that has been ongoing 6 weeks.     Patient reports that the numbness and tingling occurs when standing but resolves when sitting.     She reports that she lists to the left and is not able to go to [employer] because her pain is aggravated with sitting on the pews.     She reports that she has completed PT and at-home exercises which aggravates her pain.    Patient has also tried muscle relaxants without any relief.     Review of Systems   All other systems reviewed and are negative.      Past History:    Current Outpatient Medications:     tiZANidine (ZANAFLEX) 2 MG tablet, Take 1 tablet by mouth nightly as needed (muscle spasm/ pain), Disp: 30 tablet, Rfl: 0    triamcinolone (KENALOG) 0.1 % cream, Apply to depigmented skin twice daily (not face,

## 2024-06-20 NOTE — PROGRESS NOTES
weakness, light-headedness and headaches.   Hematological:  Negative for adenopathy.   Psychiatric/Behavioral:  Negative for agitation and behavioral problems. The patient is not nervous/anxious.      Vitals:    06/20/24 0720   BP: 132/88   Pulse: 84   Resp: 16   Temp: 97.6 °F (36.4 °C)   SpO2: 97%       Objective:     Physical Exam  Vitals reviewed.   Constitutional:       General: She is not in acute distress.     Appearance: Normal appearance.   HENT:      Head: Normocephalic and atraumatic.      Right Ear: Hearing, tympanic membrane, ear canal and external ear normal.      Left Ear: Hearing, tympanic membrane, ear canal and external ear normal.      Mouth/Throat:      Mouth: Mucous membranes are moist.      Pharynx: No oropharyngeal exudate or posterior oropharyngeal erythema.   Eyes:      Extraocular Movements: Extraocular movements intact.      Conjunctiva/sclera: Conjunctivae normal.      Pupils: Pupils are equal, round, and reactive to light.   Cardiovascular:      Rate and Rhythm: Normal rate and regular rhythm.      Pulses: Normal pulses.      Heart sounds: Normal heart sounds. No murmur heard.  Pulmonary:      Effort: Pulmonary effort is normal. No respiratory distress.      Breath sounds: Normal breath sounds. No wheezing or rales.   Abdominal:      General: Bowel sounds are normal. There is no distension.      Palpations: Abdomen is soft.      Tenderness: There is no abdominal tenderness.   Musculoskeletal:      Cervical back: Normal range of motion.      Thoracic back: Normal.      Lumbar back: No swelling, edema, lacerations, spasms or tenderness. Decreased range of motion.      Right hip: Tenderness present.      Right lower leg: No edema.      Left lower leg: No edema.      Comments: Pain located to near pubis bone near right hip with right side sciatica   Lymphadenopathy:      Cervical: No cervical adenopathy.   Skin:     General: Skin is warm and dry.   Neurological:      General: No focal deficit

## 2024-06-22 DIAGNOSIS — K21.9 GASTROESOPHAGEAL REFLUX DISEASE WITHOUT ESOPHAGITIS: ICD-10-CM

## 2024-06-24 ENCOUNTER — APPOINTMENT (OUTPATIENT)
Dept: PHYSICAL THERAPY | Facility: CLINIC | Age: 69
End: 2024-06-24
Payer: MEDICARE

## 2024-06-24 RX ORDER — OMEPRAZOLE 20 MG/1
CAPSULE, DELAYED RELEASE ORAL
Qty: 60 CAPSULE | Refills: 0 | Status: SHIPPED | OUTPATIENT
Start: 2024-06-24

## 2024-06-26 ENCOUNTER — APPOINTMENT (OUTPATIENT)
Dept: PHYSICAL THERAPY | Facility: CLINIC | Age: 69
End: 2024-06-26
Payer: MEDICARE

## 2024-07-01 ENCOUNTER — HOSPITAL ENCOUNTER (OUTPATIENT)
Dept: MRI IMAGING | Age: 69
Discharge: HOME | End: 2024-07-03
Attending: ORTHOPAEDIC SURGERY
Payer: MEDICARE

## 2024-07-01 DIAGNOSIS — M43.10 ACQUIRED SPONDYLOLISTHESIS: ICD-10-CM

## 2024-07-01 DIAGNOSIS — M54.16 LUMBAR RADICULAR PAIN: ICD-10-CM

## 2024-07-01 DIAGNOSIS — M41.50 DEGENERATIVE SCOLIOSIS IN ADULT PATIENT: ICD-10-CM

## 2024-07-01 DIAGNOSIS — M54.50 LUMBAR SPINE PAIN: ICD-10-CM

## 2024-07-01 PROCEDURE — 72148 MRI LUMBAR SPINE W/O DYE: CPT

## 2024-07-09 ENCOUNTER — OFFICE VISIT (OUTPATIENT)
Dept: DERMATOLOGY | Age: 69
End: 2024-07-09
Payer: MEDICARE

## 2024-07-09 ENCOUNTER — TELEPHONE (OUTPATIENT)
Dept: ORTHOPEDIC SURGERY | Age: 69
End: 2024-07-09

## 2024-07-09 VITALS
HEIGHT: 65 IN | TEMPERATURE: 97.4 F | DIASTOLIC BLOOD PRESSURE: 98 MMHG | BODY MASS INDEX: 24.83 KG/M2 | WEIGHT: 149 LBS | SYSTOLIC BLOOD PRESSURE: 154 MMHG

## 2024-07-09 DIAGNOSIS — B07.9 VERRUCA VULGARIS: ICD-10-CM

## 2024-07-09 DIAGNOSIS — L80 VITILIGO: Primary | ICD-10-CM

## 2024-07-09 PROCEDURE — G8420 CALC BMI NORM PARAMETERS: HCPCS | Performed by: DERMATOLOGY

## 2024-07-09 PROCEDURE — G8399 PT W/DXA RESULTS DOCUMENT: HCPCS | Performed by: DERMATOLOGY

## 2024-07-09 PROCEDURE — 1036F TOBACCO NON-USER: CPT | Performed by: DERMATOLOGY

## 2024-07-09 PROCEDURE — 99214 OFFICE O/P EST MOD 30 MIN: CPT | Performed by: DERMATOLOGY

## 2024-07-09 PROCEDURE — 3017F COLORECTAL CA SCREEN DOC REV: CPT | Performed by: DERMATOLOGY

## 2024-07-09 PROCEDURE — G8428 CUR MEDS NOT DOCUMENT: HCPCS | Performed by: DERMATOLOGY

## 2024-07-09 PROCEDURE — 1123F ACP DISCUSS/DSCN MKR DOCD: CPT | Performed by: DERMATOLOGY

## 2024-07-09 PROCEDURE — 1090F PRES/ABSN URINE INCON ASSESS: CPT | Performed by: DERMATOLOGY

## 2024-07-09 RX ORDER — TACROLIMUS 1 MG/G
OINTMENT TOPICAL
Qty: 100 G | Refills: 2 | Status: SHIPPED | OUTPATIENT
Start: 2024-07-09

## 2024-07-09 RX ORDER — INSULIN ASPART 100 [IU]/ML
INJECTION, SOLUTION INTRAVENOUS; SUBCUTANEOUS
COMMUNITY
Start: 2024-06-17

## 2024-07-09 NOTE — PROGRESS NOTES
ROS:  Review of Systems  Skin: Denies any new changing, growing or bleeding lesions or rashes except as described in the HPI   Constitutional: Denies fevers, chills, and malaise.    PHYSICAL EXAM:   BP (!) 154/98   Temp 97.4 °F (36.3 °C)   Ht 1.651 m (5' 5\")   Wt 67.6 kg (149 lb)   BMI 24.79 kg/m²     The patient is generally well appearing, well nourished, alert and conversational. Affect is normal.    Cutaneous Exam:  Physical Exam  Total body skin exam excluding external genitalia: head/face, neck, both arms, chest, back, abdomen, both legs, buttocks, digits and/or nails, was examined. Genital exam was deferred as patient denied having any lesions in this area. Complete visualization of scalp may be limited by hair density, length, and/or style    Facial covering was removed during examination.    Diagnoses/exam findings/medical history pertinent to this visit are listed below:    Assessment:   Diagnosis Orders   1. Vitiligo  TSH    Celiac Disease Panel    Vitamin B12    External Referral To Dermatology      2. Verruca vulgaris             Plan:  1. Vitiligo, widespread, BSA 40% at least  - chronic illness, responding to treatment but not yet at goal   - patient has DM1, will screen for associated autoimmune disease  - TSH; Future  - Celiac Disease Panel; Future  - Vitamin B12; Future  - opzelura sample given to patient today. Counseled to use on focal area -- she will use on right upper arm, as this bothers her the most  Referral to [facility] for nbUVB  - External Referral To Dermatology     2. Verruca vulgaris  Cryotherapy and curettage: After verbal consent was obtained including discussion of the risks (lesion persistence, lesion recurrence and hypo/hyperpigmentation) and benefits (resolution of the lesion), and alternative therapies, 1 total Warts on the thumb were treated with liquid nitrogen in a spray gun for a single 6 second freeze-thaw cycle for each lesion, followed by

## 2024-07-09 NOTE — TELEPHONE ENCOUNTER
----- Message from Marianna Fraser sent at 7/9/2024  9:41 AM EDT -----  Regarding: MRI results  Contact: [phone]  Not sure what next step after having mri. Schedule appt?  Phone conversation?

## 2024-07-10 ENCOUNTER — HOSPITAL ENCOUNTER (OUTPATIENT)
Age: 69
Setting detail: SPECIMEN
Discharge: HOME | End: 2024-07-10

## 2024-07-10 LAB
ALBUMIN SERPL-MCNC: 4.5 G/DL (ref 3.5–5.2)
ALBUMIN/GLOB SERPL: 2 {RATIO} (ref 1–2.5)
ALP SERPL-CCNC: 98 U/L (ref 35–104)
ALT SERPL-CCNC: 24 U/L (ref 10–35)
ANION GAP SERPL CALCULATED.3IONS-SCNC: 12 MMOL/L (ref 9–16)
AST SERPL-CCNC: 25 U/L (ref 10–35)
BILIRUB SERPL-MCNC: 0.3 MG/DL (ref 0–1.2)
BUN SERPL-MCNC: 21 MG/DL (ref 8–23)
CALCIUM SERPL-MCNC: 9.8 MG/DL (ref 8.6–10.4)
CHLORIDE SERPL-SCNC: 108 MMOL/L (ref 98–107)
CHOLEST SERPL-MCNC: 157 MG/DL (ref 0–199)
CHOLESTEROL/HDL RATIO: 3
CO2 SERPL-SCNC: 22 MMOL/L (ref 20–31)
CREAT SERPL-MCNC: 0.9 MG/DL (ref 0.5–0.9)
CREAT UR-MCNC: 108 MG/DL (ref 28–217)
GFR, ESTIMATED: 71 ML/MIN/1.73M2
GLUCOSE SERPL-MCNC: 113 MG/DL (ref 74–99)
HDLC SERPL-MCNC: 54 MG/DL
LDLC SERPL CALC-MCNC: 71 MG/DL (ref 0–100)
MICROALBUMIN UR-MCNC: 53 MG/L (ref 0–20)
MICROALBUMIN/CREAT UR-RTO: 49 MCG/MG CREAT (ref 0–25)
POTASSIUM SERPL-SCNC: 4.5 MMOL/L (ref 3.7–5.3)
PROT SERPL-MCNC: 6.6 G/DL (ref 6.6–8.7)
SODIUM SERPL-SCNC: 142 MMOL/L (ref 136–145)
TRIGL SERPL-MCNC: 160 MG/DL
TSH SERPL DL<=0.05 MIU/L-ACNC: 1.26 UIU/ML (ref 0.27–4.2)
VLDLC SERPL CALC-MCNC: 32 MG/DL

## 2024-07-11 ENCOUNTER — OFFICE VISIT (OUTPATIENT)
Dept: ORTHOPEDIC SURGERY | Age: 69
End: 2024-07-11
Payer: MEDICARE

## 2024-07-11 VITALS — HEIGHT: 65 IN | RESPIRATION RATE: 14 BRPM | WEIGHT: 149.03 LBS | BODY MASS INDEX: 24.83 KG/M2

## 2024-07-11 DIAGNOSIS — M43.10 ACQUIRED SPONDYLOLISTHESIS: ICD-10-CM

## 2024-07-11 DIAGNOSIS — M41.50 DEGENERATIVE SCOLIOSIS IN ADULT PATIENT: ICD-10-CM

## 2024-07-11 DIAGNOSIS — M54.50 LUMBAR SPINE PAIN: Primary | ICD-10-CM

## 2024-07-11 DIAGNOSIS — M48.062 LUMBAR STENOSIS WITH NEUROGENIC CLAUDICATION: ICD-10-CM

## 2024-07-11 DIAGNOSIS — M54.16 LUMBAR RADICULAR PAIN: ICD-10-CM

## 2024-07-11 PROCEDURE — G8420 CALC BMI NORM PARAMETERS: HCPCS | Performed by: ORTHOPAEDIC SURGERY

## 2024-07-11 PROCEDURE — 1036F TOBACCO NON-USER: CPT | Performed by: ORTHOPAEDIC SURGERY

## 2024-07-11 PROCEDURE — 1090F PRES/ABSN URINE INCON ASSESS: CPT | Performed by: ORTHOPAEDIC SURGERY

## 2024-07-11 PROCEDURE — G8399 PT W/DXA RESULTS DOCUMENT: HCPCS | Performed by: ORTHOPAEDIC SURGERY

## 2024-07-11 PROCEDURE — G8427 DOCREV CUR MEDS BY ELIG CLIN: HCPCS | Performed by: ORTHOPAEDIC SURGERY

## 2024-07-11 PROCEDURE — 99213 OFFICE O/P EST LOW 20 MIN: CPT | Performed by: ORTHOPAEDIC SURGERY

## 2024-07-11 PROCEDURE — 1123F ACP DISCUSS/DSCN MKR DOCD: CPT | Performed by: ORTHOPAEDIC SURGERY

## 2024-07-11 PROCEDURE — 3017F COLORECTAL CA SCREEN DOC REV: CPT | Performed by: ORTHOPAEDIC SURGERY

## 2024-07-11 NOTE — PROGRESS NOTES
Patient ID: Marianna Fraser is a 68 y.o. female.    Chief Complaint   Patient presents with    Lower Back Pain     MRI        HPI  Please refer to previous clinic note    History and physical exam largely unchanged although the patient has shown some improvement since she was last seen    Past Medical History:   Diagnosis Date    Acid reflux     Asthma     seasonal    Depression     Diabetic neuropathy (HCC)     Hyperlipidemia     Neuropathy     PONV (postoperative nausea and vomiting)      Past Surgical History:   Procedure Laterality Date    APPENDECTOMY  07/07/2016    laparoscopic    BREAST BIOPSY Left     BREAST REDUCTION SURGERY Bilateral 1989    BREAST SURGERY      b/l reduction    COLONOSCOPY      HYSTERECTOMY (CERVIX STATUS UNKNOWN)      OVARY REMOVAL      SHOULDER ARTHROSCOPY Right 02/22/2022    RIGHT SHOULDER ARTHROSCOPY ROTATOR CUFF REPAIR AND BICEPS TENODESIS WITH ARTHREX AND PRE OP INTERSCALENE BLOCK WITH EXPERAL (Right )    SHOULDER ARTHROSCOPY Right 02/22/2022    RIGHT SHOULDER ARTHROSCOPY ROTATOR CUFF REPAIR AND BICEPS TENODESIS WITH ARTHREX AND PRE OP INTERSCALENE BLOCK WITH EXPERAL performed by Tigre Evans MD at [facility] OR     Family History   Problem Relation Age of Onset    Asthma Mother     High Blood Pressure Mother     Other Mother     Depression Mother     Heart Disease Father     High Cholesterol Father     High Blood Pressure Father     High Cholesterol Brother      Social History     Occupational History    Not on file   Tobacco Use    Smoking status: Never    Smokeless tobacco: Never   Vaping Use    Vaping Use: Never used   Substance and Sexual Activity    Alcohol use: No    Drug use: Never    Sexual activity: Not on file        Review of Systems         Physical Exam  Vitals and nursing note reviewed.   Constitutional:       Appearance: She is well-developed.   HENT:      Head: Normocephalic and atraumatic.      Nose: Nose normal.   Eyes:      Conjunctiva/sclera:

## 2024-07-12 DIAGNOSIS — T14.8XXA MUSCLE STRAIN: ICD-10-CM

## 2024-07-12 RX ORDER — TIZANIDINE 2 MG/1
TABLET ORAL
Qty: 30 TABLET | Refills: 0 | Status: SHIPPED | OUTPATIENT
Start: 2024-07-12

## 2024-07-15 ENCOUNTER — HOSPITAL ENCOUNTER (EMERGENCY)
Age: 69
Discharge: HOME | End: 2024-07-15
Attending: EMERGENCY MEDICINE
Payer: MEDICARE

## 2024-07-15 ENCOUNTER — APPOINTMENT (OUTPATIENT)
Dept: CT IMAGING | Age: 69
End: 2024-07-15
Payer: MEDICARE

## 2024-07-15 VITALS
TEMPERATURE: 97.5 F | DIASTOLIC BLOOD PRESSURE: 89 MMHG | HEIGHT: 65 IN | HEART RATE: 68 BPM | BODY MASS INDEX: 26.49 KG/M2 | WEIGHT: 159 LBS | SYSTOLIC BLOOD PRESSURE: 143 MMHG | RESPIRATION RATE: 14 BRPM | OXYGEN SATURATION: 97 %

## 2024-07-15 DIAGNOSIS — N20.1 URETERIC STONE: Primary | ICD-10-CM

## 2024-07-15 DIAGNOSIS — N23 RENAL COLIC: ICD-10-CM

## 2024-07-15 LAB
ANION GAP SERPL CALCULATED.3IONS-SCNC: 13 MMOL/L (ref 9–17)
BACTERIA URNS QL MICRO: ABNORMAL
BASOPHILS # BLD: 0.1 K/UL (ref 0–0.2)
BASOPHILS NFR BLD: 1 % (ref 0–2)
BILIRUB UR QL STRIP: NEGATIVE
BUN SERPL-MCNC: 20 MG/DL (ref 8–23)
CALCIUM SERPL-MCNC: 10.3 MG/DL (ref 8.6–10.4)
CASTS #/AREA URNS LPF: ABNORMAL /LPF
CASTS #/AREA URNS LPF: ABNORMAL /LPF
CHARACTER UR: ABNORMAL
CHLORIDE SERPL-SCNC: 108 MMOL/L (ref 98–107)
CLARITY UR: ABNORMAL
CO2 SERPL-SCNC: 23 MMOL/L (ref 20–31)
COLOR UR: YELLOW
CREAT SERPL-MCNC: 1.1 MG/DL (ref 0.5–0.9)
CRYSTALS URNS MICRO: ABNORMAL /HPF
EOSINOPHIL # BLD: 0.2 K/UL (ref 0–0.4)
EOSINOPHILS RELATIVE PERCENT: 2 % (ref 1–4)
EPI CELLS #/AREA URNS HPF: ABNORMAL /HPF (ref 0–5)
ERYTHROCYTE [DISTWIDTH] IN BLOOD BY AUTOMATED COUNT: 13.9 % (ref 12.5–15.4)
GFR, ESTIMATED: 55 ML/MIN/1.73M2
GLUCOSE SERPL-MCNC: 121 MG/DL (ref 70–99)
GLUCOSE UR STRIP-MCNC: ABNORMAL MG/DL
HCT VFR BLD AUTO: 38.3 % (ref 36–46)
HGB BLD-MCNC: 12.7 G/DL (ref 12–16)
HGB UR QL STRIP.AUTO: ABNORMAL
KETONES UR STRIP-MCNC: NEGATIVE MG/DL
LEUKOCYTE ESTERASE UR QL STRIP: ABNORMAL
LYMPHOCYTES NFR BLD: 0.8 K/UL (ref 1–4.8)
LYMPHOCYTES RELATIVE PERCENT: 10 % (ref 24–44)
MCH RBC QN AUTO: 30.7 PG (ref 26–34)
MCHC RBC AUTO-ENTMCNC: 33.2 G/DL (ref 31–37)
MCV RBC AUTO: 92.5 FL (ref 80–100)
MONOCYTES NFR BLD: 0.5 K/UL (ref 0.1–1.2)
MONOCYTES NFR BLD: 6 % (ref 2–11)
MUCOUS THREADS URNS QL MICRO: ABNORMAL
NEUTROPHILS NFR BLD: 81 % (ref 36–66)
NEUTS SEG NFR BLD: 6.8 K/UL (ref 1.8–7.7)
NITRITE UR QL STRIP: NEGATIVE
PH UR STRIP: 5.5 [PH] (ref 5–8)
PLATELET # BLD AUTO: 276 K/UL (ref 140–450)
PMV BLD AUTO: 7.9 FL (ref 6–12)
POTASSIUM SERPL-SCNC: 4.3 MMOL/L (ref 3.7–5.3)
PROT UR STRIP-MCNC: ABNORMAL MG/DL
RBC # BLD AUTO: 4.14 M/UL (ref 4–5.2)
RBC #/AREA URNS HPF: ABNORMAL /HPF (ref 0–2)
SODIUM SERPL-SCNC: 144 MMOL/L (ref 135–144)
SP GR UR STRIP: 1.02 (ref 1–1.03)
UROBILINOGEN UR STRIP-ACNC: NORMAL EU/DL (ref 0–1)
WBC #/AREA URNS HPF: ABNORMAL /HPF (ref 0–5)
WBC OTHER # BLD: 8.4 K/UL (ref 3.5–11)

## 2024-07-15 PROCEDURE — 80048 BASIC METABOLIC PNL TOTAL CA: CPT

## 2024-07-15 PROCEDURE — 96376 TX/PRO/DX INJ SAME DRUG ADON: CPT

## 2024-07-15 PROCEDURE — 81001 URINALYSIS AUTO W/SCOPE: CPT

## 2024-07-15 PROCEDURE — 36415 COLL VENOUS BLD VENIPUNCTURE: CPT

## 2024-07-15 PROCEDURE — 96375 TX/PRO/DX INJ NEW DRUG ADDON: CPT

## 2024-07-15 PROCEDURE — 6360000002 HC RX W HCPCS

## 2024-07-15 PROCEDURE — 99284 EMERGENCY DEPT VISIT MOD MDM: CPT

## 2024-07-15 PROCEDURE — 87086 URINE CULTURE/COLONY COUNT: CPT

## 2024-07-15 PROCEDURE — 85025 COMPLETE CBC W/AUTO DIFF WBC: CPT

## 2024-07-15 PROCEDURE — 2580000003 HC RX 258

## 2024-07-15 PROCEDURE — 74176 CT ABD & PELVIS W/O CONTRAST: CPT

## 2024-07-15 PROCEDURE — 96365 THER/PROPH/DIAG IV INF INIT: CPT

## 2024-07-15 RX ORDER — FENTANYL CITRATE 50 UG/ML
50 INJECTION, SOLUTION INTRAMUSCULAR; INTRAVENOUS ONCE
Status: COMPLETED | OUTPATIENT
Start: 2024-07-15 | End: 2024-07-15

## 2024-07-15 RX ORDER — ONDANSETRON 2 MG/ML
4 INJECTION INTRAMUSCULAR; INTRAVENOUS ONCE
Status: COMPLETED | OUTPATIENT
Start: 2024-07-15 | End: 2024-07-15

## 2024-07-15 RX ORDER — TAMSULOSIN HYDROCHLORIDE 0.4 MG/1
0.4 CAPSULE ORAL DAILY
Qty: 5 CAPSULE | Refills: 0 | Status: SHIPPED | OUTPATIENT
Start: 2024-07-15 | End: 2024-07-20

## 2024-07-15 RX ORDER — CEPHALEXIN 500 MG/1
500 CAPSULE ORAL 2 TIMES DAILY
Qty: 6 CAPSULE | Refills: 0 | Status: SHIPPED | OUTPATIENT
Start: 2024-07-15 | End: 2024-07-18

## 2024-07-15 RX ORDER — PROMETHAZINE HYDROCHLORIDE 25 MG/1
25 TABLET ORAL EVERY 8 HOURS PRN
Qty: 15 TABLET | Refills: 0 | Status: SHIPPED | OUTPATIENT
Start: 2024-07-15 | End: 2024-07-20

## 2024-07-15 RX ORDER — OXYCODONE HYDROCHLORIDE 5 MG/1
5 TABLET ORAL EVERY 6 HOURS PRN
Qty: 12 TABLET | Refills: 0 | Status: SHIPPED | OUTPATIENT
Start: 2024-07-15 | End: 2024-07-18

## 2024-07-15 RX ORDER — 0.9 % SODIUM CHLORIDE 0.9 %
1000 INTRAVENOUS SOLUTION INTRAVENOUS ONCE
Status: COMPLETED | OUTPATIENT
Start: 2024-07-15 | End: 2024-07-15

## 2024-07-15 RX ORDER — KETOROLAC TROMETHAMINE 15 MG/ML
15 INJECTION, SOLUTION INTRAMUSCULAR; INTRAVENOUS ONCE
Status: COMPLETED | OUTPATIENT
Start: 2024-07-15 | End: 2024-07-15

## 2024-07-15 RX ADMIN — ONDANSETRON 4 MG: 2 INJECTION INTRAMUSCULAR; INTRAVENOUS at 16:15

## 2024-07-15 RX ADMIN — SODIUM CHLORIDE 1000 ML: 9 INJECTION, SOLUTION INTRAVENOUS at 16:16

## 2024-07-15 RX ADMIN — KETOROLAC TROMETHAMINE 15 MG: 15 INJECTION, SOLUTION INTRAMUSCULAR; INTRAVENOUS at 16:16

## 2024-07-15 RX ADMIN — FENTANYL CITRATE 50 MCG: 50 INJECTION, SOLUTION INTRAMUSCULAR; INTRAVENOUS at 16:16

## 2024-07-15 RX ADMIN — CEFTRIAXONE SODIUM 1000 MG: 1 INJECTION, POWDER, FOR SOLUTION INTRAMUSCULAR; INTRAVENOUS at 17:30

## 2024-07-15 RX ADMIN — ONDANSETRON 4 MG: 2 INJECTION INTRAMUSCULAR; INTRAVENOUS at 17:31

## 2024-07-15 ASSESSMENT — ENCOUNTER SYMPTOMS
COUGH: 0
CONSTIPATION: 0
NAUSEA: 1
ABDOMINAL PAIN: 0
CHEST TIGHTNESS: 0
VOMITING: 1
SHORTNESS OF BREATH: 0
DIARRHEA: 0
BACK PAIN: 1

## 2024-07-15 ASSESSMENT — PAIN - FUNCTIONAL ASSESSMENT: PAIN_FUNCTIONAL_ASSESSMENT: 0-10

## 2024-07-15 ASSESSMENT — PAIN SCALES - GENERAL
PAINLEVEL_OUTOF10: 10
PAINLEVEL_OUTOF10: 6

## 2024-07-15 NOTE — ED TRIAGE NOTES
Patient has right sided flank pain that started yesterday and worsened today. Patient has a kidney stone history and say the pain is similar.

## 2024-07-15 NOTE — DISCHARGE INSTRUCTIONS
Follow up with your urologist or in the Urology Clinic - call for an appointment.    For pain use acetaminophen (Tylenol) or ibuprofen (Motrin / Advil), unless prescribed medications that have acetaminophen or ibuprofen (or similar medications) in it.  You can take over the counter acetaminophen tablets (1 - 2 tablets of the 500-mg strength every 6 hours) or ibuprofen tablets (2 tablets every 4 hours).    Drink plenty of water.  Strain your urine for any stones (collect the stones and take them with you to the urologist.    Take the oxycodone for breakthrough pain only. WARNING:  May cause drowsiness.  May impair ability to operate vehicles or machinery.  Do not use in combination with alcohol.     Take the Phenergan as needed as prescribed for nausea and vomiting.  Do not combine with the oxycodone or take within 4 hours of the oxycodone. WARNING:  May cause drowsiness.  May impair ability to operate vehicles or machinery.  Do not use in combination with alcohol.     Take Flomax once daily for 5 days.    Take the cephalexin twice daily for the next 3 days starting tomorrow as you were given an antibiotic in the ER.        PLEASE RETURN TO THE EMERGENCY DEPARTMENT IMMEDIATELY for worsening symptoms, inability to urinate, worsening of blood in your urine, or if you develop any concerning symptoms such as: high fever not relieved by acetaminophen (Tylenol) and/or ibuprofen (Motrin / Advil), chills, shortness of breath, chest pain, feeling of your heart fluttering or racing, persistent nausea and/or vomiting, vomiting up blood, blood in your stool, numbness, loss of consciousness, weakness or tingling in the arms or legs or change in color of the extremities, changes in mental status, persistent headache, blurry vision, loss of bladder / bowel control, unable to follow up with your physician, or other any other care or concern.

## 2024-07-15 NOTE — ED PROVIDER NOTES
Attending Supervisory Note/Shared Visit   I have personally performed a face to face diagnostic evaluation on this patient. I have reviewed the mid-level’s findings and agree.          (Please note that portions of this note were completed with a voice recognition program.  Efforts were made to edit the dictations but occasionally words are mis-transcribed.)    Taran Florez MD  Attending Emergency Physician        Taran Florez MD  07/15/24 9165

## 2024-07-15 NOTE — ED PROVIDER NOTES
[facility] Emergency Department  [address]  [facility] [address]  Phone: [phone]  Fax: [phone]        Pt Name: Marianna Fraser  MRN: [id number]  Birthdate 1955  Date of evaluation: 7/15/24    CHIEFCOMPLAINT       Chief Complaint   Patient presents with    Flank Pain     Right       HISTORY OF PRESENT ILLNESS (Location/Symptom, Timing/Onset, Context/Setting, Quality, Duration, Modifying Factors, Severity)      Marianna Fraser is a 68 y.o. female with pertinent PMH of nephrolithiasis, lumbar radiculopathy, hyperlipidemia, diabetes, diabetic peripheral neuropathy who presents to the ED via private auto with complaint of right-sided flank pain for the last 2 weeks.  Patient states initially she thought it was an acute exacerbation of her chronic lower back pain, schedule an appointment with her back doctor, Dr. Brown.  Patient was referred to pain management, states she has chronic numbness and tingling to her right leg because of her back.  Patient also reports that she started having worsening pain on the right side over the last couple of weeks, started having nausea and vomiting this morning, now concerned that she has a kidney stone.  Patient reports subjective fever and chills, nausea and vomiting, intermittent radiation of pain to her right groin.  No injury, recent falls or traumas.  Patient did follow-up with urology with her last kidney stone, did not require any procedures or interventions and follows with Dr. Schulz.  Patient did take a Flomax that she had leftover from her kidney stone a couple of years ago and a Norco around 1 PM today.  Denies any blood in her urine.    PAST MEDICAL / SURGICAL / SOCIAL / FAMILY HISTORY     PMH:  has a past medical history of Acid reflux, Asthma, Depression, Diabetes (HCC), Diabetic neuropathy (HCC), Hyperlipidemia, Neuropathy, and PONV (postoperative nausea and vomiting).  Surgical History:  has a past surgical

## 2024-07-16 LAB
MICROORGANISM SPEC CULT: NORMAL
SPECIMEN DESCRIPTION: NORMAL

## 2024-07-17 ENCOUNTER — TELEPHONE (OUTPATIENT)
Dept: UROLOGY | Age: 69
End: 2024-07-17

## 2024-07-17 NOTE — TELEPHONE ENCOUNTER
Writer spoke with pt, she is currently scheduled with Dr. Schulz at the [facility] office and is keeping her appointment there. Referral closed and sent back to sender.

## 2024-07-24 RX ORDER — MIRTAZAPINE 30 MG/1
30 TABLET, FILM COATED ORAL NIGHTLY
Qty: 90 TABLET | Refills: 1 | Status: SHIPPED | OUTPATIENT
Start: 2024-07-24

## 2024-07-25 DIAGNOSIS — T14.8XXA MUSCLE STRAIN: ICD-10-CM

## 2024-07-26 DIAGNOSIS — T14.8XXA MUSCLE STRAIN: ICD-10-CM

## 2024-07-26 RX ORDER — TIZANIDINE 2 MG/1
TABLET ORAL
Qty: 30 TABLET | Refills: 0 | OUTPATIENT
Start: 2024-07-26

## 2024-07-29 RX ORDER — TIZANIDINE 2 MG/1
TABLET ORAL
Qty: 30 TABLET | Refills: 0 | Status: SHIPPED | OUTPATIENT
Start: 2024-07-29

## 2024-07-30 ENCOUNTER — HOSPITAL ENCOUNTER (OUTPATIENT)
Dept: ULTRASOUND IMAGING | Age: 69
Discharge: HOME | End: 2024-08-01
Payer: MEDICARE

## 2024-07-30 DIAGNOSIS — R10.11 RIGHT UPPER QUADRANT ABDOMINAL PAIN: ICD-10-CM

## 2024-07-30 PROCEDURE — 76705 ECHO EXAM OF ABDOMEN: CPT

## 2024-08-06 DIAGNOSIS — K21.9 GASTROESOPHAGEAL REFLUX DISEASE WITHOUT ESOPHAGITIS: ICD-10-CM

## 2024-08-06 RX ORDER — OMEPRAZOLE 20 MG/1
CAPSULE, DELAYED RELEASE ORAL
Qty: 180 CAPSULE | Refills: 1 | Status: SHIPPED | OUTPATIENT
Start: 2024-08-06

## 2024-08-06 RX ORDER — LOSARTAN POTASSIUM 25 MG/1
25 TABLET ORAL DAILY
Qty: 90 TABLET | Refills: 1 | Status: SHIPPED | OUTPATIENT
Start: 2024-08-06

## 2024-08-12 ENCOUNTER — HOSPITAL ENCOUNTER (OUTPATIENT)
Dept: CT IMAGING | Facility: CLINIC | Age: 69
Discharge: HOME | End: 2024-08-14
Attending: UROLOGY
Payer: MEDICARE

## 2024-08-12 DIAGNOSIS — N20.1 CALCULUS OF URETER: ICD-10-CM

## 2024-08-12 PROCEDURE — 74176 CT ABD & PELVIS W/O CONTRAST: CPT

## 2024-08-16 ENCOUNTER — INITIAL CONSULT (OUTPATIENT)
Dept: PAIN MANAGEMENT | Age: 69
End: 2024-08-16
Payer: MEDICARE

## 2024-08-16 VITALS — BODY MASS INDEX: 26.49 KG/M2 | HEART RATE: 68 BPM | WEIGHT: 159 LBS | OXYGEN SATURATION: 97 % | HEIGHT: 65 IN

## 2024-08-16 DIAGNOSIS — M54.17 LUMBOSACRAL NEURITIS: ICD-10-CM

## 2024-08-16 DIAGNOSIS — M47.817 LUMBOSACRAL SPONDYLOSIS WITHOUT MYELOPATHY: Primary | ICD-10-CM

## 2024-08-16 DIAGNOSIS — M48.061 SPINAL STENOSIS OF LUMBAR REGION, UNSPECIFIED WHETHER NEUROGENIC CLAUDICATION PRESENT: ICD-10-CM

## 2024-08-16 PROCEDURE — 1123F ACP DISCUSS/DSCN MKR DOCD: CPT | Performed by: PAIN MEDICINE

## 2024-08-16 PROCEDURE — 3017F COLORECTAL CA SCREEN DOC REV: CPT | Performed by: PAIN MEDICINE

## 2024-08-16 PROCEDURE — G8417 CALC BMI ABV UP PARAM F/U: HCPCS | Performed by: PAIN MEDICINE

## 2024-08-16 PROCEDURE — 1036F TOBACCO NON-USER: CPT | Performed by: PAIN MEDICINE

## 2024-08-16 PROCEDURE — G8427 DOCREV CUR MEDS BY ELIG CLIN: HCPCS | Performed by: PAIN MEDICINE

## 2024-08-16 PROCEDURE — G8399 PT W/DXA RESULTS DOCUMENT: HCPCS | Performed by: PAIN MEDICINE

## 2024-08-16 PROCEDURE — 99204 OFFICE O/P NEW MOD 45 MIN: CPT | Performed by: PAIN MEDICINE

## 2024-08-16 PROCEDURE — 1090F PRES/ABSN URINE INCON ASSESS: CPT | Performed by: PAIN MEDICINE

## 2024-08-16 ASSESSMENT — ENCOUNTER SYMPTOMS
BACK PAIN: 1
BOWEL INCONTINENCE: 0

## 2024-08-16 NOTE — PROGRESS NOTES
and has seen pain management but not sure about the injections. She is seeing a chiropractor and went 1 time and seemed to help.     Patient's complete Health Risk Assessment and screening values have been reviewed and are found in Flowsheets. The following problems were reviewed today and where indicated follow up appointments were made and/or referrals ordered.    Positive Risk Factor Screenings with Interventions:                Inactivity:  On average, how many days per week do you engage in moderate to strenuous exercise (like a brisk walk)?: 2 days (!) Abnormal  On average, how many minutes do you engage in exercise at this level?: 30 min  Interventions:  Patient comments: due to her back pain           Advanced Directives:  Do you have a Living Will?: (!) No  Intervention:  has NO advanced directive - information provided    Advance Care Planning   Discussed the patient’s choices for care and treatment preferences in case of a health event that adversely affects decision-making abilities or is life-limiting. Recommended the patient document care preferences in state-specific advance directives. Also reviewed the process of designating a trusted capable adult as an Agent (or Health Care Power of ) to make health care decisions for the patient if the patient becomes unable due to incapacity. Patient was asked to complete advance directive forms, if not already done, and to provide a dated, signed and witnessed (or notarized) copy, per the forms' requirements, to the practice office.    Time spent (minutes): 16 minutes        CV Risk Counseling:  Patient was asked about her current diet and exercise habits, and personalized advice was provided regarding recommended lifestyle changes. Patient's individual cardiovascular disease risk factors, including advanced age (> 55 for men, > 65 for women), diabetes mellitus, dyslipidemia, and hypertension, were discussed, as well as the likely benefits of lifestyle

## 2024-08-16 NOTE — PROGRESS NOTES
SHOULDER ARTHROSCOPY ROTATOR CUFF REPAIR AND BICEPS TENODESIS WITH ARTHREX AND PRE OP INTERSCALENE BLOCK WITH EXPERAL performed by Tigre Evans MD at [facility] OR       Allergies   Allergen Reactions    Latex     Seasonal     Penicillins Rash         Current Outpatient Medications:     omeprazole (PRILOSEC) 20 MG delayed release capsule, TAKE 1 CAPSULE BY MOUTH 2 TIMES A DAY PRIOR TO MEALS, Disp: 180 capsule, Rfl: 1    losartan (COZAAR) 25 MG tablet, Take 1 tablet by mouth daily, Disp: 90 tablet, Rfl: 1    tiZANidine (ZANAFLEX) 2 MG tablet, TAKE ONE TABLET BY MOUTH ONCE NIGHTLY AS NEEDED FOR MUSCLE SPASM/PAIN, Disp: 30 tablet, Rfl: 0    mirtazapine (REMERON) 30 MG tablet, TAKE ONE TABLET BY MOUTH ONCE NIGHTLY, Disp: 90 tablet, Rfl: 1    tamsulosin (FLOMAX) 0.4 MG capsule, Take 1 capsule by mouth daily for 5 doses, Disp: 5 capsule, Rfl: 0    NOVOLOG 100 UNIT/ML injection vial, , Disp: , Rfl:     tacrolimus (PROTOPIC) 0.1 % ointment, Apply to rash twice daily, Disp: 100 g, Rfl: 2    triamcinolone (KENALOG) 0.1 % cream, Apply to depigmented skin twice daily (not face, armpit or groin), Disp: 454 g, Rfl: 1    meloxicam (MOBIC) 15 MG tablet, Take 1 tablet by mouth daily, Disp: 90 tablet, Rfl: 1    metFORMIN (GLUCOPHAGE) 1000 MG tablet, Take 1 tablet by mouth with breakfast and with evening meal with meals, Disp: 180 tablet, Rfl: 1    escitalopram (LEXAPRO) 5 MG tablet, TAKE ONE TABLET BY MOUTH ONCE NIGHTLY, Disp: 90 tablet, Rfl: 1    acyclovir (ZOVIRAX) 400 MG tablet, Take 1 tablet by mouth 2 times daily, Disp: 180 tablet, Rfl: 1    montelukast (SINGULAIR) 10 MG tablet, Take 1 tablet by mouth nightly, Disp: 90 tablet, Rfl: 3    rosuvastatin (CRESTOR) 10 MG tablet, Take 1 tablet by mouth daily, Disp: 90 tablet, Rfl: 1    gabapentin (NEURONTIN) 600 MG tablet, Take 2 tablets by mouth nightly for 180 days., Disp: 180 tablet, Rfl: 1    triamcinolone (KENALOG) 0.1 % cream, Apply topically 2 times daily., Disp: 453 g,

## 2024-08-16 NOTE — PATIENT INSTRUCTIONS
Personalized Preventive Plan for Marianna Fraser - 8/21/2024  Medicare offers a range of preventive health benefits. Some of the tests and screenings are paid in full while other may be subject to a deductible, co-insurance, and/or copay.    Some of these benefits include a comprehensive review of your medical history including lifestyle, illnesses that may run in your family, and various assessments and screenings as appropriate.    After reviewing your medical record and screening and assessments performed today your provider may have ordered immunizations, labs, imaging, and/or referrals for you.  A list of these orders (if applicable) as well as your Preventive Care list are included within your After Visit Summary for your review.    Other Preventive Recommendations:    A preventive eye exam performed by an eye specialist is recommended every 1-2 years to screen for glaucoma; cataracts, macular degeneration, and other eye disorders.  A preventive dental visit is recommended every 6 months.  Try to get at least 150 minutes of exercise per week or 10,000 steps per day on a pedometer .  Order or download the FREE \"Exercise & Physical Activity: Your Everyday Guide\" from [employer]. Call 1-450.454.1412 or search [employer] online.  You need 9118-5421 mg of calcium and 0213-5144 IU of vitamin D per day. It is possible to meet your calcium requirement with diet alone, but a vitamin D supplement is usually necessary to meet this goal.  When exposed to the sun, use a sunscreen that protects against both UVA and UVB radiation with an SPF of 30 or greater. Reapply every 2 to 3 hours or after sweating, drying off with a towel, or swimming.  Always wear a seat belt when traveling in a car. Always wear a helmet when riding a bicycle or motorcycle.

## 2024-08-21 ENCOUNTER — OFFICE VISIT (OUTPATIENT)
Dept: FAMILY MEDICINE CLINIC | Age: 69
End: 2024-08-21
Payer: MEDICARE

## 2024-08-21 VITALS
HEIGHT: 65 IN | OXYGEN SATURATION: 98 % | HEART RATE: 74 BPM | WEIGHT: 156 LBS | TEMPERATURE: 97 F | RESPIRATION RATE: 16 BRPM | BODY MASS INDEX: 25.99 KG/M2 | DIASTOLIC BLOOD PRESSURE: 84 MMHG | SYSTOLIC BLOOD PRESSURE: 136 MMHG

## 2024-08-21 DIAGNOSIS — I10 HYPERTENSION, UNSPECIFIED TYPE: ICD-10-CM

## 2024-08-21 DIAGNOSIS — E10.41 DIABETIC MONONEUROPATHY ASSOCIATED WITH TYPE 1 DIABETES MELLITUS (HCC): ICD-10-CM

## 2024-08-21 DIAGNOSIS — M48.062 LUMBAR STENOSIS WITH NEUROGENIC CLAUDICATION: ICD-10-CM

## 2024-08-21 DIAGNOSIS — E10.8 TYPE I DIABETES MELLITUS WITH COMPLICATION (HCC): ICD-10-CM

## 2024-08-21 DIAGNOSIS — E55.9 VITAMIN D DEFICIENCY: ICD-10-CM

## 2024-08-21 DIAGNOSIS — E78.00 HYPERCHOLESTEROLEMIA: ICD-10-CM

## 2024-08-21 DIAGNOSIS — Z23 NEED FOR PNEUMOCOCCAL VACCINATION: ICD-10-CM

## 2024-08-21 DIAGNOSIS — Z00.00 MEDICARE ANNUAL WELLNESS VISIT, SUBSEQUENT: Primary | ICD-10-CM

## 2024-08-21 DIAGNOSIS — J45.20 MILD INTERMITTENT ASTHMA WITHOUT COMPLICATION: ICD-10-CM

## 2024-08-21 DIAGNOSIS — K21.9 GASTROESOPHAGEAL REFLUX DISEASE WITHOUT ESOPHAGITIS: ICD-10-CM

## 2024-08-21 PROCEDURE — G0439 PPPS, SUBSEQ VISIT: HCPCS | Performed by: NURSE PRACTITIONER

## 2024-08-21 PROCEDURE — 3044F HG A1C LEVEL LT 7.0%: CPT | Performed by: NURSE PRACTITIONER

## 2024-08-21 PROCEDURE — 1123F ACP DISCUSS/DSCN MKR DOCD: CPT | Performed by: NURSE PRACTITIONER

## 2024-08-21 PROCEDURE — G0009 ADMIN PNEUMOCOCCAL VACCINE: HCPCS | Performed by: NURSE PRACTITIONER

## 2024-08-21 PROCEDURE — 3075F SYST BP GE 130 - 139MM HG: CPT | Performed by: NURSE PRACTITIONER

## 2024-08-21 PROCEDURE — 3079F DIAST BP 80-89 MM HG: CPT | Performed by: NURSE PRACTITIONER

## 2024-08-21 PROCEDURE — 3017F COLORECTAL CA SCREEN DOC REV: CPT | Performed by: NURSE PRACTITIONER

## 2024-08-21 PROCEDURE — 90677 PCV20 VACCINE IM: CPT | Performed by: NURSE PRACTITIONER

## 2024-08-21 ASSESSMENT — PATIENT HEALTH QUESTIONNAIRE - PHQ9
SUM OF ALL RESPONSES TO PHQ QUESTIONS 1-9: 0
SUM OF ALL RESPONSES TO PHQ9 QUESTIONS 1 & 2: 0
1. LITTLE INTEREST OR PLEASURE IN DOING THINGS: NOT AT ALL
2. FEELING DOWN, DEPRESSED OR HOPELESS: NOT AT ALL
SUM OF ALL RESPONSES TO PHQ QUESTIONS 1-9: 0

## 2024-08-21 ASSESSMENT — LIFESTYLE VARIABLES: HOW OFTEN DO YOU HAVE A DRINK CONTAINING ALCOHOL: NEVER

## 2024-09-03 ENCOUNTER — HOSPITAL ENCOUNTER (OUTPATIENT)
Dept: MAMMOGRAPHY | Age: 69
Discharge: HOME | End: 2024-09-05
Payer: MEDICARE

## 2024-09-03 DIAGNOSIS — Z78.0 POSTMENOPAUSAL: ICD-10-CM

## 2024-09-03 PROCEDURE — 77080 DXA BONE DENSITY AXIAL: CPT

## 2024-09-03 RX ORDER — GABAPENTIN 600 MG/1
1200 TABLET ORAL NIGHTLY
Qty: 180 TABLET | Refills: 1 | Status: SHIPPED | OUTPATIENT
Start: 2024-09-03 | End: 2025-03-02

## 2024-09-25 DIAGNOSIS — E78.00 HYPERCHOLESTEROLEMIA: ICD-10-CM

## 2024-09-25 RX ORDER — ROSUVASTATIN CALCIUM 10 MG/1
10 TABLET, COATED ORAL DAILY
Qty: 90 TABLET | Refills: 1 | Status: SHIPPED | OUTPATIENT
Start: 2024-09-25

## 2024-10-02 DIAGNOSIS — M17.11 OSTEOARTHRITIS OF RIGHT KNEE, UNSPECIFIED OSTEOARTHRITIS TYPE: ICD-10-CM

## 2024-10-03 RX ORDER — MELOXICAM 15 MG/1
15 TABLET ORAL DAILY
Qty: 90 TABLET | Refills: 1 | Status: SHIPPED | OUTPATIENT
Start: 2024-10-03

## 2024-10-04 ENCOUNTER — TELEPHONE (OUTPATIENT)
Dept: FAMILY MEDICINE CLINIC | Age: 69
End: 2024-10-04

## 2024-10-04 ENCOUNTER — OFFICE VISIT (OUTPATIENT)
Dept: ORTHOPEDIC SURGERY | Age: 69
End: 2024-10-04

## 2024-10-04 DIAGNOSIS — M48.062 LUMBAR STENOSIS WITH NEUROGENIC CLAUDICATION: Primary | ICD-10-CM

## 2024-10-04 DIAGNOSIS — M25.552 PAIN OF LEFT HIP: Primary | ICD-10-CM

## 2024-10-04 RX ORDER — BETAMETHASONE SODIUM PHOSPHATE AND BETAMETHASONE ACETATE 3; 3 MG/ML; MG/ML
12 INJECTION, SUSPENSION INTRA-ARTICULAR; INTRALESIONAL; INTRAMUSCULAR; SOFT TISSUE ONCE
Status: COMPLETED | OUTPATIENT
Start: 2024-10-04 | End: 2024-10-04

## 2024-10-04 RX ORDER — BUPIVACAINE HYDROCHLORIDE 5 MG/ML
2 INJECTION, SOLUTION PERINEURAL ONCE
Status: COMPLETED | OUTPATIENT
Start: 2024-10-04 | End: 2024-10-04

## 2024-10-04 RX ORDER — LIDOCAINE HYDROCHLORIDE 10 MG/ML
2 INJECTION, SOLUTION INFILTRATION; PERINEURAL ONCE
Status: COMPLETED | OUTPATIENT
Start: 2024-10-04 | End: 2024-10-04

## 2024-10-04 RX ADMIN — LIDOCAINE HYDROCHLORIDE 2 ML: 10 INJECTION, SOLUTION INFILTRATION; PERINEURAL at 09:32

## 2024-10-04 RX ADMIN — BUPIVACAINE HYDROCHLORIDE 10 MG: 5 INJECTION, SOLUTION PERINEURAL at 09:31

## 2024-10-04 RX ADMIN — BETAMETHASONE SODIUM PHOSPHATE AND BETAMETHASONE ACETATE 12 MG: 3; 3 INJECTION, SUSPENSION INTRA-ARTICULAR; INTRALESIONAL; INTRAMUSCULAR; SOFT TISSUE at 09:31

## 2024-10-04 NOTE — TELEPHONE ENCOUNTER
External referral placed let pt know she can check with insurance and see who she would like to go see

## 2024-10-04 NOTE — PROGRESS NOTES
[facility]                   Manuel Baird M.D.            [address], [address]               [place], [place] [address]           Dept Phone: [phone]           Dept Fax:  [phone] [address]                       [address]           [place], [place] [address]          Dept Phone: [phone]           Dept Fax:  [phone]      Chief Compliant:  Chief Complaint   Patient presents with    Pain     Lt hip        History of Present Illness:  This is a 68 y.o. female who presents to the clinic today for evaluation / follow up of left \"hip \"pain.  Patient is a 68-year-old female who I have seen in the past for her right knee with injections.  She is also has been seen by Dr. Evans for her shoulder and Dr. Brown for her back.  She does have some radicular symptoms on her right leg and had an MRI findings consistent with significant degenerative disc disease and facet hypertrophy.  In the meantime the patient is actually here for her left hip and when she points to it she points to the lateral aspect of her greater trochanter.  She does says it goes down her leg a little bit.  Patient does state that she has to wear an insulin pump all day long and she alternates sides and she thinks the pump overlying this area may be irritating it..       Review of Systems   Constitutional: Negative for fever, chills, sweats.   Eyes: Negative for changes in vision, or pain.   HENT: Negative for ear ache, epistaxis, or sore throat.  Respiratory/Cardio: Negative for Chest pain, palpitations, SOB, or cough.  Gastrointestinal: Negative for abdominal pain, N/V/D.   Genitourinary: Negative for dysuria, frequency, urgency, or hematuria.   Neurological: Negative for headache, numbness, or weakness.   Integumentary: Negative for rash, itching, laceration, or abrasion.   Musculoskeletal: Positive for Pain (Lt hip)       Physical Exam:  Constitutional: Patient is oriented to

## 2024-10-04 NOTE — TELEPHONE ENCOUNTER
Pt is requesting a new referral for pain management. She is asking for someone outside [facility]. She is unhappy with them.

## 2024-11-12 ENCOUNTER — TELEPHONE (OUTPATIENT)
Dept: FAMILY MEDICINE CLINIC | Age: 69
End: 2024-11-12

## 2024-11-12 DIAGNOSIS — M48.062 LUMBAR STENOSIS WITH NEUROGENIC CLAUDICATION: Primary | ICD-10-CM

## 2024-11-12 NOTE — TELEPHONE ENCOUNTER
Chiropractor called in stating that she has been working with pt regarding her lower back pain that sometimes radiates to leg. She states that pt has been doing great but needs to work on strengthening. She recommended PT but is unable to put order in since pt has medicare. She is asking if PCP is agreeable to place order for pt.

## 2024-11-26 DIAGNOSIS — B00.1 HERPES LABIALIS: ICD-10-CM

## 2024-11-26 RX ORDER — ACYCLOVIR 400 MG/1
400 TABLET ORAL 2 TIMES DAILY
Qty: 180 TABLET | Refills: 1 | Status: SHIPPED | OUTPATIENT
Start: 2024-11-26

## 2024-12-16 RX ORDER — MIRTAZAPINE 30 MG/1
30 TABLET, FILM COATED ORAL NIGHTLY
Qty: 90 TABLET | Refills: 1 | Status: SHIPPED | OUTPATIENT
Start: 2024-12-16

## 2024-12-23 ENCOUNTER — TELEMEDICINE (OUTPATIENT)
Dept: FAMILY MEDICINE CLINIC | Age: 69
End: 2024-12-23
Payer: MEDICARE

## 2024-12-23 DIAGNOSIS — J01.10 ACUTE NON-RECURRENT FRONTAL SINUSITIS: Primary | ICD-10-CM

## 2024-12-23 PROCEDURE — 1123F ACP DISCUSS/DSCN MKR DOCD: CPT | Performed by: NURSE PRACTITIONER

## 2024-12-23 PROCEDURE — 1159F MED LIST DOCD IN RCRD: CPT | Performed by: NURSE PRACTITIONER

## 2024-12-23 PROCEDURE — G8427 DOCREV CUR MEDS BY ELIG CLIN: HCPCS | Performed by: NURSE PRACTITIONER

## 2024-12-23 PROCEDURE — G8399 PT W/DXA RESULTS DOCUMENT: HCPCS | Performed by: NURSE PRACTITIONER

## 2024-12-23 PROCEDURE — 99213 OFFICE O/P EST LOW 20 MIN: CPT | Performed by: NURSE PRACTITIONER

## 2024-12-23 PROCEDURE — 1090F PRES/ABSN URINE INCON ASSESS: CPT | Performed by: NURSE PRACTITIONER

## 2024-12-23 PROCEDURE — 1160F RVW MEDS BY RX/DR IN RCRD: CPT | Performed by: NURSE PRACTITIONER

## 2024-12-23 PROCEDURE — 3017F COLORECTAL CA SCREEN DOC REV: CPT | Performed by: NURSE PRACTITIONER

## 2024-12-23 RX ORDER — AZITHROMYCIN 250 MG/1
TABLET, FILM COATED ORAL
Qty: 6 TABLET | Refills: 0 | Status: SHIPPED | OUTPATIENT
Start: 2024-12-23 | End: 2025-01-02

## 2024-12-23 RX ORDER — LOSARTAN POTASSIUM 50 MG/1
50 TABLET ORAL DAILY
COMMUNITY
Start: 2024-10-01

## 2024-12-23 ASSESSMENT — ENCOUNTER SYMPTOMS
SORE THROAT: 1
ABDOMINAL PAIN: 0
DIARRHEA: 0
ABDOMINAL DISTENTION: 0
COLOR CHANGE: 0
COUGH: 1
SINUS PAIN: 1
CONSTIPATION: 0
CHEST TIGHTNESS: 0
SINUS PRESSURE: 1
NAUSEA: 0
RHINORRHEA: 1
BACK PAIN: 0
SHORTNESS OF BREATH: 0

## 2024-12-23 NOTE — PROGRESS NOTES
vaccine  Aged Out    Meningococcal (ACWY) vaccine  Aged Out    Pneumococcal 0-64 years Vaccine  Discontinued    Diabetes screen  Discontinued    Hepatitis C screen  Discontinued       PHYSICAL EXAMINATION:  [ INSTRUCTIONS:  \"[x]\" Indicates a positive item  \"[]\" Indicates a negative item  -- DELETE ALL ITEMS NOT EXAMINED]  Vital Signs: Not completed due to virtual visit.     Constitutional: [x] Appears well-developed and well-nourished [x] No apparent distress                            [] Abnormal-   Mental status  [x] Alert and awake  [x] Oriented to person/place/time [x]Able to follow commands       Eyes:  EOM    [x]  Normal  [] Abnormal-  Sclera  [x]  Normal  [] Abnormal -         Discharge [x]  None visible  [] Abnormal -     HENT:   [x] Normocephalic, atraumatic.  [] Abnormal   [x] Mouth/Throat: Mucous membranes are moist.      External Ears [x] Normal  [] Abnormal-      Neck: [x] No visualized mass      Pulmonary/Chest: [x] Respiratory effort normal.  [x] No visualized signs of difficulty breathing or respiratory distress        [] Abnormal-      Neurological:        [x] No Facial Asymmetry (Cranial nerve 7 motor function) (limited exam to video visit)                       [x] No gaze palsy        [] Abnormal-         Skin:                     [x] No significant exanthematous lesions or discoloration noted on facial skin         [] Abnormal-                                  Psychiatric:           [x] Normal Affect [x] No Hallucinations        [] Abnormal-      Other pertinent observable physical exam findings- Patient appears generally well, is speaking full sentences clearly without any observable SOB, no cough, no diaphoresis.   Pt stable during video, does sound congested and c/o sinus pressure to frontal sinuses .  Assessment:      Diagnosis Orders   1. Acute non-recurrent frontal sinusitis  azithromycin (ZITHROMAX) 250 MG tablet        Plan:     Acute non-recurrent frontal sinusitis  - start azithromycin

## 2025-01-14 DIAGNOSIS — K21.9 GASTROESOPHAGEAL REFLUX DISEASE WITHOUT ESOPHAGITIS: ICD-10-CM

## 2025-01-14 DIAGNOSIS — M17.11 OSTEOARTHRITIS OF RIGHT KNEE, UNSPECIFIED OSTEOARTHRITIS TYPE: ICD-10-CM

## 2025-01-14 DIAGNOSIS — E78.00 HYPERCHOLESTEROLEMIA: ICD-10-CM

## 2025-01-14 DIAGNOSIS — B00.1 HERPES LABIALIS: ICD-10-CM

## 2025-01-14 DIAGNOSIS — J45.20 MILD INTERMITTENT ASTHMA WITHOUT COMPLICATION: ICD-10-CM

## 2025-01-14 RX ORDER — ESCITALOPRAM OXALATE 5 MG/1
5 TABLET ORAL NIGHTLY
Qty: 90 TABLET | Refills: 1 | Status: SHIPPED | OUTPATIENT
Start: 2025-01-14

## 2025-01-14 RX ORDER — ROSUVASTATIN CALCIUM 10 MG/1
10 TABLET, COATED ORAL DAILY
Qty: 90 TABLET | Refills: 1 | Status: SHIPPED | OUTPATIENT
Start: 2025-01-14

## 2025-01-14 RX ORDER — MIRTAZAPINE 30 MG/1
30 TABLET, FILM COATED ORAL NIGHTLY
Qty: 90 TABLET | Refills: 1 | Status: SHIPPED | OUTPATIENT
Start: 2025-01-14

## 2025-01-14 RX ORDER — MONTELUKAST SODIUM 10 MG/1
10 TABLET ORAL NIGHTLY
Qty: 90 TABLET | Refills: 3 | Status: SHIPPED | OUTPATIENT
Start: 2025-01-14 | End: 2026-01-09

## 2025-01-14 RX ORDER — ACYCLOVIR 400 MG/1
400 TABLET ORAL 2 TIMES DAILY
Qty: 180 TABLET | Refills: 1 | Status: SHIPPED | OUTPATIENT
Start: 2025-01-14

## 2025-01-14 RX ORDER — MELOXICAM 15 MG/1
15 TABLET ORAL DAILY
Qty: 90 TABLET | Refills: 1 | Status: SHIPPED | OUTPATIENT
Start: 2025-01-14

## 2025-01-14 RX ORDER — GABAPENTIN 600 MG/1
1200 TABLET ORAL NIGHTLY
Qty: 180 TABLET | Refills: 1 | Status: SHIPPED | OUTPATIENT
Start: 2025-01-14 | End: 2025-07-13

## 2025-02-01 NOTE — BRIEF OP NOTE
Brief Postoperative Note      Patient: Jewel Silva  YOB: 1955  MRN: [id number]    Date of Procedure: 2/22/2022    Pre-Op Diagnosis: RIGHT SHOUULDER ROTATOR CUFF TEAR AND BICEPS  TENDONITIS    Post-Op Diagnosis: Right shoulder rotator cuff tear       Procedure(s):  RIGHT SHOULDER ARTHROSCOPY ROTATOR CUFF REPAIR AND BICEPS TENODESIS WITH ARTHREX AND PRE OP INTERSCALENE BLOCK WITH EXPERAL    Surgeon(s):  Joaquim Michelle MD    Assistant:  Resident: Christian Padilla DO    Anesthesia: General    Estimated Blood Loss (mL): Minimal    Complications: None    Specimens:   * No specimens in log *    Implants:  Implant Name Type Inv. Item Serial No.  Lot No. LRB No. Used Action   ANCHOR SUT L16.3MM DIA5. 5MM TI W/ TWO SZ 2 [employer] CRKSCR - [id number]  ANCHOR SUT L16.3MM DIA5. 5MM TI W/ TWO SZ 2 [employer] CRKS[employer]  [employer] [id number] Right 2 Implanted   ANCHOR SUT L24.5MM DIA4. 75MM BIOCOMPOSITE SELF PUNCHING - [id number]  ANCHOR SUT L24.5MM DIA4. 75MM BIOCOMPOSITE SELF PUNCHING  [employer] [id number] Right 2 Implanted         Drains: * No LDAs found *    Findings: See operative report    Electronically signed by Joaquim Michelle MD on 2/22/2022 at 11:11 AM
01-Feb-2025 19:37

## 2025-02-16 DIAGNOSIS — B00.1 HERPES LABIALIS: ICD-10-CM

## 2025-02-16 DIAGNOSIS — J45.20 MILD INTERMITTENT ASTHMA WITHOUT COMPLICATION: ICD-10-CM

## 2025-02-17 RX ORDER — MIRTAZAPINE 30 MG/1
30 TABLET, FILM COATED ORAL NIGHTLY
Qty: 90 TABLET | Refills: 3 | Status: SHIPPED | OUTPATIENT
Start: 2025-02-17

## 2025-02-17 RX ORDER — GABAPENTIN 600 MG/1
1200 TABLET ORAL NIGHTLY
Qty: 180 TABLET | Refills: 0 | Status: SHIPPED | OUTPATIENT
Start: 2025-02-17 | End: 2025-05-18

## 2025-02-17 RX ORDER — MONTELUKAST SODIUM 10 MG/1
10 TABLET ORAL NIGHTLY
Qty: 90 TABLET | Refills: 3 | Status: SHIPPED | OUTPATIENT
Start: 2025-02-17 | End: 2026-02-12

## 2025-02-17 RX ORDER — ACYCLOVIR 400 MG/1
400 TABLET ORAL 2 TIMES DAILY
Qty: 180 TABLET | Refills: 1 | Status: SHIPPED | OUTPATIENT
Start: 2025-02-17

## 2025-02-21 ENCOUNTER — OFFICE VISIT (OUTPATIENT)
Dept: FAMILY MEDICINE CLINIC | Age: 70
End: 2025-02-21

## 2025-02-21 VITALS
RESPIRATION RATE: 16 BRPM | SYSTOLIC BLOOD PRESSURE: 132 MMHG | BODY MASS INDEX: 26.79 KG/M2 | WEIGHT: 161 LBS | TEMPERATURE: 97.8 F | OXYGEN SATURATION: 98 % | DIASTOLIC BLOOD PRESSURE: 80 MMHG | HEART RATE: 77 BPM

## 2025-02-21 DIAGNOSIS — N39.41 URGE INCONTINENCE: ICD-10-CM

## 2025-02-21 DIAGNOSIS — E55.9 VITAMIN D DEFICIENCY: ICD-10-CM

## 2025-02-21 DIAGNOSIS — E78.00 HYPERCHOLESTEROLEMIA: ICD-10-CM

## 2025-02-21 DIAGNOSIS — F51.04 PSYCHOPHYSIOLOGICAL INSOMNIA: ICD-10-CM

## 2025-02-21 DIAGNOSIS — E10.41 DIABETIC MONONEUROPATHY ASSOCIATED WITH TYPE 1 DIABETES MELLITUS (HCC): ICD-10-CM

## 2025-02-21 DIAGNOSIS — K21.9 GASTROESOPHAGEAL REFLUX DISEASE WITHOUT ESOPHAGITIS: Primary | ICD-10-CM

## 2025-02-21 DIAGNOSIS — E53.8 B12 DEFICIENCY: ICD-10-CM

## 2025-02-21 DIAGNOSIS — M48.062 LUMBAR STENOSIS WITH NEUROGENIC CLAUDICATION: ICD-10-CM

## 2025-02-21 DIAGNOSIS — F32.9 REACTIVE DEPRESSION: ICD-10-CM

## 2025-02-21 DIAGNOSIS — I10 HYPERTENSION, UNSPECIFIED TYPE: ICD-10-CM

## 2025-02-21 DIAGNOSIS — J45.20 MILD INTERMITTENT ASTHMA WITHOUT COMPLICATION: ICD-10-CM

## 2025-02-21 DIAGNOSIS — E10.8 TYPE I DIABETES MELLITUS WITH COMPLICATION (HCC): ICD-10-CM

## 2025-02-21 DIAGNOSIS — K63.9 BOWEL TROUBLE: ICD-10-CM

## 2025-02-21 PROBLEM — M51.16 INTERVERTEBRAL DISC DISORDERS WITH RADICULOPATHY, LUMBAR REGION: Status: ACTIVE | Noted: 2025-01-07

## 2025-02-21 PROBLEM — L82.0 INFLAMED SEBORRHEIC KERATOSIS: Status: ACTIVE | Noted: 2025-01-06

## 2025-02-21 PROBLEM — H26.9 NONSENILE CATARACT: Status: ACTIVE | Noted: 2025-01-06

## 2025-02-21 PROBLEM — H52.4 PRESBYOPIA: Status: ACTIVE | Noted: 2025-01-06

## 2025-02-21 RX ORDER — INSULIN LISPRO 100 [IU]/ML
1 INJECTION, SOLUTION INTRAVENOUS; SUBCUTANEOUS
COMMUNITY
Start: 2024-12-27

## 2025-02-21 SDOH — ECONOMIC STABILITY: FOOD INSECURITY: WITHIN THE PAST 12 MONTHS, YOU WORRIED THAT YOUR FOOD WOULD RUN OUT BEFORE YOU GOT MONEY TO BUY MORE.: NEVER TRUE

## 2025-02-21 SDOH — ECONOMIC STABILITY: FOOD INSECURITY: WITHIN THE PAST 12 MONTHS, THE FOOD YOU BOUGHT JUST DIDN'T LAST AND YOU DIDN'T HAVE MONEY TO GET MORE.: NEVER TRUE

## 2025-02-21 ASSESSMENT — ENCOUNTER SYMPTOMS
NAUSEA: 0
RHINORRHEA: 0
ABDOMINAL PAIN: 0
COLOR CHANGE: 0
SHORTNESS OF BREATH: 0
CHEST TIGHTNESS: 0
DIARRHEA: 0
ABDOMINAL DISTENTION: 0
COUGH: 0
SORE THROAT: 0
CONSTIPATION: 0
BACK PAIN: 1

## 2025-02-21 ASSESSMENT — PATIENT HEALTH QUESTIONNAIRE - PHQ9
4. FEELING TIRED OR HAVING LITTLE ENERGY: NOT AT ALL
SUM OF ALL RESPONSES TO PHQ QUESTIONS 1-9: 0
3. TROUBLE FALLING OR STAYING ASLEEP: NOT AT ALL
7. TROUBLE CONCENTRATING ON THINGS, SUCH AS READING THE NEWSPAPER OR WATCHING TELEVISION: NOT AT ALL
10. IF YOU CHECKED OFF ANY PROBLEMS, HOW DIFFICULT HAVE THESE PROBLEMS MADE IT FOR YOU TO DO YOUR WORK, TAKE CARE OF THINGS AT HOME, OR GET ALONG WITH OTHER PEOPLE: NOT DIFFICULT AT ALL
9. THOUGHTS THAT YOU WOULD BE BETTER OFF DEAD, OR OF HURTING YOURSELF: NOT AT ALL
SUM OF ALL RESPONSES TO PHQ9 QUESTIONS 1 & 2: 0
SUM OF ALL RESPONSES TO PHQ QUESTIONS 1-9: 0
6. FEELING BAD ABOUT YOURSELF - OR THAT YOU ARE A FAILURE OR HAVE LET YOURSELF OR YOUR FAMILY DOWN: NOT AT ALL
5. POOR APPETITE OR OVEREATING: NOT AT ALL
8. MOVING OR SPEAKING SO SLOWLY THAT OTHER PEOPLE COULD HAVE NOTICED. OR THE OPPOSITE, BEING SO FIGETY OR RESTLESS THAT YOU HAVE BEEN MOVING AROUND A LOT MORE THAN USUAL: NOT AT ALL
2. FEELING DOWN, DEPRESSED OR HOPELESS: NOT AT ALL
1. LITTLE INTEREST OR PLEASURE IN DOING THINGS: NOT AT ALL

## 2025-02-21 NOTE — PROGRESS NOTES
Emy Boucher, RAMON-CNP  [facility]  [address], [address]  [facility] [address]  Dept: [phone]  Dept Fax: [phone]     Patient ID: Marianna Fraser is a 69 y.o. female Established patient      HPI    Pt here today for f/u on chronic medical problems, go over labs and/or diagnostic studies, and medication refills. Pt denies any fever or chills.  Pt today denies any HA, chest pain, or SOB.  Pt denies any N/V/D/C or abdominal pain.    History of Present Illness  The patient presents for evaluation of urinary incontinence, depression, and bowel issues.    She has been experiencing urinary incontinence, which she attributes to the onset of her back pain. The frequency of incontinence has slightly improved, but she still struggles to reach the bathroom upon waking in the morning because she is slower getting around. She typically wakes up once during the night to use the bathroom and utilizes a panty liner for protection. She reports no associated urinary frequency or urgency.    She has been on Remeron, primarily for sleep, and Lexapro 5 mg for depression, which she started years ago. She began having difficulty sleeping when Beth was diagnosed with autism.    She also reports bowel issues, including stool incontinence, particularly after dinner. She maintains regular bowel movements but notes an increase in frequency, often needing to defecate after each meal. She occasionally experiences nausea but does not have any abdominal pain.    Supplemental Information  She had another epidural on 04/15/2024.    MEDICATIONS  Current: Remeron, Lexapro, gabapentin      Otherwise pt doing well on current tx and no other concerns today.     The patient's past medical, surgical, social, and family history as well as his current medications and allergies were reviewed as documented in today's encounter by SUSIE Marc.      Previous office notes, labs, imaging and hospital

## 2025-02-26 ENCOUNTER — HOSPITAL ENCOUNTER (OUTPATIENT)
Age: 70
Setting detail: THERAPIES SERIES
Discharge: HOME | End: 2025-02-26
Payer: MEDICARE

## 2025-02-26 PROCEDURE — 97161 PT EVAL LOW COMPLEX 20 MIN: CPT

## 2025-02-26 PROCEDURE — 97112 NEUROMUSCULAR REEDUCATION: CPT

## 2025-02-26 NOTE — CONSULTS
[]      Supination [x]  []  []      Leg Length Discrp [x]  []  []      Slumped Sitting []  []  []      Palpation []  []  [x]  No palpation is performed today    Sensation [x]  []  []  No Burning tingling or numbness is reported    Edema [x]  []  []      Neurological [x]  []  []         Functional Test: PFDI/STEVENSON Score: PFDI/STEVENSON                                         Stevenson Fall Risk Assessment    Patient Name:  Marianna Fraser  : 1955    Risk Factor Scale  Score   History of Falls [x] Yes  [] No 25  0 25   Secondary Diagnosis [] Yes  [x] No 15  0 0   Ambulatory Aid [] Furniture  [] Crutches/cane/walker  [x] None/bedrest/wheelchair/nurse 30  15  0 0   IV/Heparin Lock [] Yes  [x] No 20  0 0   Gait/Transferring [] Impaired  [] Weak  [x] Normal/bedrest/immobile 20  10  0 0   Mental Status [] Forgets limitations  [x] Oriented to own ability 15  0 0      Total:25     Based on the Assessment score: check the appropriate box.    []  No intervention needed   Low =   Score of 0-24    [x]  Use standard prevention interventions Moderate =  Score of 24-44   [x] Give patient handout and discuss fall prevention strategies   [x] Establish goal of education for patient/family RE: fall prevention strategies    []  Use high risk prevention interventions High = Score of 45 and higher   [] Give patient handout and discuss fall prevention strategies   [] Establish goal of education for patient/family Re: fall prevention strategies   [] Discuss lifeline / other resources    Electronically signed by:   Anjali Ceja PT  Date: 2025     Comments:  This patient ambulates into department without equipment and without apparent gait anomaly.      Bony landmarks are essentially level and symmetrical.     Lumbar AROM - fingertips to mid shin, extension side bend and rotation decreased 50% from normal.      SLS right 3 seconds and left 4 seconds    This patient is able to perform 10 feet heel walking and 10 feet toe walking,

## 2025-03-07 ENCOUNTER — HOSPITAL ENCOUNTER (OUTPATIENT)
Age: 70
Setting detail: THERAPIES SERIES
Discharge: HOME | End: 2025-03-07

## 2025-03-07 NOTE — FLOWSHEET NOTE
[] [facility]  Outpatient Rehabilitation &  Therapy  [address]  [phone]  [phone] [] [facility]  Outpatient Rehabilitation &  Therapy  [address]  P: [phone]  F: [phone] [] [facility]  Outpatient Rehabilitation &  Therapy  [address]  P: [phone]  F: [phone] [] [facility]  Outpatient Rehabilitation &  Therapy  [address]  [phone]  [phone] [] [facility]  Outpatient Rehabilitation &  Therapy  [address]   P: [phone]  F: [phone]  [x] [facility]  Outpatient Rehabilitation &  Therapy  [address]  P: [phone]  F: [phone] [] [facility]  Outpatient Rehabilitation &  Therapy  [address]  P: [phone]  F: [phone] [] [facility]  Outpatient Rehabilitation &  Therapy  [address]  P: [phone]  F: [phone] [] [facility]  Outpatient Rehabilitation &  Therapy  [address]  P: [phone]  F: [phone]  [] [facility] Outpatient Rehabilitation &  Therapy  [address]  P: [phone]  F: [phone]     Therapy Cancel/No Show note    Date: 3/7/2025  Patient: Marianna Fraser  : 1955  MRN: [id number]    Cancels/No Shows to date:     For today's appointment patient:    [x]  Cancelled    [] Rescheduled appointment    [] No-show     Reason given by patient:    [x]  Patient ill-has influenza-will call when cleared.    []  Conflicting appointment    [] No transportation      [] Conflict with work    [] No reason given    [] Weather related    [] COVID-19    [] Other:      Comments:        [] Next appointment was confirmed    Electronically signed by: Anjali Ceja PT

## 2025-03-18 ENCOUNTER — HOSPITAL ENCOUNTER (OUTPATIENT)
Age: 70
Setting detail: SPECIMEN
Discharge: HOME | End: 2025-03-18

## 2025-03-18 LAB
25(OH)D3 SERPL-MCNC: 47.6 NG/ML (ref 30–100)
ALBUMIN SERPL-MCNC: 4.4 G/DL (ref 3.5–5.2)
ALBUMIN/GLOB SERPL: 1.7 {RATIO} (ref 1–2.5)
ALP SERPL-CCNC: 102 U/L (ref 35–104)
ALT SERPL-CCNC: 26 U/L (ref 10–35)
ANION GAP SERPL CALCULATED.3IONS-SCNC: 10 MMOL/L (ref 9–16)
AST SERPL-CCNC: 31 U/L (ref 10–35)
BILIRUB SERPL-MCNC: 0.6 MG/DL (ref 0–1.2)
BUN SERPL-MCNC: 24 MG/DL (ref 8–23)
CALCIUM SERPL-MCNC: 11.2 MG/DL (ref 8.6–10.4)
CHLORIDE SERPL-SCNC: 106 MMOL/L (ref 98–107)
CHOLEST SERPL-MCNC: 192 MG/DL (ref 0–199)
CHOLESTEROL/HDL RATIO: 3.3
CO2 SERPL-SCNC: 24 MMOL/L (ref 20–31)
CREAT SERPL-MCNC: 1.1 MG/DL (ref 0.6–0.9)
ERYTHROCYTE [DISTWIDTH] IN BLOOD BY AUTOMATED COUNT: 13.4 % (ref 11.8–14.4)
GFR, ESTIMATED: 54 ML/MIN/1.73M2
GLUCOSE SERPL-MCNC: 129 MG/DL (ref 74–99)
HCT VFR BLD AUTO: 41.6 % (ref 36.3–47.1)
HDLC SERPL-MCNC: 59 MG/DL
HGB BLD-MCNC: 12.8 G/DL (ref 11.9–15.1)
LDLC SERPL CALC-MCNC: 96 MG/DL (ref 0–100)
MCH RBC QN AUTO: 29.1 PG (ref 25.2–33.5)
MCHC RBC AUTO-ENTMCNC: 30.8 G/DL (ref 28.4–34.8)
MCV RBC AUTO: 94.5 FL (ref 82.6–102.9)
NRBC BLD-RTO: 0 PER 100 WBC
PLATELET # BLD AUTO: 286 K/UL (ref 138–453)
PMV BLD AUTO: 10.8 FL (ref 8.1–13.5)
POTASSIUM SERPL-SCNC: 5.1 MMOL/L (ref 3.7–5.3)
PROT SERPL-MCNC: 7 G/DL (ref 6.6–8.7)
RBC # BLD AUTO: 4.4 M/UL (ref 3.95–5.11)
SODIUM SERPL-SCNC: 140 MMOL/L (ref 136–145)
TRIGL SERPL-MCNC: 183 MG/DL
VIT B12 SERPL-MCNC: 1382 PG/ML (ref 232–1245)
VLDLC SERPL CALC-MCNC: 37 MG/DL (ref 1–30)
WBC OTHER # BLD: 6.1 K/UL (ref 3.5–11.3)

## 2025-03-19 ENCOUNTER — RESULTS FOLLOW-UP (OUTPATIENT)
Dept: LAB | Age: 70
End: 2025-03-19

## 2025-03-19 DIAGNOSIS — R79.89 ELEVATED VITAMIN B12 LEVEL: Primary | ICD-10-CM

## 2025-03-19 DIAGNOSIS — E83.52 SERUM CALCIUM ELEVATED: ICD-10-CM

## 2025-05-08 RX ORDER — ESCITALOPRAM OXALATE 5 MG/1
5 TABLET ORAL NIGHTLY
Qty: 90 TABLET | Refills: 1 | Status: SHIPPED | OUTPATIENT
Start: 2025-05-08

## 2025-05-08 RX ORDER — ESCITALOPRAM OXALATE 5 MG/1
5 TABLET ORAL NIGHTLY
Qty: 90 TABLET | Refills: 1 | OUTPATIENT
Start: 2025-05-08

## 2025-05-16 ENCOUNTER — OFFICE VISIT (OUTPATIENT)
Dept: ORTHOPEDIC SURGERY | Age: 70
End: 2025-05-16
Payer: MEDICARE

## 2025-05-16 VITALS — WEIGHT: 161 LBS | BODY MASS INDEX: 26.82 KG/M2 | RESPIRATION RATE: 14 BRPM | HEIGHT: 65 IN

## 2025-05-16 DIAGNOSIS — M25.561 RIGHT KNEE PAIN, UNSPECIFIED CHRONICITY: Primary | ICD-10-CM

## 2025-05-16 PROCEDURE — G8417 CALC BMI ABV UP PARAM F/U: HCPCS | Performed by: ORTHOPAEDIC SURGERY

## 2025-05-16 PROCEDURE — 1159F MED LIST DOCD IN RCRD: CPT | Performed by: ORTHOPAEDIC SURGERY

## 2025-05-16 PROCEDURE — 1036F TOBACCO NON-USER: CPT | Performed by: ORTHOPAEDIC SURGERY

## 2025-05-16 PROCEDURE — G8399 PT W/DXA RESULTS DOCUMENT: HCPCS | Performed by: ORTHOPAEDIC SURGERY

## 2025-05-16 PROCEDURE — 1090F PRES/ABSN URINE INCON ASSESS: CPT | Performed by: ORTHOPAEDIC SURGERY

## 2025-05-16 PROCEDURE — 1123F ACP DISCUSS/DSCN MKR DOCD: CPT | Performed by: ORTHOPAEDIC SURGERY

## 2025-05-16 PROCEDURE — 1125F AMNT PAIN NOTED PAIN PRSNT: CPT | Performed by: ORTHOPAEDIC SURGERY

## 2025-05-16 PROCEDURE — 3017F COLORECTAL CA SCREEN DOC REV: CPT | Performed by: ORTHOPAEDIC SURGERY

## 2025-05-16 PROCEDURE — G8427 DOCREV CUR MEDS BY ELIG CLIN: HCPCS | Performed by: ORTHOPAEDIC SURGERY

## 2025-05-16 PROCEDURE — 99213 OFFICE O/P EST LOW 20 MIN: CPT | Performed by: ORTHOPAEDIC SURGERY

## 2025-05-16 RX ORDER — CELECOXIB 200 MG/1
200 CAPSULE ORAL 2 TIMES DAILY
Qty: 60 CAPSULE | Refills: 0 | Status: SHIPPED | OUTPATIENT
Start: 2025-05-16

## 2025-05-16 NOTE — PROGRESS NOTES
(1000 UT) CAPS, Take by mouth, Disp: , Rfl:     GLUCOSAMINE HCL PO, Take by mouth, Disp: , Rfl:   Allergies   Allergen Reactions    Latex     Environmental/Seasonal     Penicillins Rash     Social History     Socioeconomic History    Marital status:      Spouse name: Not on file    Number of children: Not on file    Years of education: Not on file    Highest education level: Not on file   Occupational History    Not on file   Tobacco Use    Smoking status: Never    Smokeless tobacco: Never   Vaping Use    Vaping status: Never Used   Substance and Sexual Activity    Alcohol use: No    Drug use: Never    Sexual activity: Not on file   Other Topics Concern    Not on file   Social History Narrative    Not on file     Social Drivers of Health     Financial Resource Strain: Low Risk  (2/29/2024)    Overall Financial Resource Strain (CARDIA)     Difficulty of Paying Living Expenses: Not hard at all   Food Insecurity: No Food Insecurity (2/21/2025)    Hunger Vital Sign     Worried About Running Out of Food in the Last Year: Never true     Ran Out of Food in the Last Year: Never true   Transportation Needs: No Transportation Needs (2/21/2025)    PRAPARE - Transportation     Lack of Transportation (Medical): No     Lack of Transportation (Non-Medical): No   Physical Activity: Insufficiently Active (8/21/2024)    Exercise Vital Sign     Days of Exercise per Week: 2 days     Minutes of Exercise per Session: 30 min   Stress: Not on file   Social Connections: Not on file   Intimate Partner Violence: Unknown (1/7/2025)    Received from [facility]    Humiliation, Afraid, Rape, and Kick questionnaire     Fear of Current or Ex-Partner: No     Emotionally Abused: Not on file     Physically Abused: Not on file     Sexually Abused: Not on file   Housing Stability: Low Risk  (2/21/2025)    Housing Stability Vital Sign     Unable to Pay for Housing in the Last Year: No     Number of Times Moved in the Last Year: 0

## 2025-06-06 DIAGNOSIS — M25.561 RIGHT KNEE PAIN, UNSPECIFIED CHRONICITY: ICD-10-CM

## 2025-06-06 NOTE — TELEPHONE ENCOUNTER
Ailyn Baird,    Patient requesting refill on Celebrex 200mg 60 capsules no refills  Last refill 5/16/25  LOV 5/16/25  Medication pended

## 2025-06-08 RX ORDER — CELECOXIB 200 MG/1
200 CAPSULE ORAL 2 TIMES DAILY
Qty: 60 CAPSULE | Refills: 0 | Status: SHIPPED | OUTPATIENT
Start: 2025-06-08

## 2025-06-14 DIAGNOSIS — M25.561 RIGHT KNEE PAIN, UNSPECIFIED CHRONICITY: ICD-10-CM

## 2025-06-16 RX ORDER — CELECOXIB 200 MG/1
200 CAPSULE ORAL 2 TIMES DAILY
Qty: 60 CAPSULE | Refills: 0 | Status: SHIPPED | OUTPATIENT
Start: 2025-06-16

## 2025-06-25 ENCOUNTER — TELEMEDICINE (OUTPATIENT)
Dept: FAMILY MEDICINE CLINIC | Age: 70
End: 2025-06-25
Payer: MEDICARE

## 2025-06-25 DIAGNOSIS — K58.0 IRRITABLE BOWEL SYNDROME WITH DIARRHEA: ICD-10-CM

## 2025-06-25 DIAGNOSIS — K21.9 GASTROESOPHAGEAL REFLUX DISEASE WITHOUT ESOPHAGITIS: ICD-10-CM

## 2025-06-25 DIAGNOSIS — M25.50 ARTHRALGIA, UNSPECIFIED JOINT: Primary | ICD-10-CM

## 2025-06-25 PROCEDURE — 99214 OFFICE O/P EST MOD 30 MIN: CPT | Performed by: NURSE PRACTITIONER

## 2025-06-25 PROCEDURE — G8399 PT W/DXA RESULTS DOCUMENT: HCPCS | Performed by: NURSE PRACTITIONER

## 2025-06-25 PROCEDURE — 1160F RVW MEDS BY RX/DR IN RCRD: CPT | Performed by: NURSE PRACTITIONER

## 2025-06-25 PROCEDURE — 1159F MED LIST DOCD IN RCRD: CPT | Performed by: NURSE PRACTITIONER

## 2025-06-25 PROCEDURE — 3017F COLORECTAL CA SCREEN DOC REV: CPT | Performed by: NURSE PRACTITIONER

## 2025-06-25 PROCEDURE — 1123F ACP DISCUSS/DSCN MKR DOCD: CPT | Performed by: NURSE PRACTITIONER

## 2025-06-25 PROCEDURE — G8427 DOCREV CUR MEDS BY ELIG CLIN: HCPCS | Performed by: NURSE PRACTITIONER

## 2025-06-25 PROCEDURE — 1090F PRES/ABSN URINE INCON ASSESS: CPT | Performed by: NURSE PRACTITIONER

## 2025-06-25 RX ORDER — OMEPRAZOLE 20 MG/1
CAPSULE, DELAYED RELEASE ORAL
Qty: 180 CAPSULE | Refills: 1 | Status: SHIPPED | OUTPATIENT
Start: 2025-06-25

## 2025-06-25 ASSESSMENT — ENCOUNTER SYMPTOMS
CONSTIPATION: 0
NAUSEA: 0
BACK PAIN: 0
COUGH: 0
ABDOMINAL PAIN: 0
RHINORRHEA: 0
DIARRHEA: 1
CHEST TIGHTNESS: 0
ABDOMINAL DISTENTION: 0
SORE THROAT: 0
SHORTNESS OF BREATH: 0
COLOR CHANGE: 0

## 2025-06-25 NOTE — PATIENT INSTRUCTIONS
Patient Education   Learning About the Low FODMAP Diet for Irritable Bowel Syndrome (IBS)  What is the low-FODMAP diet?  A low-FODMAP diet is used to find out if certain foods make irritable bowel syndrome (IBS) worse. You stop eating high-FODMAP foods for 2 to 6 weeks. Then you slowly add them back to see how your body reacts.  This is called an elimination diet. A dietitian or doctor can help you follow this diet.  FODMAPs are types of carbohydrates that can be hard for your body to digest. They are in many types of foods. FODMAP stands for:  F ermentable.  O ligosaccharides.  D isaccharides.  M onosaccharides.  A nd p olyols.  If you have IBS, foods that are high in FODMAPs may make your symptoms worse. When you are on this diet, you can still eat carbohydrates that are low in FODMAPs. This includes certain fruits, vegetables, grains, and low-lactose dairy products.  What is it used for?  This diet is used to help manage symptoms of irritable bowel syndrome (IBS). The diet limits foods that are high in FODMAPs.  High-FODMAP foods can be hard to digest. They pull more fluid into your intestines. They are also easily fermented. This can lead to bloating, belly pain, gas, and diarrhea.  The low-FODMAP diet can help you figure out what foods to avoid. And it can help you find foods that are easier to digest.  This diet can help with IBS symptoms. But it's not a cure. You will still need to manage your condition.  How does it work?  At first, you won't eat any high-FODMAP foods for a few weeks.  It can be helpful to work with a dietitian who is trained in the low-FODMAP diet when you try this diet. They can help you find recipes and FODMAP food lists to use while you are on the diet.  After 2 to 6 weeks, you will start to try high-FODMAP foods again. You will add those foods back to your diet, one at a time. Your doctor or dietitian will probably have you wait a few days before you add each new food.  Keep a food

## 2025-06-25 NOTE — PROGRESS NOTES
DONTE Marx  [facility]  [address], [address]  [facility] [address]  Dept: [phone]  Dept Fax: [phone]     Patient ID: Marianna Fraser is a 69 y.o. female Established patient      HPI      History of Present Illness  The patient presents via virtual visit for evaluation of joint pain, diarrhea, and heartburn.    She reports experiencing joint pain, which has been a persistent issue. She has previously consulted with Dr. Muñoz regarding her knee pain, who recommended a knee replacement. However, she has chosen to postpone this procedure until after the summer. Discomfort is also reported in both shoulders, with a suspicion of bilateral rotator cuff tears. Additionally, a swollen finger joint impedes her ability to bend it. Concern is expressed about the potential genetic predisposition to rheumatoid arthritis, given her twin sister's diagnosis.    Episodes of diarrhea occur following the consumption of certain foods, such as pasta, which can last an entire day. These episodes are often followed by periods of constipation. She has not yet tried a low FODMAP diet and expresses concern about the possibility of Crohn's disease.    Frequent heartburn is experienced, even while on medication. She recalls an incident where six pizza bagels for dinner resulted in heartburn. She is currently taking Prilosec and requires a refill.    Weight gain is noted, and she is interested in resuming phentermine treatment during her next visit in August.    FAMILY HISTORY  Her twin sister has rheumatoid arthritis.      The patient's past medical, surgical, social, and family history as well as his current medications and allergies were reviewed as documented in today's encounter by SUSIE Marc.      Previous office notes, labs, imaging and hospital records were reviewed prior to and during encounter.    Current Outpatient Medications on File Prior to Visit

## 2025-06-26 ENCOUNTER — HOSPITAL ENCOUNTER (OUTPATIENT)
Age: 70
Setting detail: SPECIMEN
Discharge: HOME | End: 2025-06-26

## 2025-06-26 DIAGNOSIS — R79.89 ELEVATED VITAMIN B12 LEVEL: ICD-10-CM

## 2025-06-26 DIAGNOSIS — E83.52 SERUM CALCIUM ELEVATED: ICD-10-CM

## 2025-06-26 DIAGNOSIS — M25.50 ARTHRALGIA, UNSPECIFIED JOINT: ICD-10-CM

## 2025-06-26 LAB
ANION GAP SERPL CALCULATED.3IONS-SCNC: 12 MMOL/L (ref 9–16)
BUN SERPL-MCNC: 25 MG/DL (ref 8–23)
CALCIUM SERPL-MCNC: 9.9 MG/DL (ref 8.6–10.4)
CHLORIDE SERPL-SCNC: 109 MMOL/L (ref 98–107)
CO2 SERPL-SCNC: 22 MMOL/L (ref 20–31)
CREAT SERPL-MCNC: 1.1 MG/DL (ref 0.6–0.9)
CRP SERPL HS-MCNC: <3 MG/L (ref 0–5)
ERYTHROCYTE [SEDIMENTATION RATE] IN BLOOD BY PHOTOMETRIC METHOD: 10 MM/HR (ref 0–30)
GFR, ESTIMATED: 54 ML/MIN/1.73M2
GLUCOSE SERPL-MCNC: 140 MG/DL (ref 74–99)
POTASSIUM SERPL-SCNC: 4.8 MMOL/L (ref 3.7–5.3)
RHEUMATOID FACT SER NEPH-ACNC: <10 IU/ML (ref 0–13)
SODIUM SERPL-SCNC: 143 MMOL/L (ref 136–145)
VIT B12 SERPL-MCNC: 679 PG/ML (ref 232–1245)

## 2025-06-27 ENCOUNTER — RESULTS FOLLOW-UP (OUTPATIENT)
Dept: FAMILY MEDICINE CLINIC | Age: 70
End: 2025-06-27

## 2025-06-29 LAB
ANA SER QL IA: NEGATIVE
CCP AB SER IA-ACNC: 0.8 U/ML (ref 0–7)
DSDNA IGG SER QL IA: 2.6 IU/ML
NUCLEAR IGG SER IA-RTO: <0.1 U/ML

## 2025-07-08 DIAGNOSIS — M25.561 RIGHT KNEE PAIN, UNSPECIFIED CHRONICITY: ICD-10-CM

## 2025-07-08 RX ORDER — CELECOXIB 200 MG/1
200 CAPSULE ORAL 2 TIMES DAILY
Qty: 60 CAPSULE | Refills: 0 | Status: SHIPPED | OUTPATIENT
Start: 2025-07-08

## 2025-07-19 DIAGNOSIS — E78.00 HYPERCHOLESTEROLEMIA: ICD-10-CM

## 2025-07-21 RX ORDER — ROSUVASTATIN CALCIUM 10 MG/1
10 TABLET, COATED ORAL DAILY
Qty: 90 TABLET | Refills: 1 | Status: SHIPPED | OUTPATIENT
Start: 2025-07-21

## 2025-08-21 SDOH — HEALTH STABILITY: PHYSICAL HEALTH: ON AVERAGE, HOW MANY DAYS PER WEEK DO YOU ENGAGE IN MODERATE TO STRENUOUS EXERCISE (LIKE A BRISK WALK)?: 0 DAYS

## 2025-08-21 SDOH — HEALTH STABILITY: PHYSICAL HEALTH: ON AVERAGE, HOW MANY MINUTES DO YOU ENGAGE IN EXERCISE AT THIS LEVEL?: 0 MIN

## 2025-08-21 ASSESSMENT — PATIENT HEALTH QUESTIONNAIRE - PHQ9
SUM OF ALL RESPONSES TO PHQ QUESTIONS 1-9: 0
7. TROUBLE CONCENTRATING ON THINGS, SUCH AS READING THE NEWSPAPER OR WATCHING TELEVISION: NOT AT ALL
9. THOUGHTS THAT YOU WOULD BE BETTER OFF DEAD, OR OF HURTING YOURSELF: NOT AT ALL
6. FEELING BAD ABOUT YOURSELF - OR THAT YOU ARE A FAILURE OR HAVE LET YOURSELF OR YOUR FAMILY DOWN: NOT AT ALL
10. IF YOU CHECKED OFF ANY PROBLEMS, HOW DIFFICULT HAVE THESE PROBLEMS MADE IT FOR YOU TO DO YOUR WORK, TAKE CARE OF THINGS AT HOME, OR GET ALONG WITH OTHER PEOPLE: NOT DIFFICULT AT ALL
4. FEELING TIRED OR HAVING LITTLE ENERGY: NOT AT ALL
5. POOR APPETITE OR OVEREATING: NOT AT ALL
SUM OF ALL RESPONSES TO PHQ QUESTIONS 1-9: 0
2. FEELING DOWN, DEPRESSED OR HOPELESS: NOT AT ALL
SUM OF ALL RESPONSES TO PHQ QUESTIONS 1-9: 0
3. TROUBLE FALLING OR STAYING ASLEEP: NOT AT ALL
1. LITTLE INTEREST OR PLEASURE IN DOING THINGS: NOT AT ALL
8. MOVING OR SPEAKING SO SLOWLY THAT OTHER PEOPLE COULD HAVE NOTICED. OR THE OPPOSITE, BEING SO FIGETY OR RESTLESS THAT YOU HAVE BEEN MOVING AROUND A LOT MORE THAN USUAL: NOT AT ALL
SUM OF ALL RESPONSES TO PHQ QUESTIONS 1-9: 0

## 2025-08-21 ASSESSMENT — LIFESTYLE VARIABLES
HOW MANY STANDARD DRINKS CONTAINING ALCOHOL DO YOU HAVE ON A TYPICAL DAY: 1 OR 2
HOW OFTEN DO YOU HAVE A DRINK CONTAINING ALCOHOL: 2
HOW MANY STANDARD DRINKS CONTAINING ALCOHOL DO YOU HAVE ON A TYPICAL DAY: 1
HOW OFTEN DO YOU HAVE SIX OR MORE DRINKS ON ONE OCCASION: 1
HOW OFTEN DO YOU HAVE A DRINK CONTAINING ALCOHOL: MONTHLY OR LESS

## 2025-08-22 ENCOUNTER — HOSPITAL ENCOUNTER (OUTPATIENT)
Dept: GENERAL RADIOLOGY | Age: 70
Discharge: HOME | End: 2025-08-24
Payer: MEDICARE

## 2025-08-22 ENCOUNTER — OFFICE VISIT (OUTPATIENT)
Dept: FAMILY MEDICINE CLINIC | Age: 70
End: 2025-08-22

## 2025-08-22 VITALS
DIASTOLIC BLOOD PRESSURE: 80 MMHG | RESPIRATION RATE: 16 BRPM | WEIGHT: 164 LBS | SYSTOLIC BLOOD PRESSURE: 130 MMHG | OXYGEN SATURATION: 96 % | HEIGHT: 65 IN | HEART RATE: 74 BPM | TEMPERATURE: 98.1 F | BODY MASS INDEX: 27.32 KG/M2

## 2025-08-22 DIAGNOSIS — Z00.00 MEDICARE ANNUAL WELLNESS VISIT, SUBSEQUENT: Primary | ICD-10-CM

## 2025-08-22 DIAGNOSIS — J45.20 MILD INTERMITTENT ASTHMA WITHOUT COMPLICATION: ICD-10-CM

## 2025-08-22 DIAGNOSIS — R19.7 DIARRHEA, UNSPECIFIED TYPE: ICD-10-CM

## 2025-08-22 DIAGNOSIS — R05.3 CHRONIC COUGH: ICD-10-CM

## 2025-08-22 DIAGNOSIS — N39.41 URGE INCONTINENCE: ICD-10-CM

## 2025-08-22 DIAGNOSIS — E55.9 VITAMIN D DEFICIENCY: ICD-10-CM

## 2025-08-22 DIAGNOSIS — E10.8 TYPE I DIABETES MELLITUS WITH COMPLICATION (HCC): ICD-10-CM

## 2025-08-22 DIAGNOSIS — I10 HYPERTENSION, UNSPECIFIED TYPE: ICD-10-CM

## 2025-08-22 DIAGNOSIS — H81.13 BENIGN PAROXYSMAL POSITIONAL VERTIGO DUE TO BILATERAL VESTIBULAR DISORDER: ICD-10-CM

## 2025-08-22 DIAGNOSIS — E78.00 HYPERCHOLESTEROLEMIA: ICD-10-CM

## 2025-08-22 DIAGNOSIS — F32.9 REACTIVE DEPRESSION: ICD-10-CM

## 2025-08-22 DIAGNOSIS — M48.062 LUMBAR STENOSIS WITH NEUROGENIC CLAUDICATION: ICD-10-CM

## 2025-08-22 DIAGNOSIS — F51.04 PSYCHOPHYSIOLOGICAL INSOMNIA: ICD-10-CM

## 2025-08-22 DIAGNOSIS — E10.41 DIABETIC MONONEUROPATHY ASSOCIATED WITH TYPE 1 DIABETES MELLITUS (HCC): ICD-10-CM

## 2025-08-22 DIAGNOSIS — R11.0 NAUSEA: ICD-10-CM

## 2025-08-22 DIAGNOSIS — K21.9 GASTROESOPHAGEAL REFLUX DISEASE WITHOUT ESOPHAGITIS: ICD-10-CM

## 2025-08-22 DIAGNOSIS — R14.0 ABDOMINAL BLOATING: ICD-10-CM

## 2025-08-22 PROCEDURE — 71046 X-RAY EXAM CHEST 2 VIEWS: CPT

## 2025-08-22 RX ORDER — ONDANSETRON 4 MG/1
4 TABLET, FILM COATED ORAL DAILY PRN
Qty: 30 TABLET | Refills: 0 | Status: SHIPPED | OUTPATIENT
Start: 2025-08-22

## 2025-08-22 RX ORDER — PANTOPRAZOLE SODIUM 20 MG/1
20 TABLET, DELAYED RELEASE ORAL 2 TIMES DAILY
Qty: 60 TABLET | Refills: 1 | Status: SHIPPED | OUTPATIENT
Start: 2025-08-22

## 2025-08-22 RX ORDER — PREGABALIN 100 MG/1
100 CAPSULE ORAL 2 TIMES DAILY
COMMUNITY
Start: 2025-08-27 | End: 2025-09-26

## 2025-08-22 ASSESSMENT — ENCOUNTER SYMPTOMS
ABDOMINAL DISTENTION: 1
VOMITING: 1
CONSTIPATION: 0
BACK PAIN: 0
RHINORRHEA: 0
ABDOMINAL PAIN: 0
SHORTNESS OF BREATH: 1
COUGH: 1
CHEST TIGHTNESS: 0
DIARRHEA: 1
COLOR CHANGE: 0
NAUSEA: 0
SORE THROAT: 0

## 2025-08-28 ENCOUNTER — TELEPHONE (OUTPATIENT)
Dept: GASTROENTEROLOGY | Age: 70
End: 2025-08-28

## 2025-08-28 ENCOUNTER — OFFICE VISIT (OUTPATIENT)
Dept: GASTROENTEROLOGY | Age: 70
End: 2025-08-28
Payer: MEDICARE

## 2025-08-28 VITALS
OXYGEN SATURATION: 99 % | TEMPERATURE: 97.4 F | HEART RATE: 82 BPM | DIASTOLIC BLOOD PRESSURE: 78 MMHG | WEIGHT: 167 LBS | BODY MASS INDEX: 27.82 KG/M2 | RESPIRATION RATE: 16 BRPM | SYSTOLIC BLOOD PRESSURE: 117 MMHG | HEIGHT: 65 IN

## 2025-08-28 DIAGNOSIS — K21.9 GASTROESOPHAGEAL REFLUX DISEASE WITHOUT ESOPHAGITIS: ICD-10-CM

## 2025-08-28 DIAGNOSIS — R11.12 PROJECTILE VOMITING WITH NAUSEA: Primary | ICD-10-CM

## 2025-08-28 DIAGNOSIS — Z98.890 H/O COLONOSCOPY WITH POLYPECTOMY: ICD-10-CM

## 2025-08-28 DIAGNOSIS — Z86.0100 H/O COLONOSCOPY WITH POLYPECTOMY: ICD-10-CM

## 2025-08-28 PROCEDURE — G8417 CALC BMI ABV UP PARAM F/U: HCPCS | Performed by: INTERNAL MEDICINE

## 2025-08-28 PROCEDURE — G8427 DOCREV CUR MEDS BY ELIG CLIN: HCPCS | Performed by: INTERNAL MEDICINE

## 2025-08-28 PROCEDURE — 99204 OFFICE O/P NEW MOD 45 MIN: CPT | Performed by: INTERNAL MEDICINE

## 2025-08-28 PROCEDURE — 1090F PRES/ABSN URINE INCON ASSESS: CPT | Performed by: INTERNAL MEDICINE

## 2025-08-28 RX ORDER — POLYETHYLENE GLYCOL 3350 17 G/17G
POWDER, FOR SOLUTION ORAL
Qty: 255 G | Refills: 0 | Status: SHIPPED | OUTPATIENT
Start: 2025-08-28

## 2025-08-30 DIAGNOSIS — M25.561 RIGHT KNEE PAIN, UNSPECIFIED CHRONICITY: ICD-10-CM

## 2025-09-02 RX ORDER — CELECOXIB 200 MG/1
200 CAPSULE ORAL 2 TIMES DAILY
Qty: 60 CAPSULE | Refills: 0 | Status: SHIPPED | OUTPATIENT
Start: 2025-09-02

## (undated) DEVICE — SOLUTION IV IRRIG LACTATED RINGERS 3000ML 2B7487

## (undated) DEVICE — BLADE SHV L13CM DIA4MM BNE CUT AGG DEB COOLCUT

## (undated) DEVICE — DRAPE,U/ SHT,SPLIT,PLAS,STERIL: Brand: MEDLINE

## (undated) DEVICE — GLOVE ORANGE PI 7 1/2   MSG9075

## (undated) DEVICE — KIT CHAIR TRIMANO FOAM W/ SUPP ARM DRP ERGONOMICALLY DESIGNED

## (undated) DEVICE — Device

## (undated) DEVICE — DISC ABSRB YEL FLR POLYETH MGMT SUCT QUICKSUITE

## (undated) DEVICE — POUCH FLD 36X29IN SHLDR HVY LO GLARE MATTE FINISH FLM 2 SUCT

## (undated) DEVICE — SUTURE PROL SZ 3-0 L18IN NONABSORBABLE BLU L30MM FS-1 3/8 8663G

## (undated) DEVICE — BLANKET WRM W40.2XL55.9IN IORT LO BODY + MISTRAL AIR

## (undated) DEVICE — PROTECTOR ULN NRV PUR FOAM HK LOOP STRP ANATOMICALLY

## (undated) DEVICE — SYRINGE MED 50ML LUERLOCK TIP

## (undated) DEVICE — TUBING PMP L16FT MAIN DISP FOR AR-6400 AR-6475

## (undated) DEVICE — STOCKINETTE,IMPERVIOUS,12X48,STERILE: Brand: MEDLINE

## (undated) DEVICE — SHEET, ORTHO, SPLIT, STERILE: Brand: MEDLINE

## (undated) DEVICE — 4.2MM ULTRAFRR: Brand: STERLING ULTRAFRR

## (undated) DEVICE — 1010 S-DRAPE TOWEL DRAPE 10/BX: Brand: STERI-DRAPE™

## (undated) DEVICE — YANKAUER,SMOOTH HANDLE,HIGH CAPACITY: Brand: MEDLINE INDUSTRIES, INC.

## (undated) DEVICE — COVER,MAYO STAND,STERILE: Brand: MEDLINE

## (undated) DEVICE — SOLUTION IRRIG 3000ML LAC RINGERS ARTHROMTC PLAS CONT

## (undated) DEVICE — GLOVE ORANGE PI 7   MSG9070

## (undated) DEVICE — 4-PORT MANIFOLD: Brand: NEPTUNE 2

## (undated) DEVICE — STRAP,POSITIONING,KNEE/BODY,FOAM,4X60": Brand: MEDLINE

## (undated) DEVICE — NEEDLE SUT PASS FOR ROT CUF LABRAL REP MULTFI SCORPION

## (undated) DEVICE — CHLORAPREP 26ML ORANGE

## (undated) DEVICE — PROBE ABLAT XL 90DEG ASPIR BPLR RF 1 PC ELECTRD ERGO HNDL

## (undated) DEVICE — CANNULA ARTHSCP L4CM DIA8MM PASSPRT BTTN

## (undated) DEVICE — MHPB ARTHROSCOPY PACK: Brand: MEDLINE INDUSTRIES, INC.

## (undated) DEVICE — AMBIENT SUPER TURBOVAC 90: Brand: COBLATION

## (undated) DEVICE — DRESSING,GAUZE,XEROFORM,CURAD,1"X8",ST: Brand: CURAD

## (undated) DEVICE — POUCH INSTR W6.75XL11.5IN FRST 2 PKT ADH FOR ORTH AND

## (undated) DEVICE — TUBING, SUCTION, 9/32" X 20', STRAIGHT: Brand: MEDLINE INDUSTRIES, INC.

## (undated) DEVICE — 3M™ STERI-DRAPE™ U-DRAPE 1015: Brand: STERI-DRAPE™

## (undated) DEVICE — DRESSING TRNSPAR W5XL4.5IN FLM SHT SEMIPERMEABLE WIND

## (undated) DEVICE — SUTURE SUTTAPE FIBERLINK 1.3MM WHT BLU CLS LOOP AR7535

## (undated) DEVICE — ELECTRODE PT RET AD L9FT HI MOIST COND ADH HYDRGEL CORDED

## (undated) DEVICE — DRAPE,REIN 53X77,STERILE: Brand: MEDLINE